# Patient Record
Sex: FEMALE | Race: BLACK OR AFRICAN AMERICAN | NOT HISPANIC OR LATINO | URBAN - METROPOLITAN AREA
[De-identification: names, ages, dates, MRNs, and addresses within clinical notes are randomized per-mention and may not be internally consistent; named-entity substitution may affect disease eponyms.]

---

## 2021-10-06 ENCOUNTER — INPATIENT (INPATIENT)
Facility: HOSPITAL | Age: 66
LOS: 5 days | Discharge: HOME | DRG: 287 | End: 2021-10-12
Attending: INTERNAL MEDICINE | Admitting: INTERNAL MEDICINE
Payer: MEDICARE

## 2021-10-06 VITALS
WEIGHT: 149.91 LBS | SYSTOLIC BLOOD PRESSURE: 127 MMHG | OXYGEN SATURATION: 97 % | HEIGHT: 64 IN | TEMPERATURE: 99 F | HEART RATE: 62 BPM | DIASTOLIC BLOOD PRESSURE: 68 MMHG | RESPIRATION RATE: 18 BRPM

## 2021-10-06 DIAGNOSIS — E11.9 TYPE 2 DIABETES MELLITUS WITHOUT COMPLICATIONS: ICD-10-CM

## 2021-10-06 DIAGNOSIS — I10 ESSENTIAL (PRIMARY) HYPERTENSION: ICD-10-CM

## 2021-10-06 DIAGNOSIS — I50.9 HEART FAILURE, UNSPECIFIED: ICD-10-CM

## 2021-10-06 DIAGNOSIS — E78.5 HYPERLIPIDEMIA, UNSPECIFIED: ICD-10-CM

## 2021-10-06 DIAGNOSIS — R07.9 CHEST PAIN, UNSPECIFIED: ICD-10-CM

## 2021-10-06 LAB
ALBUMIN SERPL ELPH-MCNC: 4.1 G/DL — SIGNIFICANT CHANGE UP (ref 3.3–5)
ALP SERPL-CCNC: 112 U/L — SIGNIFICANT CHANGE UP (ref 40–120)
ALT FLD-CCNC: 167 U/L — HIGH (ref 10–45)
ANION GAP SERPL CALC-SCNC: 10 MMOL/L — SIGNIFICANT CHANGE UP (ref 5–17)
APTT BLD: 31.2 SEC — SIGNIFICANT CHANGE UP (ref 27.5–35.5)
AST SERPL-CCNC: 141 U/L — HIGH (ref 10–40)
BASOPHILS # BLD AUTO: 0.03 K/UL — SIGNIFICANT CHANGE UP (ref 0–0.2)
BASOPHILS NFR BLD AUTO: 0.5 % — SIGNIFICANT CHANGE UP (ref 0–2)
BILIRUB SERPL-MCNC: 0.5 MG/DL — SIGNIFICANT CHANGE UP (ref 0.2–1.2)
BUN SERPL-MCNC: 19 MG/DL — SIGNIFICANT CHANGE UP (ref 7–23)
CALCIUM SERPL-MCNC: 10.8 MG/DL — HIGH (ref 8.4–10.5)
CHLORIDE SERPL-SCNC: 105 MMOL/L — SIGNIFICANT CHANGE UP (ref 96–108)
CK MB CFR SERPL CALC: 1.3 NG/ML — SIGNIFICANT CHANGE UP (ref 0–6.7)
CK MB CFR SERPL CALC: 1.5 NG/ML — SIGNIFICANT CHANGE UP (ref 0–6.7)
CK SERPL-CCNC: 106 U/L — SIGNIFICANT CHANGE UP (ref 25–170)
CK SERPL-CCNC: 99 U/L — SIGNIFICANT CHANGE UP (ref 25–170)
CO2 SERPL-SCNC: 26 MMOL/L — SIGNIFICANT CHANGE UP (ref 22–31)
CREAT SERPL-MCNC: 1.11 MG/DL — SIGNIFICANT CHANGE UP (ref 0.5–1.3)
EOSINOPHIL # BLD AUTO: 0.06 K/UL — SIGNIFICANT CHANGE UP (ref 0–0.5)
EOSINOPHIL NFR BLD AUTO: 1.1 % — SIGNIFICANT CHANGE UP (ref 0–6)
GLUCOSE SERPL-MCNC: 95 MG/DL — SIGNIFICANT CHANGE UP (ref 70–99)
HCT VFR BLD CALC: 36.6 % — SIGNIFICANT CHANGE UP (ref 34.5–45)
HGB BLD-MCNC: 11.2 G/DL — LOW (ref 11.5–15.5)
IMM GRANULOCYTES NFR BLD AUTO: 0.4 % — SIGNIFICANT CHANGE UP (ref 0–1.5)
INR BLD: 1.06 — SIGNIFICANT CHANGE UP (ref 0.88–1.16)
LYMPHOCYTES # BLD AUTO: 1.28 K/UL — SIGNIFICANT CHANGE UP (ref 1–3.3)
LYMPHOCYTES # BLD AUTO: 22.7 % — SIGNIFICANT CHANGE UP (ref 13–44)
MCHC RBC-ENTMCNC: 25.8 PG — LOW (ref 27–34)
MCHC RBC-ENTMCNC: 30.6 GM/DL — LOW (ref 32–36)
MCV RBC AUTO: 84.3 FL — SIGNIFICANT CHANGE UP (ref 80–100)
MONOCYTES # BLD AUTO: 0.28 K/UL — SIGNIFICANT CHANGE UP (ref 0–0.9)
MONOCYTES NFR BLD AUTO: 5 % — SIGNIFICANT CHANGE UP (ref 2–14)
NEUTROPHILS # BLD AUTO: 3.98 K/UL — SIGNIFICANT CHANGE UP (ref 1.8–7.4)
NEUTROPHILS NFR BLD AUTO: 70.3 % — SIGNIFICANT CHANGE UP (ref 43–77)
NRBC # BLD: 0 /100 WBCS — SIGNIFICANT CHANGE UP (ref 0–0)
NT-PROBNP SERPL-SCNC: 2156 PG/ML — HIGH (ref 0–300)
PLATELET # BLD AUTO: 255 K/UL — SIGNIFICANT CHANGE UP (ref 150–400)
POTASSIUM SERPL-MCNC: 4.5 MMOL/L — SIGNIFICANT CHANGE UP (ref 3.5–5.3)
POTASSIUM SERPL-SCNC: 4.5 MMOL/L — SIGNIFICANT CHANGE UP (ref 3.5–5.3)
PROT SERPL-MCNC: 6.8 G/DL — SIGNIFICANT CHANGE UP (ref 6–8.3)
PROTHROM AB SERPL-ACNC: 12.7 SEC — SIGNIFICANT CHANGE UP (ref 10.6–13.6)
RBC # BLD: 4.34 M/UL — SIGNIFICANT CHANGE UP (ref 3.8–5.2)
RBC # FLD: 18.1 % — HIGH (ref 10.3–14.5)
SARS-COV-2 RNA SPEC QL NAA+PROBE: NEGATIVE — SIGNIFICANT CHANGE UP
SODIUM SERPL-SCNC: 141 MMOL/L — SIGNIFICANT CHANGE UP (ref 135–145)
TROPONIN T SERPL-MCNC: <0.01 NG/ML — SIGNIFICANT CHANGE UP (ref 0–0.01)
TROPONIN T SERPL-MCNC: <0.01 NG/ML — SIGNIFICANT CHANGE UP (ref 0–0.01)
WBC # BLD: 5.65 K/UL — SIGNIFICANT CHANGE UP (ref 3.8–10.5)
WBC # FLD AUTO: 5.65 K/UL — SIGNIFICANT CHANGE UP (ref 3.8–10.5)

## 2021-10-06 PROCEDURE — 71045 X-RAY EXAM CHEST 1 VIEW: CPT | Mod: 26

## 2021-10-06 PROCEDURE — 99284 EMERGENCY DEPT VISIT MOD MDM: CPT | Mod: CS

## 2021-10-06 PROCEDURE — 93010 ELECTROCARDIOGRAM REPORT: CPT

## 2021-10-06 PROCEDURE — 93306 TTE W/DOPPLER COMPLETE: CPT | Mod: 26

## 2021-10-06 PROCEDURE — 99223 1ST HOSP IP/OBS HIGH 75: CPT

## 2021-10-06 RX ORDER — ESCITALOPRAM OXALATE 10 MG/1
1 TABLET, FILM COATED ORAL
Qty: 0 | Refills: 0 | DISCHARGE

## 2021-10-06 RX ORDER — FUROSEMIDE 40 MG
20 TABLET ORAL
Refills: 0 | Status: DISCONTINUED | OUTPATIENT
Start: 2021-10-06 | End: 2021-10-08

## 2021-10-06 RX ORDER — PANTOPRAZOLE SODIUM 20 MG/1
40 TABLET, DELAYED RELEASE ORAL
Refills: 0 | Status: DISCONTINUED | OUTPATIENT
Start: 2021-10-06 | End: 2021-10-12

## 2021-10-06 RX ORDER — FLUTICASONE PROPIONATE AND SALMETEROL 50; 250 UG/1; UG/1
2 POWDER ORAL; RESPIRATORY (INHALATION)
Qty: 0 | Refills: 0 | DISCHARGE

## 2021-10-06 RX ORDER — ESCITALOPRAM OXALATE 10 MG/1
10 TABLET, FILM COATED ORAL DAILY
Refills: 0 | Status: DISCONTINUED | OUTPATIENT
Start: 2021-10-06 | End: 2021-10-12

## 2021-10-06 RX ORDER — CLOPIDOGREL BISULFATE 75 MG/1
600 TABLET, FILM COATED ORAL ONCE
Refills: 0 | Status: COMPLETED | OUTPATIENT
Start: 2021-10-07 | End: 2021-10-07

## 2021-10-06 RX ORDER — ASPIRIN/CALCIUM CARB/MAGNESIUM 324 MG
81 TABLET ORAL DAILY
Refills: 0 | Status: DISCONTINUED | OUTPATIENT
Start: 2021-10-07 | End: 2021-10-12

## 2021-10-06 RX ORDER — MONTELUKAST 4 MG/1
1 TABLET, CHEWABLE ORAL
Qty: 0 | Refills: 0 | DISCHARGE

## 2021-10-06 RX ORDER — FLUTICASONE PROPIONATE 220 MCG
1 AEROSOL WITH ADAPTER (GRAM) INHALATION
Qty: 0 | Refills: 0 | DISCHARGE

## 2021-10-06 RX ORDER — NIFEDIPINE 30 MG
60 TABLET, EXTENDED RELEASE 24 HR ORAL DAILY
Refills: 0 | Status: DISCONTINUED | OUTPATIENT
Start: 2021-10-06 | End: 2021-10-12

## 2021-10-06 RX ORDER — ATENOLOL 25 MG/1
50 TABLET ORAL DAILY
Refills: 0 | Status: DISCONTINUED | OUTPATIENT
Start: 2021-10-06 | End: 2021-10-08

## 2021-10-06 RX ORDER — ASPIRIN/CALCIUM CARB/MAGNESIUM 324 MG
325 TABLET ORAL DAILY
Refills: 0 | Status: DISCONTINUED | OUTPATIENT
Start: 2021-10-06 | End: 2021-10-06

## 2021-10-06 RX ORDER — ASCORBIC ACID 60 MG
1 TABLET,CHEWABLE ORAL
Qty: 0 | Refills: 0 | DISCHARGE

## 2021-10-06 RX ORDER — MONTELUKAST 4 MG/1
10 TABLET, CHEWABLE ORAL DAILY
Refills: 0 | Status: DISCONTINUED | OUTPATIENT
Start: 2021-10-06 | End: 2021-10-12

## 2021-10-06 RX ORDER — ALBUTEROL 90 UG/1
0 AEROSOL, METERED ORAL
Qty: 0 | Refills: 0 | DISCHARGE

## 2021-10-06 RX ORDER — NITROGLYCERIN 6.5 MG
0.4 CAPSULE, EXTENDED RELEASE ORAL ONCE
Refills: 0 | Status: COMPLETED | OUTPATIENT
Start: 2021-10-06 | End: 2021-10-06

## 2021-10-06 RX ORDER — OXYBUTYNIN CHLORIDE 5 MG
1 TABLET ORAL
Qty: 0 | Refills: 0 | DISCHARGE

## 2021-10-06 RX ORDER — ACETAMINOPHEN 500 MG
650 TABLET ORAL ONCE
Refills: 0 | Status: COMPLETED | OUTPATIENT
Start: 2021-10-06 | End: 2021-10-06

## 2021-10-06 RX ORDER — OXYBUTYNIN CHLORIDE 5 MG
10 TABLET ORAL DAILY
Refills: 0 | Status: DISCONTINUED | OUTPATIENT
Start: 2021-10-06 | End: 2021-10-06

## 2021-10-06 RX ORDER — METFORMIN HYDROCHLORIDE 850 MG/1
1 TABLET ORAL
Qty: 0 | Refills: 0 | DISCHARGE

## 2021-10-06 RX ORDER — ALBUTEROL 90 UG/1
2 AEROSOL, METERED ORAL
Qty: 0 | Refills: 0 | DISCHARGE

## 2021-10-06 RX ORDER — ATENOLOL 25 MG/1
1 TABLET ORAL
Qty: 0 | Refills: 0 | DISCHARGE

## 2021-10-06 RX ORDER — OXYBUTYNIN CHLORIDE 5 MG
10 TABLET ORAL DAILY
Refills: 0 | Status: DISCONTINUED | OUTPATIENT
Start: 2021-10-06 | End: 2021-10-07

## 2021-10-06 RX ORDER — NIFEDIPINE 30 MG
1 TABLET, EXTENDED RELEASE 24 HR ORAL
Qty: 0 | Refills: 0 | DISCHARGE

## 2021-10-06 RX ORDER — CETIRIZINE HYDROCHLORIDE 10 MG/1
1 TABLET ORAL
Qty: 0 | Refills: 0 | DISCHARGE

## 2021-10-06 RX ORDER — PANTOPRAZOLE SODIUM 20 MG/1
1 TABLET, DELAYED RELEASE ORAL
Qty: 0 | Refills: 0 | DISCHARGE

## 2021-10-06 RX ADMIN — Medication 20 MILLIGRAM(S): at 17:27

## 2021-10-06 RX ADMIN — MONTELUKAST 10 MILLIGRAM(S): 4 TABLET, CHEWABLE ORAL at 17:27

## 2021-10-06 RX ADMIN — PANTOPRAZOLE SODIUM 40 MILLIGRAM(S): 20 TABLET, DELAYED RELEASE ORAL at 18:33

## 2021-10-06 RX ADMIN — Medication 650 MILLIGRAM(S): at 21:59

## 2021-10-06 RX ADMIN — Medication 650 MILLIGRAM(S): at 23:07

## 2021-10-06 RX ADMIN — Medication 0.1 MILLIGRAM(S): at 17:28

## 2021-10-06 RX ADMIN — Medication 325 MILLIGRAM(S): at 18:32

## 2021-10-06 RX ADMIN — Medication 0.4 MILLIGRAM(S): at 12:24

## 2021-10-06 NOTE — ED PROVIDER NOTE - CLINICAL SUMMARY MEDICAL DECISION MAKING FREE TEXT BOX
This is a 65 y/o female with HTN, HLD, diabetes, and asthma p/w with one week of worsening 9/10 chest pain radiating to both shoulders associated with worsening GOYAL. EKG upon ED arrival showed diffuse T-wave inversions. Nitroglycerin did not relieve the pain. Tylenol administered for pain relief. CBC wnl, CMP s/f elevated transaminases, possible congestive hepatopathy. Elevated pro-BNP with negative troponin and d-dimer. Repeat EKG was performed due to continued pain, demonstrating This is a 65 y/o female with HTN, HLD, diabetes, and asthma p/w with one week of worsening 9/10 chest pain radiating to both shoulders associated with worsening GOYAL. EKG upon ED arrival showed diffuse T-wave inversions. Nitroglycerin did not relieve the pain. Tylenol administered for pain relief. CBC wnl, CMP s/f elevated transaminases, possible congestive hepatopathy. Elevated pro-BNP with negative troponin and d-dimer. Repeat EKG was performed due to continued pain, unchanged from prior. Admit to cardiology for ACS workup and possible new CHF diagnosis.

## 2021-10-06 NOTE — CHART NOTE - NSCHARTNOTEFT_GEN_A_CORE
CARE PLAN UPDATE:    TTE (10/6/21): normal LV size/fxn, dilated RV, mildly reduced RV systolic fxn, dilated RA, mild TR, pulmHTN (PASP 101).     In light of patient's symptoms, mildly reduced RV systolic fxn on TTE, and TWI in inferior leads on EKG, patient recommended to Left Heart Catheterization with possible intervention. In light of pulmHTN w/ PASP 101 on TTE, patient recommended for a Right Heart Catheterization.     ****OF NOTE: patient has developmental disability and is a permanent resident at [facility], a residence for people with developmental delays located in [place]. Care plan was discussed with Kamille Marvin (), Jeny Nelson (Director of the Clinical Nursing Services), and Veena Mendez (patient's sister). Consent needs to be approved by "Representative Committee" per Ms. Nelson.

## 2021-10-06 NOTE — ED ADULT NURSE NOTE - OBJECTIVE STATEMENT
Presents for increasing SOB on exertion x 1 week with now some chest pain;  presents from facility with PCT.

## 2021-10-06 NOTE — H&P ADULT - NSHPLABSRESULTS_GEN_ALL_CORE
11.2   5.65  )-----------( 255      ( 06 Oct 2021 11:47 )             36.6     141  |  105  |  19  ----------------------------<  95  4.5   |  26  |  1.11    Ca    10.8<H>      06 Oct 2021 11:47    TPro  6.8  /  Alb  4.1  /  TBili  0.5  /  DBili  x   /  AST  141<H>  /  ALT  167<H>  /  AlkPhos  112  10-06      PT/INR - ( 06 Oct 2021 11:47 )   PT: 12.7 sec;   INR: 1.06          PTT - ( 06 Oct 2021 11:47 )  PTT:31.2 sec    CARDIAC MARKERS ( 06 Oct 2021 11:47 )  x     / <0.01 ng/mL / 106 U/L / x     / 1.5 ng/mL            EKG:

## 2021-10-06 NOTE — H&P ADULT - PROBLEM SELECTOR PLAN 4
--Pt on Pravastatin 10mg PO QD at home - not on formulary.   --F/U Lipid profile and consider initiating inpatient Simvastatin or Atorvastatin.

## 2021-10-06 NOTE — H&P ADULT - ATTENDING COMMENTS
Initial attending contact date 10/6/21 on admission. 66F with developmental delay (sister provides consent) with HTn, HLD, DM and asthma who p/w GOYAL and CP.  TTE revealed nml LV function but RV dysfunction and RV dilation with PAP 101mmHg  Plan for:  NPO for R/LHC today 10/7  DAPT load per IC team  BP control with Atenolol 50 daily, Clonindine 0.1 BID, Nifed 60 daily  Lasix 20IV BID to alleviate dyspnea, will redose per RHC results  Trend LFTs daily to assess for resolution of transaminitis  Nutrition consults for lifestyle modification counselling  pHTN service consulted, Dr Barnett will be co- for RHC  PT evaluation requested  Details discussed with the patient and cardiac care team  Sugar Mata M.D.  Cardiology Attending  75minutes spent on total encounter; more than 50% of the visit was spent counseling and/or coordinating care by the attending physician, with plan of care discussed with the patient and cardiac team. Initial attending contact date 10/6/21 on admission. 66F with developmental delay (sister provides consent) with HTn, HLD, DM and asthma who p/w GOYAL and CP.  TTE revealed nml LV function but RV dysfunction and RV dilation with PAP 101mmHg  Plan for:  NPOpMN for R/LHC 10/7  DAPT load per IC team  BP control with Atenolol 50 daily, Clonindine 0.1 BID, Nifed 60 daily  Lasix 20IV BID to alleviate dyspnea, will redose per RHC results  Trend LFTs daily to assess for resolution of transaminitis  Nutrition consults for lifestyle modification counselling  pHTN service consulted, Dr Barnett will be co- for RHC  PT evaluation requested  Details discussed with the patient and cardiac care team  Sugar Mata M.D.  Cardiology Attending  75minutes spent on total encounter; more than 50% of the visit was spent counseling and/or coordinating care by the attending physician, with plan of care discussed with the patient and cardiac team.

## 2021-10-06 NOTE — ED PROVIDER NOTE - OBJECTIVE STATEMENT
This is a 67 y/o female with a history of HLD, HTN, diabetes, and asthma p/w a week of SOB and chest pain. One week ago patient noticed that she has to catch her breath after walking 3-4 blocks or up/down the stairs. This is associated with 8/10 chest pain that radiates to both shoulders. Yesterday her SOB worsened to being unable to walk one block. Last night she had an episode of 9/10 chest pain and this morning she woke up and felt lightheaded. Endorses nausea and one episode of vomiting. Denies fever, chills.

## 2021-10-06 NOTE — ED PROVIDER NOTE - ATTENDING CONTRIBUTION TO CARE
Attending Statement: I have personally performed a face to face diagnostic evaluation on this patient. I have reviewed the MS4 note and agree with the history, exam and plan of care, except as noted.     Attending Contribution to Care:  66F with hx of developmental delay HTN, HLD, diabetes, and asthma p/w with one week of worsening 9/10 chest pain radiating to both shoulders associated with worsening GOYAL. EKG upon ED arrival showed diffuse T-wave inversions, no stemi. labs including CE, dd and cxr performed. Nitro given without releif, will try Tylenol. Labs remarkable for elevated pro-BNP with negative troponin and d-dimer. Repeat EKG was performed due to continued pain, unchanged from prior. Pt Admitted to cardiology for ACS workup and possible new onset CHF.

## 2021-10-06 NOTE — H&P ADULT - PROBLEM SELECTOR PLAN 2
--CCS Class IV anginal symptome w/ GOYAL and chest pain w/ occasional symptoms at rest.    --EKG w/ diffuse TWI in II/III/aVF/V2-V6 (baseline EKG unknown).  --Pending LVEF evaluation with TTE, will proceed with CCTA vs. Cardiac Cath for evaluation of coronary arteries.

## 2021-10-06 NOTE — H&P ADULT - HISTORY OF PRESENT ILLNESS
***INCOMPLETE / IN PROGRESS NOTE***       Pt is 67yo Female who has a developmental delay (lives at [facility] in [place]) and has a PMHx of HTN, HLD, DM T2, and Asthma who presented to [facility] on 10/6/21 endrosing one week of progressively worsening GOYAL w/ ambulation of 1 block. Pt also endorsing associated chest pain that radiates to both shoulders. She decided to present to the ED because overnight she started to have chest pain at rest that was 9/10. Pt also endorses LE edema over the past few months, and an episodes of nausea w/ 1 episode of vomiting. Pt denies abdominal pain, dizziness/syncope, palpitations, orthopnea, fever/chills.      VITALS: HR 58-70, -169/70-99, O2 94%, T 97.7F. Labs: WBC 5.65, Hgb/Hct 11.2/36.6, Plt Cnt 255; Na 141, K 4.5; BUN/Cr 19/1.11; D-Dimer < 150; Trop T < 0.01, CK/CKMB (-); BNP 2156.      Patient admitted to cardiology for further work up of suspected new onset CHF as well as r/o CAD.

## 2021-10-06 NOTE — H&P ADULT - PROBLEM SELECTOR PLAN 1
--Unspecified; pt w/ SOB and LE edema; BNP elevated; Pt denies Hx.    --TTE ordered – f/u results.    --CONT: Lasix 20 IV BID --- pt is Lasix naive; monitor response to low dose IV Lasix.   --Strict I/O’s and daily weights; Core measures.    --PLAN: admtie for diuresis and work up; TTE ordered – f/u; If LVEF reduced, tentative plan for Right and Left Heart Catheterization; If LVEF is not reduced, tentative plan for CCTA for r/o CAD.

## 2021-10-06 NOTE — H&P ADULT - PROBLEM SELECTOR PLAN 3
--CONT: Clonidine 0.1mg PO BID, Atenolol 50mg PO QD, and Nifedipine 60mg PO QD.   --HOLD: HCTZ 25mg PO QD (as pt is receiving Lasix).

## 2021-10-06 NOTE — H&P ADULT - RS GEN PE MLT RESP DETAILS PC
normal/airway patent/breath sounds equal/respirations non-labored/clear to auscultation bilaterally/no rales/no rhonchi/no wheezes

## 2021-10-06 NOTE — H&P ADULT - ASSESSMENT
65yo F w/ developmental delay and PMHx of HTN, HLD, DM T2, and Asthma. Presented w/ GOYAL, CP, and LE edema. BNP elevated. Cardiac enzymes not elevated. EKG w/ TWI II/III/aVF, V2-V6 (no prior EKG for comparison). Pt started on low dose IV Lasix. Pending TTE, will proceed with evaluation for CAD (R/LHC if LVEF reduced; CCTA if LVEF not reduced).

## 2021-10-07 LAB
A1C WITH ESTIMATED AVERAGE GLUCOSE RESULT: 6.3 % — HIGH (ref 4–5.6)
ALBUMIN SERPL ELPH-MCNC: 3.6 G/DL — SIGNIFICANT CHANGE UP (ref 3.3–5)
ALP SERPL-CCNC: 82 U/L — SIGNIFICANT CHANGE UP (ref 40–120)
ALT FLD-CCNC: 105 U/L — HIGH (ref 10–45)
ANION GAP SERPL CALC-SCNC: 11 MMOL/L — SIGNIFICANT CHANGE UP (ref 5–17)
AST SERPL-CCNC: 51 U/L — HIGH (ref 10–40)
BILIRUB DIRECT SERPL-MCNC: 0.2 MG/DL — SIGNIFICANT CHANGE UP (ref 0–0.2)
BILIRUB INDIRECT FLD-MCNC: 0.4 MG/DL — SIGNIFICANT CHANGE UP (ref 0.2–1)
BILIRUB SERPL-MCNC: 0.6 MG/DL — SIGNIFICANT CHANGE UP (ref 0.2–1.2)
BUN SERPL-MCNC: 19 MG/DL — SIGNIFICANT CHANGE UP (ref 7–23)
CALCIUM SERPL-MCNC: 10.4 MG/DL — SIGNIFICANT CHANGE UP (ref 8.4–10.5)
CHLORIDE SERPL-SCNC: 102 MMOL/L — SIGNIFICANT CHANGE UP (ref 96–108)
CHOLEST SERPL-MCNC: 144 MG/DL — SIGNIFICANT CHANGE UP
CO2 SERPL-SCNC: 26 MMOL/L — SIGNIFICANT CHANGE UP (ref 22–31)
COVID-19 SPIKE DOMAIN AB INTERP: POSITIVE
COVID-19 SPIKE DOMAIN ANTIBODY RESULT: >250 U/ML — HIGH
CREAT SERPL-MCNC: 1.07 MG/DL — SIGNIFICANT CHANGE UP (ref 0.5–1.3)
ESTIMATED AVERAGE GLUCOSE: 134 MG/DL — HIGH (ref 68–114)
GLUCOSE SERPL-MCNC: 122 MG/DL — HIGH (ref 70–99)
HCT VFR BLD CALC: 34.9 % — SIGNIFICANT CHANGE UP (ref 34.5–45)
HCV AB S/CO SERPL IA: 0.04 S/CO — SIGNIFICANT CHANGE UP
HCV AB SERPL-IMP: SIGNIFICANT CHANGE UP
HDLC SERPL-MCNC: 55 MG/DL — SIGNIFICANT CHANGE UP
HGB BLD-MCNC: 10.9 G/DL — LOW (ref 11.5–15.5)
LIPID PNL WITH DIRECT LDL SERPL: 61 MG/DL — SIGNIFICANT CHANGE UP
MAGNESIUM SERPL-MCNC: 1.4 MG/DL — LOW (ref 1.6–2.6)
MCHC RBC-ENTMCNC: 25.9 PG — LOW (ref 27–34)
MCHC RBC-ENTMCNC: 31.2 GM/DL — LOW (ref 32–36)
MCV RBC AUTO: 82.9 FL — SIGNIFICANT CHANGE UP (ref 80–100)
NON HDL CHOLESTEROL: 89 MG/DL — SIGNIFICANT CHANGE UP
NRBC # BLD: 0 /100 WBCS — SIGNIFICANT CHANGE UP (ref 0–0)
PLATELET # BLD AUTO: 239 K/UL — SIGNIFICANT CHANGE UP (ref 150–400)
POTASSIUM SERPL-MCNC: 3.8 MMOL/L — SIGNIFICANT CHANGE UP (ref 3.5–5.3)
POTASSIUM SERPL-SCNC: 3.8 MMOL/L — SIGNIFICANT CHANGE UP (ref 3.5–5.3)
PROT SERPL-MCNC: 6.2 G/DL — SIGNIFICANT CHANGE UP (ref 6–8.3)
RBC # BLD: 4.21 M/UL — SIGNIFICANT CHANGE UP (ref 3.8–5.2)
RBC # FLD: 17.5 % — HIGH (ref 10.3–14.5)
SARS-COV-2 IGG+IGM SERPL QL IA: >250 U/ML — HIGH
SARS-COV-2 IGG+IGM SERPL QL IA: POSITIVE
SODIUM SERPL-SCNC: 139 MMOL/L — SIGNIFICANT CHANGE UP (ref 135–145)
TRIGL SERPL-MCNC: 139 MG/DL — SIGNIFICANT CHANGE UP
WBC # BLD: 4.61 K/UL — SIGNIFICANT CHANGE UP (ref 3.8–10.5)
WBC # FLD AUTO: 4.61 K/UL — SIGNIFICANT CHANGE UP (ref 3.8–10.5)

## 2021-10-07 PROCEDURE — 99221 1ST HOSP IP/OBS SF/LOW 40: CPT | Mod: GC

## 2021-10-07 PROCEDURE — 99233 SBSQ HOSP IP/OBS HIGH 50: CPT

## 2021-10-07 PROCEDURE — 99152 MOD SED SAME PHYS/QHP 5/>YRS: CPT | Mod: GC

## 2021-10-07 PROCEDURE — 99152 MOD SED SAME PHYS/QHP 5/>YRS: CPT

## 2021-10-07 PROCEDURE — 93460 R&L HRT ART/VENTRICLE ANGIO: CPT | Mod: 26

## 2021-10-07 PROCEDURE — 93463 DRUG ADMIN & HEMODYNMIC MEAS: CPT

## 2021-10-07 PROCEDURE — 93451 RIGHT HEART CATH: CPT | Mod: 26,GC

## 2021-10-07 RX ORDER — POTASSIUM CHLORIDE 20 MEQ
20 PACKET (EA) ORAL ONCE
Refills: 0 | Status: COMPLETED | OUTPATIENT
Start: 2021-10-07 | End: 2021-10-07

## 2021-10-07 RX ORDER — MAGNESIUM SULFATE 500 MG/ML
2 VIAL (ML) INJECTION ONCE
Refills: 0 | Status: COMPLETED | OUTPATIENT
Start: 2021-10-07 | End: 2021-10-07

## 2021-10-07 RX ORDER — OXYBUTYNIN CHLORIDE 5 MG
5 TABLET ORAL
Refills: 0 | Status: DISCONTINUED | OUTPATIENT
Start: 2021-10-07 | End: 2021-10-12

## 2021-10-07 RX ORDER — ACETAMINOPHEN 500 MG
650 TABLET ORAL ONCE
Refills: 0 | Status: COMPLETED | OUTPATIENT
Start: 2021-10-07 | End: 2021-10-07

## 2021-10-07 RX ADMIN — Medication 81 MILLIGRAM(S): at 08:56

## 2021-10-07 RX ADMIN — Medication 20 MILLIGRAM(S): at 06:17

## 2021-10-07 RX ADMIN — Medication 650 MILLIGRAM(S): at 07:53

## 2021-10-07 RX ADMIN — ATENOLOL 50 MILLIGRAM(S): 25 TABLET ORAL at 06:17

## 2021-10-07 RX ADMIN — CLOPIDOGREL BISULFATE 600 MILLIGRAM(S): 75 TABLET, FILM COATED ORAL at 06:17

## 2021-10-07 RX ADMIN — Medication 50 GRAM(S): at 08:56

## 2021-10-07 RX ADMIN — MONTELUKAST 10 MILLIGRAM(S): 4 TABLET, CHEWABLE ORAL at 22:29

## 2021-10-07 RX ADMIN — Medication 20 MILLIEQUIVALENT(S): at 08:56

## 2021-10-07 RX ADMIN — Medication 60 MILLIGRAM(S): at 07:13

## 2021-10-07 RX ADMIN — ESCITALOPRAM OXALATE 10 MILLIGRAM(S): 10 TABLET, FILM COATED ORAL at 22:30

## 2021-10-07 RX ADMIN — Medication 0.1 MILLIGRAM(S): at 06:17

## 2021-10-07 RX ADMIN — Medication 0.1 MILLIGRAM(S): at 22:29

## 2021-10-07 RX ADMIN — Medication 5 MILLIGRAM(S): at 22:29

## 2021-10-07 RX ADMIN — Medication 20 MILLIGRAM(S): at 22:29

## 2021-10-07 RX ADMIN — Medication 650 MILLIGRAM(S): at 06:16

## 2021-10-07 RX ADMIN — PANTOPRAZOLE SODIUM 40 MILLIGRAM(S): 20 TABLET, DELAYED RELEASE ORAL at 06:17

## 2021-10-07 NOTE — PROGRESS NOTE ADULT - PROBLEM SELECTOR PLAN 5
--F/U A1c.   --Not on insulin at home; holding home Metformin 500mg PO BID while inpatient.       DVT ppx: Hep SQ  GI ppx: Pantoprazole 40mg PO QD.   Dispo: CAD work up - plan pending TTE results.  SW: consulted; pt lives at [facility] in [place]; will need discharge back to facility when ready Hgb a1c 6.3  - Not on insulin at home; holding home Metformin 500mg PO BID while inpatient.     DVT ppx: holding w/ plan for Cath  GI ppx: Pantoprazole 40mg PO QD.   Dispo: pending Cath results.   SW: consulted; pt lives at [facility] in [place]; will need discharge back to facility when ready

## 2021-10-07 NOTE — PROGRESS NOTE ADULT - PROBLEM SELECTOR PLAN 4
--Pt on Pravastatin 10mg PO QD at home - not on formulary.   --F/U Lipid profile and consider initiating inpatient Simvastatin or Atorvastatin. - Pt on Pravastatin 10mg PO QD at home - not on formulary.   - LDL 61  - given elevated LFTs holing Statin for now, f/u LFTs in AM and consider resuming statin

## 2021-10-07 NOTE — PROGRESS NOTE ADULT - PROBLEM SELECTOR PLAN 1
--Unspecified; pt w/ SOB and LE edema; BNP elevated; Pt denies Hx.    --TTE ordered – f/u results.    --CONT: Lasix 20 IV BID --- pt is Lasix naive; monitor response to low dose IV Lasix.   --Strict I/O’s and daily weights; Core measures.    --PLAN: admtie for diuresis and work up; TTE ordered – f/u; If LVEF reduced, tentative plan for Right and Left Heart Catheterization; If LVEF is not reduced, tentative plan for CCTA for r/o CAD. SOB improved, 1+ edema. BNP 2156  - Echo 10/5: normal LV size/fxn, dilated RV, mildly reduced RV systolic fxn, dilated RA, mild TR, pulmHTN (PASP 101). (of note echo still states performed and no official results in sunrise)  - NPO for R/LHC w/ thermodilution.  Consent obtained by sister and in chart, Loaded with Plavix 600mg PO x1 this AM, Continue ASA 81mg QD.  no fluids given diuresis.   - CONT: Lasix 20 IV BID --- pt is Lasix naive; monitor response to low dose IV Lasix.   - elevated LFTs likely 2/2 hepatic congestion, trending down, f/u repeat BMP tomorrow.   - Strict I/O’s and daily weights; Core measures.

## 2021-10-07 NOTE — PROGRESS NOTE ADULT - ASSESSMENT
66 F with developmental delay, HTN, HLD, DM2, asthma, and ?hx of MI who presents with progressive worsening GOYAL associated with chest discomfort x1-2 weeks, found to have elevated PASP on ECHO. Admitted to cardiac telemetry. Pulmonary consulted for pulmonary hypertension workup.    #Pulmonary hypertension  Review of ECHO (official read still pending) with evidence of pulmonary hypertension. Patient pending RHC today to     Suspected based on unofficial TTE with estimated PASP 101 torr (see chart note); formal TTE pending. No reported hx of PH or cardiomyopathies. No known rheumatologic, connective tissue, or structural lung/heart disease history that would clue into group I, III or V etiologies of PH though further workup will likely be needed and no prior CT chest imaging available. RG, if present, would be highly unlikely to cause such severe PH on its own. CTEPH may need to be ruled out eventually, pending initial work-up.     Her current presentation seems like new-onset heart failure with anterior B-line predominant pattern (though not confluent in upper fields) and trivial bilateral effusions contextualized to given hx of progressive GOYAL, orthopnea, LE edema and mildly elevated serum proBNP. Symptoms have improved after initial dose of lasix in the ED.    Recommendations:  - agree w/ trial of diuresis  - potential L+RHC tomorrow per cardiology, rule out ischemic heart dz  - f/u formal TTE  - if right heart cath is performed and patient is in heart failure, may obfuscate underlying source(s) of PH  - bronchodilators as needed; does not appear to be in acute exacerbation of asthma  - additional recommendations likely to follow after initial work-up     To be discussed with attending in AM 66 F with developmental delay, HTN, HLD, DM2, asthma, and ?hx of MI who presents with progressive worsening GOYAL associated with chest discomfort x1-2 weeks, found to have elevated PASP on ECHO. Admitted to cardiac telemetry. Pulmonary consulted for pulmonary hypertension workup.    #Pulmonary hypertension  Review of ECHO (official read still pending) with evidence of pulmonary hypertension. Patient pending RHC today to further evaluate right-sided pressures and confirm PH. Patient with likely multiple etiologies of PH as she has multiple risk factors.   -Pending formal read of ECHO to determine whether pt has heart failure, p/w progressive GOYAL and chest discomfort suggestive of angina. Family reports hx of MI ~30 years ago, pending LHC to evaluate for coronary artery disease.  -Pt likely with component of OHS/RG. Pt obese, BMI ~38 (standing weight 100kg). Pt denies hx of sleep apnea however sister reports that she snores. STOP-BANG >4, indicating high risk for RG.  -Pt obese and mostly sedentary, with risk factors for hypercoagulable state and CTEPH  -Pt without any personal or family history of rheumatologic or connective tissue disorders. Social history negative for alcohol use, tobacco use, cocaine or other illicit drugs, diet pills.   Recommendations:    -f/u official ECHO report    -f/u LHC to rule out coronary artery disease    -will perform RHC to evaluate for pulmonary hypertension. If present, pt will need further workup including 6-minute walk test, PFTs, CT chest without contrast, V/Q scan, sleep study, abdominal ultrasound.    -would recommend obtaining Utox and HIV to evaluate for other causes of PH.    Further recommendations pending results of RHC.  Will continue to follow.

## 2021-10-07 NOTE — PROGRESS NOTE ADULT - ATTENDING COMMENTS
66F with developmental delay (sister provides consent) with HTn, HLD, DM and asthma who p/w GOYAL and CP.  TTE revealed nml LV function but RV dysfunction and RV dilation with PAP 101mmHg.   Currently asx, HDS, exam benign, clear lungs no MARQUEZ  Plan for:  NPO for R/LHC today 10/7  DAPT load per IC team  BP control with Atenolol 50 daily, Clonidine 0.1 BID, Nifed 60 daily  Trend LFTs daily to assess for resolution of transaminitis  pHTN service consulted, Dr Barnett will be co- for RHC  PT evaluation remains pending  Details discussed with the patient, sister at bedside and cardiac care team  Sugar Mata M.D.  Cardiology Attending  35minutes spent on total encounter; more than 50% of the visit was spent counseling and/or coordinating care by the attending physician, with plan of care discussed with the patient and cardiac team.

## 2021-10-07 NOTE — PROGRESS NOTE ADULT - ASSESSMENT
65yo F w/ developmental delay and PMHx of HTN, HLD, DM T2, and Asthma. Presented w/ GOYAL, CP, and LE edema. BNP elevated. Cardiac enzymes not elevated. EKG w/ TWI II/III/aVF, V2-V6 (no prior EKG for comparison). Pt started on low dose IV Lasix. Pending TTE, will proceed with evaluation for CAD (R/LHC if LVEF reduced; CCTA if LVEF not reduced).  67yo F w/ developmental delay and PMHx of HTN, HLD, DM T2, and Asthma. Presented w/ GOYAL, CP, and LE edema. BNP elevated. Cardiac enzymes not elevated. EKG w/ TWI II/III/aVF, V2-V6 (no prior EKG for comparison). Pt started on low dose IV Lasix.  s/p Echo revealing normal EF, PASP 101.  Given abnormal EKG and pulmonary HTN on Echo patient is NPO for R/LHC.

## 2021-10-07 NOTE — PROGRESS NOTE ADULT - PROBLEM SELECTOR PLAN 3
--CONT: Clonidine 0.1mg PO BID, Atenolol 50mg PO QD, and Nifedipine 60mg PO QD.   --HOLD: HCTZ 25mg PO QD (as pt is receiving Lasix). - CONT: Clonidine 0.1mg PO BID, Atenolol 50mg PO QD, and Nifedipine 60mg PO QD.   - HOLD: HCTZ 25mg PO QD (as pt is receiving Lasix).

## 2021-10-07 NOTE — PROGRESS NOTE ADULT - SUBJECTIVE AND OBJECTIVE BOX
REASON FOR CONSULT: pulmonary hypertension    INTERVAL/OVERNIGHT EVENTS: As per overnight staff, there were no acute events overnight    SUBJECTIVE HPI: Patient seen and examined at bedside. Patient resting comfortably, denies any current shortness of breath, orthopnea somewhat improved. denies chest pain. ROS otherwise negative. Sister at the bedside. Regarding patient's medical history, sister states that patient had an MI in 1978 (no intervention performed). The patient (confirmed by sister) denies any personal history of cancer, autoimmune conditions, connective tissue disorders, congenital conditions. She does not know specifics regarding her developmental delay, and denies any family history of developmental delay. Denies any family history of autoimmune disorders, rheumatologic disorders, heart disease (HTN, HLD, CAD), DM. Denies any alcohol use, tobacco use, cocaine or other illicit drugs, diet pills.    MEDICATIONS  (STANDING):  aspirin enteric coated 81 milliGRAM(s) Oral daily  ATENolol  Tablet 50 milliGRAM(s) Oral daily  cloNIDine 0.1 milliGRAM(s) Oral two times a day  escitalopram 10 milliGRAM(s) Oral daily  furosemide   Injectable 20 milliGRAM(s) IV Push two times a day  montelukast 10 milliGRAM(s) Oral daily  NIFEdipine XL 60 milliGRAM(s) Oral daily  oxybutynin 5 milliGRAM(s) Oral two times a day  pantoprazole    Tablet 40 milliGRAM(s) Oral before breakfast      VITAL SIGNS:  Vital Signs Last 24 Hrs  T(C): 35.8 (07 Oct 2021 13:54), Max: 36.2 (06 Oct 2021 19:01)  T(F): 96.5 (07 Oct 2021 13:54), Max: 97.2 (07 Oct 2021 04:23)  HR: 55 (07 Oct 2021 13:06) (51 - 59)  BP: 149/71 (07 Oct 2021 13:06) (137/74 - 157/70)  BP(mean): --  RR: 18 (07 Oct 2021 13:06) (18 - 19)  SpO2: 92% (07 Oct 2021 13:06) (92% - 98%)      PHYSICAL EXAM:  General: pleasant, obese, lying comfortably in bed ~15 degree angle.   Neurological: AAOx3  HEENT: clear conjunctiva, no nasal or oropharyngeal discharge or exudates, MMM  Neck: no JVD  Cardiovascular: fixed split S2, no M/R/G, RRR  Respiratory: CTA B/L in all lung fields, no diminished breath sounds, no increased work of breathing or accessory muscle use, speaking in full sentences  Gastrointestinal: soft, obese abd, NT/ND; active BSx4 quadrants  Genitourinary: no suprapubic tenderness  Extremities: WWP; no edema  Vascular: 2+ radial, DP/PT pulses B/L      LABS:                        10.9   4.61  )-----------( 239      ( 07 Oct 2021 07:12 )             34.9     10-07    139  |  102  |  19  ----------------------------<  122<H>  3.8   |  26  |  1.07    Ca    10.4      07 Oct 2021 07:12  Mg     1.4     10-07    TPro  6.8  /  Alb  4.1  /  TBili  0.5  /  DBili  x   /  AST  141<H>  /  ALT  167<H>  /  AlkPhos  112  10-06    PT/INR - ( 06 Oct 2021 11:47 )   PT: 12.7 sec;   INR: 1.06     PTT - ( 06 Oct 2021 11:47 )  PTT:31.2 sec    CARDIAC MARKERS ( 06 Oct 2021 18:45 )  x     / <0.01 ng/mL / 99 U/L / x     / 1.3 ng/mL  CARDIAC MARKERS ( 06 Oct 2021 11:47 )  x     / <0.01 ng/mL / 106 U/L / x     / 1.5 ng/mL        RADIOLOGY & ADDITIONAL STUDIES: Reviewed.

## 2021-10-07 NOTE — PROGRESS NOTE ADULT - ATTENDING COMMENTS
Pt seen and examined in cath lab, agree with above. Relatively acute onset of symptoms, but per review with sister and patient (both poor historians), SOB may have been ongoing for "quite a while", difficult to ascertain exact timing of symptoms. However, presented with orthopnea and SOB with TWI concerning for ACS, but TTE with severely elevated PASP. No significant risk factors for WHO Group I PAH; she does have HTN and obesity w/ Mallampati IV which places her at risk for WHO Group II and III disease, no history of DVT/PE, no history of hematologic disorders, kidney disease, sarcoidosis, or malignancy.    Now s/p RHC which confirms severe precapillary PH (official report to follow tomorrow) with mPAP 51, PAWP 7, CO (by thermodilution) 3.3 --> PVR of 13.3WU. Of note, Chong CO/CI significantly different from thermodilution (CO by Chong >6L/min). In the absence of noncoaptation of TV and congenital disease, would favor thermodilution.    Suspect she has idiopathic PAH; however, requires full work up to r/o other causes of PAH including:    - Rheumatologic work up for CTD: ALEJANDRO, dsDNA, RF, antiCCP, complement, AntiRNP, scleroderma Abs  - US abdomen w/doppler given mildly elevated LFTs to assess for portal hypertension  - V/Q scan to assess for CTEPH  - Inspiratory and expiratory non con CT chest to assess lung parenchyma  - Full PFTs and 6MWT  - Utox, HIV  - Home sleep study to assess for RG    Plan  - Will likely start dual oral therapy, can start with Sildenafil 20mg TID tomorrow morning  - Trend LFTs, if stable and tolerating Sildenafil, can start Opsumit 10mg daily, will need to enroll patient in [facility] program prior to starting  - Will need close F/U with PAH clinic

## 2021-10-07 NOTE — PROGRESS NOTE ADULT - PROBLEM SELECTOR PLAN 2
--CCS Class IV anginal symptome w/ GOYAL and chest pain w/ occasional symptoms at rest.    --EKG w/ diffuse TWI in II/III/aVF/V2-V6 (baseline EKG unknown).  --Pending LVEF evaluation with TTE, will proceed with CCTA vs. Cardiac Cath for evaluation of coronary arteries. CCS Class IV anginal symptome w/ OGYAL and chest pain w/ occasional symptoms at rest.    - EKG w/ diffuse TWI in II/III/aVF/V2-V6 (baseline EKG unknown).  - CE negative x2.   - NPO for R/LHC today. Consent obtained by sister and in chart, Loaded with Plavix 600mg PO x1 this AM, Continue ASA 81mg QD.  no fluids given diuresis. rima

## 2021-10-07 NOTE — PROGRESS NOTE ADULT - NUTRITIONAL ASSESSMENT
SUBJECTIVE HPI: Patient seen and examined at bedside. Patient resting comfortably, denies any current shortness of breath, orthopnea somewhat improved. denies chest pain. ROS otherwise negative. Sister at the bedside. Regarding patient's medical history, sister states that patient had an MI in 1978 (no intervention performed). The patient (confirmed by sister) denies any personal history of cancer, autoimmune conditions, connective tissue disorders, congenital conditions. She does not know specifics regarding her developmental delay, and denies any family history of developmental delay. Denies any family history of autoimmune disorders, rheumatologic disorders, heart disease (HTN, HLD, CAD), DM. Denies any alcohol use, tobacco use, cocaine or other illicit drugs, diet pills.

## 2021-10-07 NOTE — PROGRESS NOTE ADULT - SUBJECTIVE AND OBJECTIVE BOX
Interventional Cardiology PA Adult Progress Note    Subjective Assessment:  	  MEDICATIONS:  ATENolol  Tablet 50 milliGRAM(s) Oral daily  cloNIDine 0.1 milliGRAM(s) Oral two times a day  furosemide   Injectable 20 milliGRAM(s) IV Push two times a day  NIFEdipine XL 60 milliGRAM(s) Oral daily      montelukast 10 milliGRAM(s) Oral daily    escitalopram 10 milliGRAM(s) Oral daily    pantoprazole    Tablet 40 milliGRAM(s) Oral before breakfast      aspirin enteric coated 81 milliGRAM(s) Oral daily  oxybutynin 5 milliGRAM(s) Oral two times a day      	    [PHYSICAL EXAM:  TELEMETRY:  T(C): 35.8 (10-07-21 @ 13:54), Max: 36.2 (10-06-21 @ 19:01)  HR: 55 (10-07-21 @ 13:06) (51 - 65)  BP: 149/71 (10-07-21 @ 13:06) (118/75 - 157/70)  RR: 18 (10-07-21 @ 13:06) (16 - 19)  SpO2: 92% (10-07-21 @ 13:06) (92% - 98%)  Wt(kg): --  I&O's Summary    06 Oct 2021 07:01  -  07 Oct 2021 07:00  --------------------------------------------------------  IN: 0 mL / OUT: 780 mL / NET: -780 mL    07 Oct 2021 07:01  -  07 Oct 2021 16:35  --------------------------------------------------------  IN: 50 mL / OUT: 0 mL / NET: 50 mL        [place]:  Central/PICC/Mid Line:                                         Appearance: Normal	  HEENT:   Normal oral mucosa, PERRL, EOMI	  Neck: Supple, + JVD/ - JVD; Carotid Bruit   Cardiovascular: Normal S1 S2, No JVD, No murmurs,   Respiratory: Lungs clear to auscultation/Decreased Breath Sounds/No Rales, Rhonchi, Wheezing	  Gastrointestinal:  Soft, Non-tender, + BS	  Skin: No rashes, No ecchymoses, No cyanosis  Extremities: Normal range of motion, No clubbing, cyanosis or edema  Vascular: Peripheral pulses palpable 2+ bilaterally  Neurologic: Non-focal  Psychiatry: A & O x 3, Mood & affect appropriate      	    ECG:  	  RADIOLOGY:   DIAGNOSTIC TESTING:  [ ] Echocardiogram:  [ ]  Catheterization:  [ ] Stress Test:    [ ] JOSEPH  OTHER: 	    LABS:	 	  CARDIAC MARKERS:                                  10.9   4.61  )-----------( 239      ( 07 Oct 2021 07:12 )             34.9     10-07    139  |  102  |  19  ----------------------------<  122<H>  3.8   |  26  |  1.07    Ca    10.4      07 Oct 2021 07:12  Mg     1.4     10-07    TPro  6.8  /  Alb  4.1  /  TBili  0.5  /  DBili  x   /  AST  141<H>  /  ALT  167<H>  /  AlkPhos  112  10-06    proBNP:   Lipid Profile:   HgA1c:   TSH:   PT/INR - ( 06 Oct 2021 11:47 )   PT: 12.7 sec;   INR: 1.06          PTT - ( 06 Oct 2021 11:47 )  PTT:31.2 sec    ASSESSMENT/PLAN: 	        DVT ppx:  Dispo:     Interventional Cardiology PA Adult Progress Note    Subjective Assessment: Patient seen and examined at bedside, feeling well.  continues to have some chest pain  ROS otherwise negative except per HPI and subjective  	  MEDICATIONS:  ATENolol  Tablet 50 milliGRAM(s) Oral daily  cloNIDine 0.1 milliGRAM(s) Oral two times a day  furosemide   Injectable 20 milliGRAM(s) IV Push two times a day  NIFEdipine XL 60 milliGRAM(s) Oral daily  montelukast 10 milliGRAM(s) Oral daily  escitalopram 10 milliGRAM(s) Oral daily    pantoprazole    Tablet 40 milliGRAM(s) Oral before breakfast      aspirin enteric coated 81 milliGRAM(s) Oral daily  oxybutynin 5 milliGRAM(s) Oral two times a day      	    [PHYSICAL EXAM:  TELEMETRY:  T(C): 35.8 (10-07-21 @ 13:54), Max: 36.2 (10-06-21 @ 19:01)  HR: 55 (10-07-21 @ 13:06) (51 - 65)  BP: 149/71 (10-07-21 @ 13:06) (118/75 - 157/70)  RR: 18 (10-07-21 @ 13:06) (16 - 19)  SpO2: 92% (10-07-21 @ 13:06) (92% - 98%)  Wt(kg): --  I&O's Summary    06 Oct 2021 07:01  -  07 Oct 2021 07:00  --------------------------------------------------------  IN: 0 mL / OUT: 780 mL / NET: -780 mL    07 Oct 2021 07:01  -  07 Oct 2021 16:35  --------------------------------------------------------  IN: 50 mL / OUT: 0 mL / NET: 50 mL                                   Appearance: Normal	  HEENT:   Normal oral mucosa, PERRL, EOMI	  Neck: Supple,  - JVD; Carotid Bruit   Cardiovascular: Normal S1 S2, No JVD, No murmurs,   Respiratory: decrease breath sounds  Gastrointestinal:  Soft, Non-tender, + BS	  Skin: No rashes, No ecchymoses, No cyanosis  Extremities: Normal range of motion, No clubbing, cyanosis or 1+ edema  Vascular: Peripheral pulses palpable 2+ bilaterally  Neurologic: Non-focal  Psychiatry: A & O x 3, Mood & affect appropriate      LABS:	 	  CARDIAC MARKERS:                        10.9   4.61  )-----------( 239      ( 07 Oct 2021 07:12 )             34.9     10-07    139  |  102  |  19  ----------------------------<  122<H>  3.8   |  26  |  1.07    Ca    10.4      07 Oct 2021 07:12  Mg     1.4     10-07    TPro  6.8  /  Alb  4.1  /  TBili  0.5  /  DBili  x   /  AST  141<H>  /  ALT  167<H>  /  AlkPhos  112  10-06       PT/INR - ( 06 Oct 2021 11:47 )   PT: 12.7 sec;   INR: 1.06          PTT - ( 06 Oct 2021 11:47 )  PTT:31.2 sec    ASSESSMENT/PLAN: 	        DVT ppx:  Dispo:

## 2021-10-08 ENCOUNTER — NON-APPOINTMENT (OUTPATIENT)
Age: 66
End: 2021-10-08

## 2021-10-08 DIAGNOSIS — I27.20 PULMONARY HYPERTENSION, UNSPECIFIED: ICD-10-CM

## 2021-10-08 PROBLEM — Z00.00 ENCOUNTER FOR PREVENTIVE HEALTH EXAMINATION: Status: ACTIVE | Noted: 2021-10-08

## 2021-10-08 LAB
ALBUMIN SERPL ELPH-MCNC: 3.9 G/DL — SIGNIFICANT CHANGE UP (ref 3.3–5)
ALP SERPL-CCNC: 85 U/L — SIGNIFICANT CHANGE UP (ref 40–120)
ALT FLD-CCNC: 80 U/L — HIGH (ref 10–45)
ANION GAP SERPL CALC-SCNC: 11 MMOL/L — SIGNIFICANT CHANGE UP (ref 5–17)
AST SERPL-CCNC: 27 U/L — SIGNIFICANT CHANGE UP (ref 10–40)
BILIRUB SERPL-MCNC: 0.6 MG/DL — SIGNIFICANT CHANGE UP (ref 0.2–1.2)
BUN SERPL-MCNC: 21 MG/DL — SIGNIFICANT CHANGE UP (ref 7–23)
CALCIUM SERPL-MCNC: 11.1 MG/DL — HIGH (ref 8.4–10.5)
CHLORIDE SERPL-SCNC: 102 MMOL/L — SIGNIFICANT CHANGE UP (ref 96–108)
CO2 SERPL-SCNC: 27 MMOL/L — SIGNIFICANT CHANGE UP (ref 22–31)
CREAT SERPL-MCNC: 1.33 MG/DL — HIGH (ref 0.5–1.3)
ENA SCL70 AB SER-ACNC: <0.2 AI — SIGNIFICANT CHANGE UP
GLUCOSE SERPL-MCNC: 126 MG/DL — HIGH (ref 70–99)
HCT VFR BLD CALC: 38.7 % — SIGNIFICANT CHANGE UP (ref 34.5–45)
HGB BLD-MCNC: 11.7 G/DL — SIGNIFICANT CHANGE UP (ref 11.5–15.5)
HIV 1+2 AB+HIV1 P24 AG SERPL QL IA: SIGNIFICANT CHANGE UP
MAGNESIUM SERPL-MCNC: 1.7 MG/DL — SIGNIFICANT CHANGE UP (ref 1.6–2.6)
MCHC RBC-ENTMCNC: 25.2 PG — LOW (ref 27–34)
MCHC RBC-ENTMCNC: 30.2 GM/DL — LOW (ref 32–36)
MCV RBC AUTO: 83.2 FL — SIGNIFICANT CHANGE UP (ref 80–100)
NRBC # BLD: 0 /100 WBCS — SIGNIFICANT CHANGE UP (ref 0–0)
PLATELET # BLD AUTO: 247 K/UL — SIGNIFICANT CHANGE UP (ref 150–400)
POTASSIUM SERPL-MCNC: 3.7 MMOL/L — SIGNIFICANT CHANGE UP (ref 3.5–5.3)
POTASSIUM SERPL-SCNC: 3.7 MMOL/L — SIGNIFICANT CHANGE UP (ref 3.5–5.3)
PROT SERPL-MCNC: 6.8 G/DL — SIGNIFICANT CHANGE UP (ref 6–8.3)
RBC # BLD: 4.65 M/UL — SIGNIFICANT CHANGE UP (ref 3.8–5.2)
RBC # FLD: 17.9 % — HIGH (ref 10.3–14.5)
RHEUMATOID FACT SERPL-ACNC: <10 IU/ML — SIGNIFICANT CHANGE UP (ref 0–13)
SODIUM SERPL-SCNC: 140 MMOL/L — SIGNIFICANT CHANGE UP (ref 135–145)
T3 SERPL-MCNC: 70 NG/DL — LOW (ref 80–200)
T3FREE SERPL-MCNC: 2 PG/ML — SIGNIFICANT CHANGE UP (ref 1.8–4.6)
T4 AB SER-ACNC: 6.23 UG/DL — SIGNIFICANT CHANGE UP (ref 4.5–11.7)
T4 FREE SERPL-MCNC: 1.18 NG/DL — SIGNIFICANT CHANGE UP (ref 0.93–1.7)
TSH SERPL-MCNC: 0.66 UIU/ML — SIGNIFICANT CHANGE UP (ref 0.27–4.2)
TSH SERPL-MCNC: 0.67 UIU/ML — SIGNIFICANT CHANGE UP (ref 0.27–4.2)
WBC # BLD: 4.73 K/UL — SIGNIFICANT CHANGE UP (ref 3.8–10.5)
WBC # FLD AUTO: 4.73 K/UL — SIGNIFICANT CHANGE UP (ref 3.8–10.5)

## 2021-10-08 PROCEDURE — 76700 US EXAM ABDOM COMPLETE: CPT | Mod: 26

## 2021-10-08 PROCEDURE — 71250 CT THORAX DX C-: CPT | Mod: 26

## 2021-10-08 PROCEDURE — 99233 SBSQ HOSP IP/OBS HIGH 50: CPT

## 2021-10-08 PROCEDURE — 99233 SBSQ HOSP IP/OBS HIGH 50: CPT | Mod: GC

## 2021-10-08 PROCEDURE — 78582 LUNG VENTILAT&PERFUS IMAGING: CPT | Mod: 26

## 2021-10-08 PROCEDURE — 99233 SBSQ HOSP IP/OBS HIGH 50: CPT | Mod: GC,25

## 2021-10-08 RX ORDER — MAGNESIUM SULFATE 500 MG/ML
1 VIAL (ML) INJECTION ONCE
Refills: 0 | Status: COMPLETED | OUTPATIENT
Start: 2021-10-08 | End: 2021-10-08

## 2021-10-08 RX ORDER — ACETAMINOPHEN 500 MG
650 TABLET ORAL ONCE
Refills: 0 | Status: COMPLETED | OUTPATIENT
Start: 2021-10-08 | End: 2021-10-08

## 2021-10-08 RX ORDER — POTASSIUM CHLORIDE 20 MEQ
20 PACKET (EA) ORAL ONCE
Refills: 0 | Status: COMPLETED | OUTPATIENT
Start: 2021-10-08 | End: 2021-10-08

## 2021-10-08 RX ORDER — HEPARIN SODIUM 5000 [USP'U]/ML
5000 INJECTION INTRAVENOUS; SUBCUTANEOUS EVERY 8 HOURS
Refills: 0 | Status: DISCONTINUED | OUTPATIENT
Start: 2021-10-08 | End: 2021-10-12

## 2021-10-08 RX ORDER — ATORVASTATIN CALCIUM 80 MG/1
20 TABLET, FILM COATED ORAL AT BEDTIME
Refills: 0 | Status: DISCONTINUED | OUTPATIENT
Start: 2021-10-08 | End: 2021-10-08

## 2021-10-08 RX ADMIN — Medication 100 GRAM(S): at 10:12

## 2021-10-08 RX ADMIN — Medication 20 MILLIGRAM(S): at 05:11

## 2021-10-08 RX ADMIN — Medication 20 MILLIGRAM(S): at 13:22

## 2021-10-08 RX ADMIN — Medication 0.1 MILLIGRAM(S): at 05:12

## 2021-10-08 RX ADMIN — Medication 20 MILLIGRAM(S): at 22:14

## 2021-10-08 RX ADMIN — Medication 0.1 MILLIGRAM(S): at 17:57

## 2021-10-08 RX ADMIN — PANTOPRAZOLE SODIUM 40 MILLIGRAM(S): 20 TABLET, DELAYED RELEASE ORAL at 05:13

## 2021-10-08 RX ADMIN — Medication 650 MILLIGRAM(S): at 10:43

## 2021-10-08 RX ADMIN — Medication 650 MILLIGRAM(S): at 22:14

## 2021-10-08 RX ADMIN — ESCITALOPRAM OXALATE 10 MILLIGRAM(S): 10 TABLET, FILM COATED ORAL at 10:13

## 2021-10-08 RX ADMIN — ATENOLOL 50 MILLIGRAM(S): 25 TABLET ORAL at 10:13

## 2021-10-08 RX ADMIN — Medication 20 MILLIEQUIVALENT(S): at 10:18

## 2021-10-08 RX ADMIN — Medication 5 MILLIGRAM(S): at 05:12

## 2021-10-08 RX ADMIN — MONTELUKAST 10 MILLIGRAM(S): 4 TABLET, CHEWABLE ORAL at 10:13

## 2021-10-08 RX ADMIN — Medication 650 MILLIGRAM(S): at 18:57

## 2021-10-08 RX ADMIN — Medication 5 MILLIGRAM(S): at 17:58

## 2021-10-08 RX ADMIN — Medication 60 MILLIGRAM(S): at 05:12

## 2021-10-08 RX ADMIN — Medication 81 MILLIGRAM(S): at 10:13

## 2021-10-08 RX ADMIN — HEPARIN SODIUM 5000 UNIT(S): 5000 INJECTION INTRAVENOUS; SUBCUTANEOUS at 22:14

## 2021-10-08 RX ADMIN — HEPARIN SODIUM 5000 UNIT(S): 5000 INJECTION INTRAVENOUS; SUBCUTANEOUS at 13:21

## 2021-10-08 RX ADMIN — Medication 650 MILLIGRAM(S): at 11:43

## 2021-10-08 RX ADMIN — Medication 650 MILLIGRAM(S): at 17:57

## 2021-10-08 NOTE — DIETITIAN INITIAL EVALUATION ADULT. - OTHER INFO
66 F with developmental delay, HTN, HLD, DM2, asthma, and ?hx of MI who presents with progressive worsening GOYAL associated with chest discomfort x1-2 weeks, admitted to cardiac telemetry for dCHF and severe pulm HTN. Found to have elevated PASP on ECHO, confirmed severe pre-capillary hypertension via right-heart catheterization. Pulmonary following for workup and management of pulmonary hypertension. Underwent L heart cath 10/7, did not reveal CAD, R heart cath also performed, showed severe precapillary HTN.    On assessment, pt seen resting in bed. Pt reports UBW to be consistent with current adm wt of 150 lbs. Pt currently on DASH/TLC diet, pt reports good PO intake, ate toast eggs, steel and raisins. She reports able to eat ~50% of meals. Discussed with pt about DASH/TLC diet, she reports that RN at group home helps manage her diet for her but states that she normally follows a low salt diet. Review diet edu with pt, pt reports good understanding. No reported pain at time of assessment, Denies n/v/d. Last BM 10/5. Skin: Ovi 16, surgical incision noted. Edema 2+ R and L leg. RD to follow per protocol. 66 F with developmental delay, HTN, HLD, DM2, asthma, and ?hx of MI who presents with progressive worsening GOYAL associated with chest discomfort x1-2 weeks, admitted to cardiac telemetry for dCHF and severe pulm HTN. Found to have elevated PASP on ECHO, confirmed severe pre-capillary hypertension via right-heart catheterization. Pulmonary following for workup and management of pulmonary hypertension. Underwent L heart cath 10/7, did not reveal CAD, R heart cath also performed, showed severe precapillary HTN.    On assessment, pt seen resting in bed. Pt reports UBW to be consistent with current adm wt of 150 lbs. Pt currently on DASH/TLC diet, pt reports good PO intake, ate toast eggs, steel and raisins. She reports able to eat ~50% of meals. No cultural, ethnic, Jehovah's witness food preferences noted. NKFA. Discussed with pt about DASH/TLC diet, she reports that RN at group home helps manage her diet for her but states that she normally follows a low salt diet. Review diet edu with pt, pt reports good understanding. No reported pain at time of assessment, Denies n/v/d. Last BM 10/5. Skin: Ovi 16, surgical incision noted. Edema 2+ R and L leg. RD to follow per protocol.

## 2021-10-08 NOTE — PROGRESS NOTE ADULT - PROBLEM SELECTOR PLAN 4
- Pt on Pravastatin 10mg PO QD at home - not on formulary.   - LDL 61  - given elevated LFTs holing Statin for now, f/u LFTs in AM and consider resuming statin - CONT: Clonidine 0.1mg PO BID, Atenolol 50mg PO QD, and Nifedipine 60mg PO QD.   - HOLD: HCTZ 25mg PO QD (as pt is receiving Lasix). - CONT: Clonidine 0.1mg PO BID,  and Nifedipine 60mg PO QD.   - home HCTZ and atenolol 50 mg daily d/c.

## 2021-10-08 NOTE — PROGRESS NOTE ADULT - PROBLEM SELECTOR PLAN 1
SOB improved, non-pitting LE edema BNP 2156  - Echo 10/5: normal LV size/fxn, dilated RV, mildly reduced RV systolic fxn, dilated RA, mild TR, pulmHTN (PASP 101). (of note echo still states performed and no official results in sunrise)  - s/p R/LHC 10/7/21: non-obstructive CAD and pre-capillary pulm HTN; Pulm Dr. Marx following; pt to get VQ scan, non contrast CT, US of abdomen, Labs; Rhemoatoid Factor, anti-neutrophil cytoplasmic antibody (ANCA) ALEJANDRO, and antitopoisomerase antibody (SCL70), TFTs, HIV test (all sent).   - Continue ASA 81mg QD.    - s/p Lasox 20 IV BID (last dose 10/8 am); no PO lasix ordered as Cr. elevated 1.1>1.3 today, f/u am Cr  - elevated LFTs likely 2/2 hepatic congestion, trending down, f/u repeat BMP tomorrow.   - Strict I/O’s and daily weights; Core measures. SOB improved, non-pitting LE edema BNP 2156  - Echo 10/5: normal LV size/fxn, dilated RV, mildly reduced RV systolic fxn, dilated RA, mild TR, pulmHTN (PASP 101). (of note echo still states performed and no official results in sunrise)  - s/p R/LHC 10/7/21: non-obstructive CAD and pre-capillary pulm HTN; mPAP 51, PAWP 7, CO (by thermodilution) 3.3 --> PVR of 13.3WU,   - Continue ASA 81mg QD.    - s/p Lasox 20 IV BID (last dose 10/8 am); no PO lasix ordered as Cr. elevated 1.1>1.3 today, f/u am Cr  - elevated LFTs likely 2/2 hepatic congestion, trending down, f/u repeat BMP tomorrow.   - Strict I/O’s and daily weights; Core measures. SOB improved, non-pitting LE edema BNP 2156  - Echo 10/5: normal LV size/fxn, dilated RV, mildly reduced RV systolic fxn, dilated RA, mild TR, pulmHTN (PASP 101). (of note echo still states performed and no official results in sunrise)  - s/p R/LHC 10/7/21: non-obstructive CAD and pre-capillary pulm HTN; mPAP 51, PAWP 7, CO (by thermodilution) 3.3 --> PVR of 13.3WU,   - Continue ASA 81mg QD.    - s/p Lasix 20 IV BID (last dose 10/8 am); no PO lasix ordered as Cr. elevated 1.1>1.3 today, f/u am Cr  - elevated LFTs likely 2/2 hepatic congestion, trending down, f/u repeat BMP tomorrow.   - Strict I/O’s and daily weights; Core measures.

## 2021-10-08 NOTE — PROGRESS NOTE ADULT - SUBJECTIVE AND OBJECTIVE BOX
Interventional Cardiology PA Adult Progress Note    CC: chest pain/SOB   Subjective Assessment: Pt. seen and examined at bedside today am. Pt. reports feeling very weak; denies any chest pain, dizziness, palpitations, N/V.    ROS neg except as per subjective HPI.   	  MEDICATIONS:  ATENolol  Tablet 50 milliGRAM(s) Oral daily  cloNIDine 0.1 milliGRAM(s) Oral two times a day  NIFEdipine XL 60 milliGRAM(s) Oral daily  montelukast 10 milliGRAM(s) Oral daily  escitalopram 10 milliGRAM(s) Oral daily  pantoprazole    Tablet 40 milliGRAM(s) Oral before breakfast  aspirin enteric coated 81 milliGRAM(s) Oral daily  magnesium sulfate  IVPB 1 Gram(s) IV Intermittent once  oxybutynin 5 milliGRAM(s) Oral two times a day  potassium chloride    Tablet ER 20 milliEquivalent(s) Oral once      	    [PHYSICAL EXAM:  TELEMETRY:  T(C): 36.1 (10-08-21 @ 09:55), Max: 36.9 (10-07-21 @ 21:56)  HR: 55 (10-08-21 @ 08:32) (54 - 69)  BP: 130/62 (10-08-21 @ 08:32) (123/71 - 167/82)  RR: 19 (10-08-21 @ 08:32) (18 - 19)  SpO2: 95% (10-08-21 @ 08:32) (92% - 96%)  Wt(kg): --  I&O's Summary    07 Oct 2021 07:01  -  08 Oct 2021 07:00  --------------------------------------------------------  IN: 50 mL / OUT: 650 mL / NET: -600 mL    08 Oct 2021 07:01  -  08 Oct 2021 09:59  --------------------------------------------------------  IN: 180 mL / OUT: 0 mL / NET: 180 mL        [place]:  Central/PICC/Mid Line:                                         Gen: NAD  Neck: no JVD  CV: RRR, no murmurs noted  Pulm: CTA b/l; no w/r/r  GI: soft, NT/ND, + BS X 4  extremities: no clubbing, cyanosis and edema noted b/l, r rad access site stable, RIJ access site stable.   Neuro: AO X 3       	    ECG:  	  RADIOLOGY:   DIAGNOSTIC TESTING:  [ ] Echocardiogram:  [ ]  Catheterization:  [ ] Stress Test:    [ ] JOSEPH  OTHER: 	    LABS:	 	  CARDIAC MARKERS:                                  11.7   4.73  )-----------( 247      ( 08 Oct 2021 07:38 )             38.7     10-08    140  |  102  |  21  ----------------------------<  126<H>  3.7   |  27  |  1.33<H>    Ca    11.1<H>      08 Oct 2021 07:38  Mg     1.7     10-08    TPro  6.8  /  Alb  3.9  /  TBili  0.6  /  DBili  x   /  AST  27  /  ALT  80<H>  /  AlkPhos  85  10-08    proBNP:   Lipid Profile:   HgA1c:   TSH: Thyroid Stimulating Hormone, Serum: 0.667 uIU/mL (10-08 @ 07:38)  Thyroid Stimulating Hormone, Serum: 0.660 uIU/mL (10-08 @ 07:38)    PT/INR - ( 06 Oct 2021 11:47 )   PT: 12.7 sec;   INR: 1.06          PTT - ( 06 Oct 2021 11:47 )  PTT:31.2 sec    ASSESSMENT/PLAN: 	        DVT ppx:  Dispo:     Transfer note to [facility] (accepting Dr. Irvin Valdes)      65yo F w/ developmental delay and PMHx of HTN, HLD, DM T2, and Asthma. Presented w/ GOYAL, CP, and LE edema. BNP elevated. Cardiac enzymes not elevated. EKG w/ TWI II/III/aVF, V2-V6 (no prior EKG for comparison). Pt. admitted to cardiology service for dCHF and severe pulm HTN.  Pt s/p IV Lasix (last dose 10/8 am). Pt. s/p R/LHC 10/7/21: non-obstructive CAD and pre-capillary pulm HTN; Pulm Dr. Marx following; pt to get VQ scan, non contrast CT, US of abdomen, Labs; Rhemoatoid Factor, anti-neutrophil cytoplasmic antibody (ANCA) ALEJANDRO, and antitopoisomerase antibody (SCL70), TFTs, HIV test (all sent). Pt. started on Sildenafil 20 mg TID 10/8/21 for pulm HTN with plan to titrate up as tolerated.       : Pt. seen and examined at bedside today am. Pt. reports feeling very weak; denies any chest pain, dizziness, palpitations, N/V.    ROS neg except as per subjective HPI.   	  MEDICATIONS:  ATENolol  Tablet 50 milliGRAM(s) Oral daily  cloNIDine 0.1 milliGRAM(s) Oral two times a day  NIFEdipine XL 60 milliGRAM(s) Oral daily  montelukast 10 milliGRAM(s) Oral daily  escitalopram 10 milliGRAM(s) Oral daily  pantoprazole    Tablet 40 milliGRAM(s) Oral before breakfast  aspirin enteric coated 81 milliGRAM(s) Oral daily  magnesium sulfate  IVPB 1 Gram(s) IV Intermittent once  oxybutynin 5 milliGRAM(s) Oral two times a day  potassium chloride    Tablet ER 20 milliEquivalent(s) Oral once      	    [PHYSICAL EXAM:  TELEMETRY:  T(C): 36.1 (10-08-21 @ 09:55), Max: 36.9 (10-07-21 @ 21:56)  HR: 55 (10-08-21 @ 08:32) (54 - 69)  BP: 130/62 (10-08-21 @ 08:32) (123/71 - 167/82)  RR: 19 (10-08-21 @ 08:32) (18 - 19)  SpO2: 95% (10-08-21 @ 08:32) (92% - 96%)  Wt(kg): --  I&O's Summary    07 Oct 2021 07:01  -  08 Oct 2021 07:00  --------------------------------------------------------  IN: 50 mL / OUT: 650 mL / NET: -600 mL    08 Oct 2021 07:01  -  08 Oct 2021 09:59  --------------------------------------------------------  IN: 180 mL / OUT: 0 mL / NET: 180 mL        [place]:  Central/PICC/Mid Line:                                         Gen: NAD  Neck: no JVD  CV: RRR, no murmurs noted  Pulm: CTA b/l; no w/r/r  GI: soft, NT/ND, + BS X 4  extremities: no clubbing, cyanosis and edema noted b/l, r rad access site stable, RIJ access site stable.   Neuro: AO X 3       	    ECG:  	  RADIOLOGY:   DIAGNOSTIC TESTING:  [ ] Echocardiogram:  [ ]  Catheterization:  [ ] Stress Test:    [ ] JOSEPH  OTHER: 	    LABS:	 	  CARDIAC MARKERS:                                  11.7   4.73  )-----------( 247      ( 08 Oct 2021 07:38 )             38.7     10-08    140  |  102  |  21  ----------------------------<  126<H>  3.7   |  27  |  1.33<H>    Ca    11.1<H>      08 Oct 2021 07:38  Mg     1.7     10-08    TPro  6.8  /  Alb  3.9  /  TBili  0.6  /  DBili  x   /  AST  27  /  ALT  80<H>  /  AlkPhos  85  10-08    proBNP:   Lipid Profile:   HgA1c:   TSH: Thyroid Stimulating Hormone, Serum: 0.667 uIU/mL (10-08 @ 07:38)  Thyroid Stimulating Hormone, Serum: 0.660 uIU/mL (10-08 @ 07:38)    PT/INR - ( 06 Oct 2021 11:47 )   PT: 12.7 sec;   INR: 1.06          PTT - ( 06 Oct 2021 11:47 )  PTT:31.2 sec    ASSESSMENT/PLAN: 	        DVT ppx:  Dispo:     Transfer note to [facility] (accepting Dr. Irvin Valdes)      67yo F w/ developmental delay and PMHx of HTN, HLD, DM T2, and Asthma. Presented w/ GOYAL, CP, and LE edema. BNP elevated. Cardiac enzymes not elevated. EKG w/ TWI II/III/aVF, V2-V6 (no prior EKG for comparison). Pt. admitted to cardiology service for dCHF and severe pulm HTN.  Pt s/p IV Lasix (last dose 10/8 am). Pt. s/p R/LHC 10/7/21: non-obstructive CAD and pre-capillary pulm HTN; Pulm Dr. Marx following; pt to get VQ scan, non contrast CT, US of abdomen, Labs; Rhemoatoid Factor, anti-neutrophil cytoplasmic antibody (ANCA) ALEJANDRO, and antitopoisomerase antibody (SCL70), TFTs, HIV test (all sent). Pt. started on Sildenafil 20 mg TID 10/8/21 for pulm HTN with plan to titrate up as tolerated.       Pt. seen and examined at bedside today am. Pt. reports feeling very weak; denies any chest pain, dizziness, palpitations, N/V.    ROS neg except as per subjective HPI.   	  MEDICATIONS:  ATENolol  Tablet 50 milliGRAM(s) Oral daily  cloNIDine 0.1 milliGRAM(s) Oral two times a day  NIFEdipine XL 60 milliGRAM(s) Oral daily  montelukast 10 milliGRAM(s) Oral daily  escitalopram 10 milliGRAM(s) Oral daily  pantoprazole    Tablet 40 milliGRAM(s) Oral before breakfast  aspirin enteric coated 81 milliGRAM(s) Oral daily  magnesium sulfate  IVPB 1 Gram(s) IV Intermittent once  oxybutynin 5 milliGRAM(s) Oral two times a day  potassium chloride    Tablet ER 20 milliEquivalent(s) Oral once      	    [PHYSICAL EXAM:  TELEMETRY:  T(C): 36.1 (10-08-21 @ 09:55), Max: 36.9 (10-07-21 @ 21:56)  HR: 55 (10-08-21 @ 08:32) (54 - 69)  BP: 130/62 (10-08-21 @ 08:32) (123/71 - 167/82)  RR: 19 (10-08-21 @ 08:32) (18 - 19)  SpO2: 95% (10-08-21 @ 08:32) (92% - 96%)  Wt(kg): --  I&O's Summary    07 Oct 2021 07:01  -  08 Oct 2021 07:00  --------------------------------------------------------  IN: 50 mL / OUT: 650 mL / NET: -600 mL    08 Oct 2021 07:01  -  08 Oct 2021 09:59  --------------------------------------------------------  IN: 180 mL / OUT: 0 mL / NET: 180 mL        [place]:  Central/PICC/Mid Line:                                         Gen: NAD  Neck: no JVD  CV: RRR, no murmurs noted  Pulm: CTA b/l; no w/r/r  GI: soft, NT/ND, + BS X 4  extremities: no clubbing, cyanosis and edema noted b/l, r rad access site stable, RIJ access site stable.   Neuro: AO X 3       	    ECG:  	  RADIOLOGY:   DIAGNOSTIC TESTING:  [ ] Echocardiogram:  [ ]  Catheterization:  [ ] Stress Test:    [ ] JOSEPH  OTHER: 	    LABS:	 	  CARDIAC MARKERS:                                  11.7   4.73  )-----------( 247      ( 08 Oct 2021 07:38 )             38.7     10-08    140  |  102  |  21  ----------------------------<  126<H>  3.7   |  27  |  1.33<H>    Ca    11.1<H>      08 Oct 2021 07:38  Mg     1.7     10-08    TPro  6.8  /  Alb  3.9  /  TBili  0.6  /  DBili  x   /  AST  27  /  ALT  80<H>  /  AlkPhos  85  10-08    proBNP:   Lipid Profile:   HgA1c:   TSH: Thyroid Stimulating Hormone, Serum: 0.667 uIU/mL (10-08 @ 07:38)  Thyroid Stimulating Hormone, Serum: 0.660 uIU/mL (10-08 @ 07:38)    PT/INR - ( 06 Oct 2021 11:47 )   PT: 12.7 sec;   INR: 1.06          PTT - ( 06 Oct 2021 11:47 )  PTT:31.2 sec    ASSESSMENT/PLAN: 	        DVT ppx:  Dispo:

## 2021-10-08 NOTE — DIETITIAN INITIAL EVALUATION ADULT. - ADD RECOMMEND
1. Continue with DASH/TLC diet 2. Monitor %PO intake and need for ONS 3. BM regimen per team, if pt unable to have BM in 5 days, consider starting BM regimen 4. Trend wts 5. Monitor BMP, BG q6hrs, skin, renal indices, lytes, replete prn

## 2021-10-08 NOTE — DIETITIAN INITIAL EVALUATION ADULT. - OTHER CALCULATIONS
IBW used to calculate energy needs due to pt's current body weight exceeding 120% of IBW (125%). Needs based on pts with CHF. Fluids per team

## 2021-10-08 NOTE — PHYSICAL THERAPY INITIAL EVALUATION ADULT - ADDITIONAL COMMENTS
Pt states she lives with roommate in elevator building. Pt uses SC for ambulation and was independent with ADL's PTA.

## 2021-10-08 NOTE — PROGRESS NOTE ADULT - ATTENDING COMMENTS
66F with developmental delay (sister provides consent) with HTn, HLD, DM and asthma who p/w GOYAL and CP.    TTE 10/6/21 revealed normal LV function but RV dysfunction and RV dilation with PAP 101mmHg.    On my personal review of 10/6 TTE appreciate the LV function to be preserved, RV function appears severely reduced in PLAX views and at least moderately reduced in A4C views, RV is severely dilated, severe pHTN.  LHC 10/7/21 with non obstructive CAD, RHC 10/7/21 with RAP 9  PAP 95/35/55  PAWP 10 Thermodilution CO/CI 3.3/1.66, Chong (assumed VO2 296.87) 6.44/3.23, PVR (Thermodilution) 14WU  Currently asx, HDS, exam benign, clear lungs no MARQUEZ  Plan for:  Initiate Sildenafil 20mg TID   Discontinue Lasix as patient pre-renal and euvolemic  DC Plavix, continue ASA81 for primary prevention  Home beta blocker dc'd given resting SB 40s and to allow for BP room for initiation of pulmonary vasodilators  Consider initiation of low-moderate intensity statin pending DAILY LFT trends (will need to closely monitor with initiation of Opsumit)  BP control with home dose Clonidine 0.1 BID, Nifedipine 60 daily  PT evaluation remains pending  Patient to be transferred to [facility] service under Dr Irvin Valdes for initiation of pulmonary vasodilatory therapy for severe precapillary pHTN  Sugar Mata M.D.  Cardiology Attending  45minutes spent on total encounter; more than 50% of the visit was spent counseling and/or coordinating care by the attending physician, with plan of care discussed with the patient, pHTN attending Dr Barnett and cardiac team. 66F with developmental delay (sister provides consent) with HTn, HLD, DM and asthma who p/w GOYAL and CP.    TTE 10/6/21 revealed normal LV function but RV dysfunction and RV dilation with PAP 101mmHg.    On my personal review of 10/6 TTE appreciate the LV function to be preserved, RV function appears severely reduced in PLAX views and at least moderately reduced in A4C views, RV is severely dilated, severe pHTN.  LHC 10/7/21 with non obstructive CAD, RHC 10/7/21 with RAP 9  PAP 95/35/55  PAWP 10 Thermodilution CO/CI 3.3/1.66, Chong (assumed VO2 296.87) 6.44/3.23, PVR (Thermodilution) 14WU  Currently asx, HDS, exam benign, clear lungs no MARQUEZ  Plan for:  PHTN work up ordered:   -->Imaging: VQ scan, non contrast CT, US of abdomen  -->Labs; Rheumatoid Factor, anti-neutrophil cytoplasmic antibody (ANCA), ALEJANDRO, and antitopoisomerase antibody (SCL70), TFTs, HIV test  Initiate Sildenafil 20mg TID   Discontinue Lasix as patient pre-renal and euvolemic  DC Plavix, continue ASA81 for primary prevention  Home beta blocker dc'd given resting SB 40s and to allow for BP room for initiation of pulmonary vasodilators  Consider initiation of low-moderate intensity statin pending DAILY LFT trends (will need to closely monitor with initiation of Opsumit)  BP control with home dose Clonidine 0.1 BID, Nifedipine 60 daily  PT evaluation remains pending  Patient to be transferred to [facility] service under Dr Irvin Valdes for initiation of pulmonary vasodilatory therapy for severe precapillary pHTN  NOEL.Mauricio Mata M.D.  Cardiology Attending  45minutes spent on total encounter; more than 50% of the visit was spent counseling and/or coordinating care by the attending physician, with plan of care discussed with the patient, pHTN attending Dr Barnett and cardiac team.

## 2021-10-08 NOTE — CHART NOTE - NSCHARTNOTEFT_GEN_A_CORE
RHC Procedure results:      Baseline  RAP 9  PAP 95/35/55  PAWP 10  Thermodilution CO/CI 3.3/1.66, Chong (assumed VO2 296.87) 6.44/3.23  PVR (Thermodilution) 14WU    [place]  PAP 82/35/47  PAWP 10  Thermodilution CO/CI 3.45/1.72, Chong 5.27/2.65  PVR (Thermodilution): 10.7WU    Consistent with severe precapillary pulmonary arterial hypertension and low CO/CI. Of note, significant discordance between Chong and thermodilution CO/CI. Assumed Chong VO2 is 296.87 which is likely overestimated and falsely increasing CO/CI. In absence of severe TR or L to R shunt, would favor thermodilution. no

## 2021-10-08 NOTE — PROGRESS NOTE ADULT - PROBLEM SELECTOR PLAN 6
Hgb a1c 6.3  - Not on insulin at home; holding home Metformin 500mg PO BID while inpatient.     DVT ppx: Hep subQ  GI ppx: Pantoprazole 40mg PO QD.   Dispo: pending w/u for severe pulm HTN.   SW: consulted; pt lives at [facility] in [place]; will need discharge back to facility when ready    CAse d/w Dr. Martínez Hgb a1c 6.3  - Not on insulin at home; holding home Metformin 500mg PO BID while inpatient.     DVT ppx: Hep subQ  GI ppx: Pantoprazole 40mg PO QD.   Dispo: pending w/u for severe pulm HTN.   SW: consulted; pt lives at [facility] in [place]; will need discharge back to facility when ready    CAse d/w Dr. Martínez. Transfer to [facility] for further management of pulm HTN.

## 2021-10-08 NOTE — PROGRESS NOTE ADULT - PROBLEM SELECTOR PLAN 5
Hgb a1c 6.3  - Not on insulin at home; holding home Metformin 500mg PO BID while inpatient.     DVT ppx: Hep subQ  GI ppx: Pantoprazole 40mg PO QD.   Dispo: pending w/u for severe pulm HTN.   SW: consulted; pt lives at [facility] in [place]; will need discharge back to facility when ready    CAse d/w Dr. Martínez - Pt on Pravastatin 10mg PO QD at home - not on formulary.   - LDL 61  - given elevated LFTs holing Statin for now, f/u LFTs in AM and consider resuming statin

## 2021-10-08 NOTE — PHYSICAL THERAPY INITIAL EVALUATION ADULT - GENERAL OBSERVATIONS, REHAB EVAL
Pt received semi supine in bed +hep lock +2L NC +tele +primafit. PT received consent to treat from JINA Ferrer. pt was left as received with call bell in reach, VSS, and in NAD.

## 2021-10-08 NOTE — DIETITIAN INITIAL EVALUATION ADULT. - PROBLEM SELECTOR PLAN 5
--F/U A1c.   --Not on insulin at home; holding home Metformin 500mg PO BID while inpatient.       DVT ppx: Hep SQ  GI ppx: Pantoprazole 40mg PO QD.   Dispo: CAD work up - plan pending TTE results.  SW: consulted; pt lives at [facility] in [place]; will need discharge back to facility when ready

## 2021-10-08 NOTE — PHYSICAL THERAPY INITIAL EVALUATION ADULT - PERTINENT HX OF CURRENT PROBLEM, REHAB EVAL
66 F with developmental disability, HTN, HLD, DM2, asthma, and ?hx of MI who presents with progressive worsening GOYAL associated with chest discomfort x1-2 weeks, admitted to cardiac telemetry. Found to have elevated PASP on ECHO, confirmed severe pre-capillary hypertension via right-heart catheterization. Pulmonary following for workup and management of pulmonary hypertension.

## 2021-10-08 NOTE — PROGRESS NOTE ADULT - ASSESSMENT
65yo F w/ developmental delay and PMHx of HTN, HLD, DM T2, and Asthma. Presented w/ GOYAL, CP, and LE edema. BNP elevated. Cardiac enzymes not elevated. EKG w/ TWI II/III/aVF, V2-V6 (no prior EKG for comparison). Pt. admitted to cardiology service for dCHF and severe pulm HTN.  Pt s/p IV Lasix (last dose 10/8 am). Pt. s/p R/LHC 10/7/21: non-obstructive CAD and pre-capillary pulm HTN; Pulm Dr. Marx following; pt to get VQ scan, non contrast CT, US of abdomen, Labs; Rhemoatoid Factor, anti-neutrophil cytoplasmic antibody (ANCA) ALEJANDRO, and antitopoisomerase antibody (SCL70), TFTs, HIV test (all sent).    67yo F w/ developmental delay and PMHx of HTN, HLD, DM T2, and Asthma. Presented w/ GOYAL, CP, and LE edema. BNP elevated. Cardiac enzymes not elevated. EKG w/ TWI II/III/aVF, V2-V6 (no prior EKG for comparison). Pt. admitted to cardiology service for dCHF and severe pulm HTN.  Pt s/p IV Lasix (last dose 10/8 am). Pt. s/p R/LHC 10/7/21: non-obstructive CAD and pre-capillary pulm HTN; Pulm Dr. Marx following; pt to get VQ scan, non contrast CT, US of abdomen, Labs; Rhemoatoid Factor, anti-neutrophil cytoplasmic antibody (ANCA) ALEJANDRO, and antitopoisomerase antibody (SCL70), TFTs, HIV test (all sent).  Pt. started on Sildenafil 20 mg TID 10/8/21 for pulm HTN.

## 2021-10-08 NOTE — PROGRESS NOTE ADULT - ASSESSMENT
66 F with developmental delay, HTN, HLD, DM2, asthma, and ?hx of MI who presents with progressive worsening GOYAL associated with chest discomfort x1-2 weeks, admitted to cardiac telemetry. Found to have elevated PASP on ECHO, confirmed severe pre-capillary hypertension via right-heart catheterization. Pulmonary following for workup and management of pulmonary hypertension.    #Pulmonary hypertension  Review of ECHO (official read still pending) with evidence of pulmonary hypertension. Patient pending RHC today to further evaluate right-sided pressures and confirm PH. Patient with likely multiple etiologies of PH as she has multiple risk factors.   -Left heart catheterization 10/7 without evidence of obstructive coronary artery disease  -Pending formal read of ECHO to determine whether pt has heart failure, p/w progressive GOYAL and chest discomfort suggestive of angina. Family reports hx of MI ~30 years ago, pending [facility] to evaluate for coronary artery disease.  -Pt likely with component of OHS/RG. Pt obese, BMI ~38 (standing weight 100kg). Pt denies hx of sleep apnea however sister reports that she snores. STOP-BANG >4, indicating high risk for RG.  -Pt obese and mostly sedentary, with risk factors for hypercoagulable state and CTEPH  -Pt without any personal or family history of rheumatologic or connective tissue disorders. Social history negative for alcohol use, tobacco use, cocaine or other illicit drugs, diet pills.   Recommendations:    -f/u official ECHO report    -please start sildenafil 20mg tid    -please trend LFTs    -while inpatient, please obtain V/Q scan (to assess for group IV), CT chest without contrast (inspiratory and expiratory), abdominal ultrasound with doppler to assess for portal hypertension, full PFTs, and 6 minute-walk test    -pt will need home sleep study to assess for RG    -based on LFTs and how well pt tolerates sildenafil, will plan to add Opsumit 10mg qd.     Plan discussed with attending Dr. Shaw and primary team.  Will continue to follow.

## 2021-10-08 NOTE — PROGRESS NOTE ADULT - PROBLEM SELECTOR PLAN 2
CCS Class IV anginal symptome w/ GOYAL and chest pain w/ occasional symptoms at rest.    - EKG w/ diffuse TWI in II/III/aVF/V2-V6 (baseline EKG unknown).  - CE negative x2.   - s/p R/LHC 10/7/21: non-obstructive CAD and pre-capillary pulm HTN  - Continue ASA 81mg QD.  . - s/p R/LHC 10/7/21: non-obstructive CAD and pre-capillary pulm HTN; mPAP 51, PAWP 7, CO (by thermodilution) 3.3 --> PVR of 13.3WU,  - Pulm Dr. Marx following; pt to get VQ scan, non contrast CT, US of abdomen, Labs; Rhemoatoid Factor, anti-neutrophil cytoplasmic antibody (ANCA) ALEJANDRO, and antitopoisomerase antibody (SCL70), TFTs, HIV test (all sent).   - started on sildenafil 20 mg TID 10/8/21

## 2021-10-08 NOTE — PROGRESS NOTE ADULT - SUBJECTIVE AND OBJECTIVE BOX
REASON FOR CONSULT: pulmonary hypertension    INTERVAL/OVERNIGHT EVENTS: pt underwent left heart cath yesterday which did not reveal any coronary artery disease. right heart cath performed which showed mPAP 51, PAWP 7, CO (by thermodilution) 3.3 --> PVR of 13.3WU, indicating severe precapillary hypertension.    SUBJECTIVE HPI: Patient seen and examined at bedside. Patient resting comfortably, no complaints at this time. Denies shortness of breath or chest discomfort.       MEDICATIONS  (STANDING):  aspirin enteric coated 81 milliGRAM(s) Oral daily  ATENolol  Tablet 50 milliGRAM(s) Oral daily  cloNIDine 0.1 milliGRAM(s) Oral two times a day  escitalopram 10 milliGRAM(s) Oral daily  heparin   Injectable 5000 Unit(s) SubCutaneous every 8 hours  montelukast 10 milliGRAM(s) Oral daily  NIFEdipine XL 60 milliGRAM(s) Oral daily  oxybutynin 5 milliGRAM(s) Oral two times a day  pantoprazole    Tablet 40 milliGRAM(s) Oral before breakfast  sildenafil (REVATIO) 20 milliGRAM(s) Oral three times a day      VITAL SIGNS:  Vital Signs Last 24 Hrs  T(C): 36.1 (08 Oct 2021 09:55), Max: 36.9 (07 Oct 2021 21:56)  T(F): 96.9 (08 Oct 2021 09:55), Max: 98.5 (07 Oct 2021 21:56)  HR: 55 (08 Oct 2021 08:32) (54 - 69)  BP: 130/62 (08 Oct 2021 08:32) (123/71 - 167/82)  BP(mean): --  RR: 19 (08 Oct 2021 08:32) (18 - 19)  SpO2: 95% (08 Oct 2021 08:32) (92% - 96%)      PHYSICAL EXAM:  General: pleasant, obese, lying comfortably in bed ~15 degree angle.   Neurological: AAOx3  HEENT: clear conjunctiva, MMM  Neck: no JVD  Cardiovascular: +S1/S2, no M/R/G, RRR  Respiratory: CTA B/L in all lung fields, no diminished breath sounds, no increased work of breathing or accessory muscle use, speaking in full sentences  Gastrointestinal: soft, obese abd, NT/ND; active BSx4 quadrants  Genitourinary: no suprapubic tenderness  Extremities: WWP; no edema      LABS:                        11.7   4.73  )-----------( 247      ( 08 Oct 2021 07:38 )             38.7     10-08    140  |  102  |  21  ----------------------------<  126<H>  3.7   |  27  |  1.33<H>    Ca    11.1<H>      08 Oct 2021 07:38  Mg     1.7     10-08    TPro  6.8  /  Alb  3.9  /  TBili  0.6  /  DBili  x   /  AST  27  /  ALT  80<H>  /  AlkPhos  85  10-08    PT/INR - ( 06 Oct 2021 11:47 )   PT: 12.7 sec;   INR: 1.06   PTT - ( 06 Oct 2021 11:47 )  PTT:31.2 sec      CARDIAC MARKERS ( 06 Oct 2021 18:45 )  x     / <0.01 ng/mL / 99 U/L / x     / 1.3 ng/mL  CARDIAC MARKERS ( 06 Oct 2021 11:47 )  x     / <0.01 ng/mL / 106 U/L / x     / 1.5 ng/mL        RADIOLOGY & ADDITIONAL STUDIES: Reviewed. REASON FOR CONSULT: pulmonary hypertension    INTERVAL/OVERNIGHT EVENTS: pt underwent left heart cath yesterday which did not reveal any coronary artery disease. right heart cath performed which showed mPAP 51, PAWP 7, CO (by thermodilution) 3.3 --> PVR of 13.3WU, indicating severe precapillary hypertension.    SUBJECTIVE HPI: Patient seen and examined at bedside. Patient resting comfortably, no complaints at this time. Denies shortness of breath or chest discomfort.       MEDICATIONS  (STANDING):  aspirin enteric coated 81 milliGRAM(s) Oral daily  ATENolol  Tablet 50 milliGRAM(s) Oral daily  cloNIDine 0.1 milliGRAM(s) Oral two times a day  escitalopram 10 milliGRAM(s) Oral daily  heparin   Injectable 5000 Unit(s) SubCutaneous every 8 hours  montelukast 10 milliGRAM(s) Oral daily  NIFEdipine XL 60 milliGRAM(s) Oral daily  oxybutynin 5 milliGRAM(s) Oral two times a day  pantoprazole    Tablet 40 milliGRAM(s) Oral before breakfast  sildenafil (REVATIO) 20 milliGRAM(s) Oral three times a day      VITAL SIGNS:  Vital Signs Last 24 Hrs  T(C): 36.1 (08 Oct 2021 09:55), Max: 36.9 (07 Oct 2021 21:56)  T(F): 96.9 (08 Oct 2021 09:55), Max: 98.5 (07 Oct 2021 21:56)  HR: 55 (08 Oct 2021 08:32) (54 - 69)  BP: 130/62 (08 Oct 2021 08:32) (123/71 - 167/82)  BP(mean): --  RR: 19 (08 Oct 2021 08:32) (18 - 19)  SpO2: 95% (08 Oct 2021 08:32) (92% - 96%)      PHYSICAL EXAM:  General: pleasant, obese, lying comfortably in bed ~15 degree angle.   Neurological: AAOx3  HEENT: clear conjunctiva, MMM  Neck: no JVD  Cardiovascular: +S1/S2, no M/R/G, RRR  Respiratory: CTA B/L in all lung fields, no diminished breath sounds, no increased work of breathing or accessory muscle use, speaking in full sentences  Gastrointestinal: soft, obese abd, NT/ND; active BSx4 quadrants  Genitourinary: no suprapubic tenderness  Extremities: WWP; no edema      LABS:                        11.7   4.73  )-----------( 247      ( 08 Oct 2021 07:38 )             38.7     10-08    140  |  102  |  21  ----------------------------<  126<H>  3.7   |  27  |  1.33<H>    Ca    11.1<H>      08 Oct 2021 07:38  Mg     1.7     10-08    TPro  6.8  /  Alb  3.9  /  TBili  0.6  /  DBili  x   /  AST  27  /  ALT  80<H>  /  AlkPhos  85  10-08    PT/INR - ( 06 Oct 2021 11:47 )   PT: 12.7 sec;   INR: 1.06   PTT - ( 06 Oct 2021 11:47 )  PTT:31.2 sec      CARDIAC MARKERS ( 06 Oct 2021 18:45 )  x     / <0.01 ng/mL / 99 U/L / x     / 1.3 ng/mL  CARDIAC MARKERS ( 06 Oct 2021 11:47 )  x     / <0.01 ng/mL / 106 U/L / x     / 1.5 ng/mL          RHC Procedure results:      Baseline  RAP 9  PAP 95/35/55  PAWP 10  Thermodilution CO/CI 3.3/1.66, Chong (assumed VO2 296.87) 6.44/3.23  PVR (Thermodilution) 14WU    Wilver  PAP 82/35/47  PAWP 10  Thermodilution CO/CI 3.45/1.72, Chong 5.27/2.65  PVR (Thermodilution): 10.7WU    Consistent with severe precapillary pulmonary arterial hypertension and low CO/CI. Of note, significant discordance between Chong and thermodilution CO/CI. Assumed Chong VO2 is 296.87 which is likely overestimated and falsely increasing CO/CI. In absence of severe TR or L to R shunt, would favor thermodilution.    RADIOLOGY & ADDITIONAL STUDIES: Reviewed.

## 2021-10-08 NOTE — PROGRESS NOTE ADULT - ATTENDING COMMENTS
Pt seen and examined in cath lab, agree with above. Relatively acute onset of symptoms, but per review with sister and patient (both poor historians), SOB may have been ongoing for "quite a while", difficult to ascertain exact timing of symptoms. However, presented with orthopnea and SOB with TWI concerning for ACS, but TTE with severely elevated PASP. No significant risk factors for WHO Group I PAH; she does have HTN and obesity w/ Mallampati IV which places her at risk for WHO Group II and III disease, no history of DVT/PE, no history of hematologic disorders, kidney disease, sarcoidosis, or malignancy.    Now s/p RHC which confirms severe precapillary PH (see chart note) - mean PAP 55mmHg, PAWP 10mmHg, CO/CI 3.3/1.66 by thermodilution, PVR 14 FREDERICK. No evidence of vasoreactivity - does not meet criteria, although patient is already on CCB which can effect results.       Suspect she has idiopathic PAH; however, requires full work up as above to r/o other causes      Plan  - follow up laboratory and imaging studies. Will need outpatient PFTs, 6MWT, and HST  - Start sildenafil 20mg TID  - Trend LFTs, if stable and tolerating Sildenafil, can start Opsumit 10mg daily this weekend, will need to enroll patient in REMS program prior to starting. Paperwork submitted; however, needs to have sign consent. Discussed with patient's representative committee who will review REMS form and fax back when completed. Will need to ensure patient has coverage for medication prior to discharge.  - Have initiated prior authorization for Opsumit as well  - Will need close F/U with PAH clinic Pt seen and examined in cath lab, agree with above. Relatively acute onset of symptoms, but per review with sister and patient (both poor historians), SOB may have been ongoing for "quite a while", difficult to ascertain exact timing of symptoms. However, presented with orthopnea and SOB with TWI concerning for ACS, but TTE with severely elevated PASP. No significant risk factors for WHO Group I PAH; she does have HTN and obesity w/ Mallampati IV which places her at risk for WHO Group II and III disease, no history of DVT/PE, no history of hematologic disorders, kidney disease, sarcoidosis, or malignancy.    Now s/p RHC which confirms severe precapillary PH (see chart note) - mean PAP 55mmHg, PAWP 10mmHg, CO/CI 3.3/1.66 by thermodilution, PVR 14 FREDERICK. No evidence of vasoreactivity - does not meet criteria, although patient is already on CCB which can effect results.       Suspect she has idiopathic PAH; however, requires full work up as above to r/o other causes      Plan  - follow up laboratory and imaging studies. Will need outpatient PFTs, 6MWT, and HST  - Start sildenafil 20mg TID, consider d/c ing atenolol given sildenafil will likely decrease BP and patient on multiple antihypertensives  - Trend LFTs, if stable and tolerating Sildenafil, can start Opsumit 10mg daily this weekend, will need to enroll patient in REMS program prior to starting. Paperwork submitted; however, needs to have sign consent. Discussed with patient's representative committee who will review REMS form and fax back when completed. Will need to ensure patient has coverage for medication prior to discharge.  - Have initiated prior authorization for Opsumit as well  - Will need close F/U with PAH clinic    ADDENDUM  - Given new start to oral pulmonary vasodilators, will transfer to [facility]. Discussed with Dr. Martínez (cardiology) and Dr. Valdes (accepting ICU physician)

## 2021-10-08 NOTE — PROGRESS NOTE ADULT - ASSESSMENT
66 F with developmental disability, HTN, HLD, DM2, asthma, and ?hx of MI who presents with progressive worsening GOYAL associated with chest discomfort x1-2 weeks, admitted to cardiac telemetry. Found to have elevated PASP on ECHO, confirmed severe pre-capillary hypertension via right-heart catheterization. Pulmonary following for workup and management of pulmonary hypertension.    NEUROLOGY  # No active issues     CARDIOVASCULAR  #R/o RHF   - Presents w/ progressive dyspnea and chest pain   - Left heart cath on 10/7 negative for evidence of obstructive coronary artery disease   - EKG w/ diffuse TWI in II/III/aVF/V2-V6  - S/p Lasix 20mg IV BID   - Cardiology following     Plan:   - F/u TTE report   - Hold Lasix given impaired renal function  - c/w aspirin 81mg    #Hypertension   - Home med atenolol 50mg qd, clonidine 0.1 BID, hydrochlorothiazide 25mg qd      Plan:   -c/w Clonidine 0.1 BID and Nifedipine 60mg qd  - Hold home atenolol due to bradycardia and to avoid hypotension starting Sildenafil   - Hold  hydrochlorothiazide 25mg due to impaired renal function     #Hyperlipidemia   - Home med pravastatin 10mg     Plan:   - Hold statin in the setting of transaminitis     PULMONARY  # Pulmonary Hypertension   - component of underlying RG/OHS given elevated BMI      Plan:   - f/u V/Q scan   - f/u CT chest   - f/u PFTs   - f/u rheum w/u for underlying causes of type 1 pHTN   - c/w Sildenafil 20mg TID   - Plan to add Opsumit 10mg qd    - Plan for     GASTROINTESTINAL  #Transaminitis   - AST: 141, ALT: 167 on presentation    Plan:   - No abnormalities on abdominal ultrasound   - continue to hold statin   - LFTs downtrending     RENAL  #  INFECTIOUS DISEASE  #  ENDOCRINE  #  HEME  #  FLUIDS/ELECTROLYTES/NUTRITION  -IVF  -Monitor, Replete to K>4 and Mg>2  -Diet  PROPHYLAXIS  -DVT  -GI    DISPO  CODE STATUS 66 F with developmental disability, HTN, HLD, DM2, asthma, and ?hx of MI who presents with progressive worsening GOYAL associated with chest discomfort x1-2 weeks, admitted to cardiac telemetry. Found to have elevated PASP on ECHO, confirmed severe pre-capillary hypertension via right-heart catheterization. Pulmonary following for workup and management of pulmonary hypertension.    NEUROLOGY  # Anxiety/Depression  - c/w home Lexapro 10mg qd     CARDIOVASCULAR  #R/o RHF   - Presents w/ progressive dyspnea and chest pain   - Left heart cath on 10/7 negative for evidence of obstructive coronary artery disease   - EKG w/ diffuse TWI in II/III/aVF/V2-V6  - S/p Lasix 20mg IV BID   - Cardiology following     Plan:   - F/u TTE report   - Hold Lasix given impaired renal function  - c/w aspirin 81mg    #Hypertension   - Home med atenolol 50mg qd, clonidine 0.1 BID, hydrochlorothiazide 25mg qd      Plan:   -c/w Clonidine 0.1 BID and Nifedipine 60mg qd  - Hold home atenolol due to bradycardia and to avoid hypotension starting Sildenafil   - Hold  hydrochlorothiazide 25mg due to impaired renal function     #Hyperlipidemia   - Home med pravastatin 10mg     Plan:   - Hold statin in the setting of transaminitis     PULMONARY  # Pulmonary Hypertension   - s/p RHC which confirms severe precapillary PH:  mean PAP 55mmHg, PAWP 10mmHg, CO/CI 3.3/1.66 by thermodilution, PVR 14 FREDERICK  - No evidence of vasoreactivity on RHC - does not meet criteria  - potential component of underlying RG/OHS given elevated BMI  - work up for etiology of pHTN ongoing, pulm suspects idiopathic PAH    Plan:   - f/u V/Q scan to r/o CTEPH  - f/u CT chest   - f/u PFTs   - f/u 6 min walk test   - f/u rheum w/u for underlying causes of type 1 pHTN: ALEJANDRO, anca, scleroderma antibodies   - c/w Sildenafil 20mg TID   - Plan to add Opsumit 10mg qd    - Plan for outpatient sleep study to assess RG     #Asthma   - Home meds: Advair 2 puffs BID, fluticasone 50mcg qd, Montelukast 10mg qd, Ventolin q6    Plan:  -c/w Montelukast 10mg     GASTROINTESTINAL    #Transaminitis   - AST: 141, ALT: 167 on presentation    Plan:   - No abnormalities on abdominal ultrasound   - continue to hold statin   - LFTs downtrending     RENAL  #Impaired Renal Function   - Cr trending upward in the setting of diuresis: 1.01 > 1.33    Plan:   - Lasix 20mg BID held     #Overeactive Bladder   - c/w home oxybutynin 10mg qd       INFECTIOUS DISEASE  #No active issues     ENDOCRINE  #DM  - On Metformin 500mg BID at home   - A1C: 6.3    Plan:   -ISS         HEME  #No active issues       FLUIDS/ELECTROLYTES/NUTRITION    -IVF: None   -Monitor, Replete to K>4 and Mg>2  -Diet: Dash/TLC    PROPHYLAXIS  -DVT: Heparin subQ  -GI: Protonix 40mg qd     DISPO: [facility] > [facility]  CODE STATUS: Full Code

## 2021-10-08 NOTE — PROGRESS NOTE ADULT - SUBJECTIVE AND OBJECTIVE BOX
Transfer 5U to 7L     67yo F w/ developmental delay and PMHx of HTN, HLD, DM T2, and Asthma. Presented w/ GOYAL, CP, and LE edema. BNP elevated. Cardiac enzymes not elevated. EKG w/ TWI II/III/aVF, V2-V6 (no prior EKG for comparison). Pt. admitted to cardiology service for dCHF and severe pulm HTN.  Pt s/p IV Lasix (last dose 10/8 am). Pt. s/p R/LHC 10/7/21: non-obstructive CAD and pre-capillary pulm HTN; Pulm Dr. Marx following; pt to get VQ scan, non contrast CT, US of abdomen, Labs; Rhemoatoid Factor, anti-neutrophil cytoplasmic antibody (ANCA) ALEJANDRO, and antitopoisomerase antibody (SCL70), TFTs, HIV test (all sent). Pt. started on Sildenafil 20 mg TID 10/8/21 for pulm HTN with plan to titrate up as tolerated.     **INCOMPLETE NOTE    INTERVAL HPI/OVERNIGHT EVENTS:    OVERNIGHT: No overnight events.  SUBJECTIVE: Patient seen and examined at bedside.     ROS:  CV: Denies chest pain  Resp: Denies SOB  GI: Denies abdominal pain, constipation, diarrhea, nausea, vomiting  : Denies dysuria  ID: Denies fevers, chills  MSK: Denies joint pain     OBJECTIVE:    VITAL SIGNS:  ICU Vital Signs Last 24 Hrs  T(C): 36.4 (08 Oct 2021 13:25), Max: 36.9 (07 Oct 2021 21:56)  T(F): 97.6 (08 Oct 2021 13:25), Max: 98.5 (07 Oct 2021 21:56)  HR: 49 (08 Oct 2021 17:55) (46 - 69)  BP: 128/63 (08 Oct 2021 17:55) (123/71 - 167/82)  BP(mean): --  ABP: --  ABP(mean): --  RR: 19 (08 Oct 2021 17:55) (18 - 19)  SpO2: 93% (08 Oct 2021 17:55) (92% - 96%)        10-07 @ 07:01  -  10-08 @ 07:00  --------------------------------------------------------  IN: 50 mL / OUT: 650 mL / NET: -600 mL    10-08 @ 07:01  -  10-08 @ 18:30  --------------------------------------------------------  IN: 280 mL / OUT: 300 mL / NET: -20 mL      CAPILLARY BLOOD GLUCOSE          PHYSICAL EXAM:  General: NAD, comfortable  HEENT: NCAT, PERRL, clear conjunctiva, no scleral icterus  Neck: supple, no JVD  Respiratory: CTA b/l, no wheezing, rhonchi, rales  Cardiovascular: RRR, normal S1S2, no M/R/G  Vascular: 2+ radial and DP pulses  Abdomen: soft, NT/ND, bowel sounds in all four quadrants, no palpable masses  Extremities: WWP, no clubbing, cyanosis, or edema  Skin: No rashes present  Neuro:     MEDICATIONS:  MEDICATIONS  (STANDING):  aspirin enteric coated 81 milliGRAM(s) Oral daily  cloNIDine 0.1 milliGRAM(s) Oral two times a day  escitalopram 10 milliGRAM(s) Oral daily  heparin   Injectable 5000 Unit(s) SubCutaneous every 8 hours  montelukast 10 milliGRAM(s) Oral daily  NIFEdipine XL 60 milliGRAM(s) Oral daily  oxybutynin 5 milliGRAM(s) Oral two times a day  pantoprazole    Tablet 40 milliGRAM(s) Oral before breakfast  sildenafil (REVATIO) 20 milliGRAM(s) Oral three times a day    MEDICATIONS  (PRN):      ALLERGIES:  Allergies    ACE inhibitors (Unknown)  Motrin (Other)    Intolerances        LABS:                        11.7   4.73  )-----------( 247      ( 08 Oct 2021 07:38 )             38.7     10-08    140  |  102  |  21  ----------------------------<  126<H>  3.7   |  27  |  1.33<H>    Ca    11.1<H>      08 Oct 2021 07:38  Mg     1.7     10-08    TPro  6.8  /  Alb  3.9  /  TBili  0.6  /  DBili  x   /  AST  27  /  ALT  80<H>  /  AlkPhos  85  10-08          RADIOLOGY & ADDITIONAL TESTS: Reviewed. Transfer 5U to 7L     67yo F w/ developmental disability and PMHx of HTN, HLD, DM T2, and Asthma. Presented w/ GOYAL, CP, and LE edema. BNP elevated. Cardiac enzymes not elevated. EKG w/ TWI II/III/aVF, V2-V6 (no prior EKG for comparison). Pt. admitted to cardiology service for dCHF and severe pulm HTN.  Pt s/p IV Lasix (last dose 10/8 am). Pt. s/p R/LHC 10/7/21: non-obstructive CAD and pre-capillary pulm HTN; Pulm Dr. Marx following; pt to get VQ scan, non contrast CT, US of abdomen, Labs; Rhemoatoid Factor, anti-neutrophil cytoplasmic antibody (ANCA) ALEJANDRO, and antitopoisomerase antibody (SCL70), TFTs, HIV test (all sent). Pt. started on Sildenafil 20 mg TID 10/8/21 for pulm HTN with plan to titrate up as tolerated.       SUBJECTIVE: Patient seen and examined at bedside. Endorses mild chest pain. No additional complaints.       ROS: 12 point ROS otherwise negative unless stated above    OBJECTIVE:    VITAL SIGNS:  ICU Vital Signs Last 24 Hrs  T(C): 36.4 (08 Oct 2021 13:25), Max: 36.9 (07 Oct 2021 21:56)  T(F): 97.6 (08 Oct 2021 13:25), Max: 98.5 (07 Oct 2021 21:56)  HR: 49 (08 Oct 2021 17:55) (46 - 69)  BP: 128/63 (08 Oct 2021 17:55) (123/71 - 167/82)  BP(mean): --  ABP: --  ABP(mean): --  RR: 19 (08 Oct 2021 17:55) (18 - 19)  SpO2: 93% (08 Oct 2021 17:55) (92% - 96%)        10-07 @ 07:01  -  10-08 @ 07:00  --------------------------------------------------------  IN: 50 mL / OUT: 650 mL / NET: -600 mL    10-08 @ 07:01  -  10-08 @ 18:30  --------------------------------------------------------  IN: 280 mL / OUT: 300 mL / NET: -20 mL      CAPILLARY BLOOD GLUCOSE      PHYSICAL EXAM:  General: NAD, comfortable  HEENT: PERRL, clear conjunctiva, no scleral icterus, MMM  Neck: supple, no JVD  Respiratory: CTA b/l, no wheezing, rhonchi, rales  Cardiovascular: RRR, normal S1S2, no M/R/G  Vascular: 2+ radial and DP pulses  Abdomen: soft, NT/ND, bowel sounds in all four quadrants, no palpable masses  Extremities: WWP, no clubbing, cyanosis, or edema  Skin: Dry skin along anterior chest wall    MEDICATIONS:  MEDICATIONS  (STANDING):  aspirin enteric coated 81 milliGRAM(s) Oral daily  cloNIDine 0.1 milliGRAM(s) Oral two times a day  escitalopram 10 milliGRAM(s) Oral daily  heparin   Injectable 5000 Unit(s) SubCutaneous every 8 hours  montelukast 10 milliGRAM(s) Oral daily  NIFEdipine XL 60 milliGRAM(s) Oral daily  oxybutynin 5 milliGRAM(s) Oral two times a day  pantoprazole    Tablet 40 milliGRAM(s) Oral before breakfast  sildenafil (REVATIO) 20 milliGRAM(s) Oral three times a day    MEDICATIONS  (PRN):      ALLERGIES:  Allergies    ACE inhibitors (Unknown)  Motrin (Other)    Intolerances        LABS:                        11.7   4.73  )-----------( 247      ( 08 Oct 2021 07:38 )             38.7     10-08    140  |  102  |  21  ----------------------------<  126<H>  3.7   |  27  |  1.33<H>    Ca    11.1<H>      08 Oct 2021 07:38  Mg     1.7     10-08    TPro  6.8  /  Alb  3.9  /  TBili  0.6  /  DBili  x   /  AST  27  /  ALT  80<H>  /  AlkPhos  85  10-08          RADIOLOGY & ADDITIONAL TESTS: Reviewed.

## 2021-10-08 NOTE — PROGRESS NOTE ADULT - PROBLEM SELECTOR PLAN 3
- CONT: Clonidine 0.1mg PO BID, Atenolol 50mg PO QD, and Nifedipine 60mg PO QD.   - HOLD: HCTZ 25mg PO QD (as pt is receiving Lasix). CCS Class IV anginal symptome w/ GOYAL and chest pain w/ occasional symptoms at rest.    - EKG w/ diffuse TWI in II/III/aVF/V2-V6 (baseline EKG unknown).  - CE negative x2.   - s/p R/LHC 10/7/21: non-obstructive CAD and pre-capillary pulm HTN  - Continue ASA 81mg QD.  .

## 2021-10-09 LAB
ALBUMIN SERPL ELPH-MCNC: 3.5 G/DL — SIGNIFICANT CHANGE UP (ref 3.3–5)
ALP SERPL-CCNC: 82 U/L — SIGNIFICANT CHANGE UP (ref 40–120)
ALT FLD-CCNC: 59 U/L — HIGH (ref 10–45)
ANION GAP SERPL CALC-SCNC: 14 MMOL/L — SIGNIFICANT CHANGE UP (ref 5–17)
AST SERPL-CCNC: 28 U/L — SIGNIFICANT CHANGE UP (ref 10–40)
BILIRUB SERPL-MCNC: 0.5 MG/DL — SIGNIFICANT CHANGE UP (ref 0.2–1.2)
BUN SERPL-MCNC: 21 MG/DL — SIGNIFICANT CHANGE UP (ref 7–23)
CALCIUM SERPL-MCNC: 10.5 MG/DL — SIGNIFICANT CHANGE UP (ref 8.4–10.5)
CHLORIDE SERPL-SCNC: 105 MMOL/L — SIGNIFICANT CHANGE UP (ref 96–108)
CO2 SERPL-SCNC: 19 MMOL/L — LOW (ref 22–31)
CREAT SERPL-MCNC: 0.96 MG/DL — SIGNIFICANT CHANGE UP (ref 0.5–1.3)
GLUCOSE BLDC GLUCOMTR-MCNC: 129 MG/DL — HIGH (ref 70–99)
GLUCOSE BLDC GLUCOMTR-MCNC: 200 MG/DL — HIGH (ref 70–99)
GLUCOSE SERPL-MCNC: 119 MG/DL — HIGH (ref 70–99)
MAGNESIUM SERPL-MCNC: 1.9 MG/DL — SIGNIFICANT CHANGE UP (ref 1.6–2.6)
PHOSPHATE SERPL-MCNC: 3.3 MG/DL — SIGNIFICANT CHANGE UP (ref 2.5–4.5)
POTASSIUM SERPL-MCNC: 4.1 MMOL/L — SIGNIFICANT CHANGE UP (ref 3.5–5.3)
POTASSIUM SERPL-SCNC: 4.1 MMOL/L — SIGNIFICANT CHANGE UP (ref 3.5–5.3)
PROT SERPL-MCNC: 6.7 G/DL — SIGNIFICANT CHANGE UP (ref 6–8.3)
SODIUM SERPL-SCNC: 138 MMOL/L — SIGNIFICANT CHANGE UP (ref 135–145)

## 2021-10-09 PROCEDURE — 99233 SBSQ HOSP IP/OBS HIGH 50: CPT | Mod: GC

## 2021-10-09 RX ORDER — SODIUM CHLORIDE 0.65 %
1 AEROSOL, SPRAY (ML) NASAL
Refills: 0 | Status: DISCONTINUED | OUTPATIENT
Start: 2021-10-09 | End: 2021-10-12

## 2021-10-09 RX ORDER — MACITENTAN 10 MG/1
10 TABLET, FILM COATED ORAL DAILY
Refills: 0 | Status: DISCONTINUED | OUTPATIENT
Start: 2021-10-09 | End: 2021-10-12

## 2021-10-09 RX ORDER — SODIUM CHLORIDE 9 MG/ML
1000 INJECTION, SOLUTION INTRAVENOUS
Refills: 0 | Status: DISCONTINUED | OUTPATIENT
Start: 2021-10-09 | End: 2021-10-12

## 2021-10-09 RX ORDER — DEXTROSE 50 % IN WATER 50 %
12.5 SYRINGE (ML) INTRAVENOUS ONCE
Refills: 0 | Status: DISCONTINUED | OUTPATIENT
Start: 2021-10-09 | End: 2021-10-12

## 2021-10-09 RX ORDER — INSULIN LISPRO 100/ML
VIAL (ML) SUBCUTANEOUS AT BEDTIME
Refills: 0 | Status: DISCONTINUED | OUTPATIENT
Start: 2021-10-09 | End: 2021-10-12

## 2021-10-09 RX ORDER — DEXTROSE 50 % IN WATER 50 %
15 SYRINGE (ML) INTRAVENOUS ONCE
Refills: 0 | Status: DISCONTINUED | OUTPATIENT
Start: 2021-10-09 | End: 2021-10-12

## 2021-10-09 RX ORDER — GLUCAGON INJECTION, SOLUTION 0.5 MG/.1ML
1 INJECTION, SOLUTION SUBCUTANEOUS ONCE
Refills: 0 | Status: DISCONTINUED | OUTPATIENT
Start: 2021-10-09 | End: 2021-10-12

## 2021-10-09 RX ORDER — DEXTROSE 50 % IN WATER 50 %
25 SYRINGE (ML) INTRAVENOUS ONCE
Refills: 0 | Status: DISCONTINUED | OUTPATIENT
Start: 2021-10-09 | End: 2021-10-12

## 2021-10-09 RX ORDER — ACETAMINOPHEN 500 MG
650 TABLET ORAL EVERY 6 HOURS
Refills: 0 | Status: DISCONTINUED | OUTPATIENT
Start: 2021-10-09 | End: 2021-10-12

## 2021-10-09 RX ADMIN — Medication 650 MILLIGRAM(S): at 21:19

## 2021-10-09 RX ADMIN — HEPARIN SODIUM 5000 UNIT(S): 5000 INJECTION INTRAVENOUS; SUBCUTANEOUS at 13:28

## 2021-10-09 RX ADMIN — Medication 650 MILLIGRAM(S): at 13:29

## 2021-10-09 RX ADMIN — Medication 1 SPRAY(S): at 18:11

## 2021-10-09 RX ADMIN — Medication 20 MILLIGRAM(S): at 13:29

## 2021-10-09 RX ADMIN — PANTOPRAZOLE SODIUM 40 MILLIGRAM(S): 20 TABLET, DELAYED RELEASE ORAL at 06:27

## 2021-10-09 RX ADMIN — Medication 0.1 MILLIGRAM(S): at 06:29

## 2021-10-09 RX ADMIN — Medication 5 MILLIGRAM(S): at 10:48

## 2021-10-09 RX ADMIN — Medication 100 MILLIGRAM(S): at 01:12

## 2021-10-09 RX ADMIN — Medication 20 MILLIGRAM(S): at 06:27

## 2021-10-09 RX ADMIN — Medication 1 SPRAY(S): at 22:41

## 2021-10-09 RX ADMIN — Medication 650 MILLIGRAM(S): at 15:57

## 2021-10-09 RX ADMIN — Medication 5 MILLIGRAM(S): at 18:10

## 2021-10-09 RX ADMIN — MACITENTAN 10 MILLIGRAM(S): 10 TABLET, FILM COATED ORAL at 18:09

## 2021-10-09 RX ADMIN — Medication 20 MILLIGRAM(S): at 21:14

## 2021-10-09 RX ADMIN — HEPARIN SODIUM 5000 UNIT(S): 5000 INJECTION INTRAVENOUS; SUBCUTANEOUS at 21:14

## 2021-10-09 RX ADMIN — Medication 650 MILLIGRAM(S): at 21:50

## 2021-10-09 RX ADMIN — Medication 60 MILLIGRAM(S): at 10:48

## 2021-10-09 RX ADMIN — HEPARIN SODIUM 5000 UNIT(S): 5000 INJECTION INTRAVENOUS; SUBCUTANEOUS at 06:28

## 2021-10-09 RX ADMIN — Medication 81 MILLIGRAM(S): at 11:22

## 2021-10-09 RX ADMIN — Medication 0.1 MILLIGRAM(S): at 18:09

## 2021-10-09 RX ADMIN — Medication 1 SPRAY(S): at 18:15

## 2021-10-09 RX ADMIN — MONTELUKAST 10 MILLIGRAM(S): 4 TABLET, CHEWABLE ORAL at 11:22

## 2021-10-09 RX ADMIN — ESCITALOPRAM OXALATE 10 MILLIGRAM(S): 10 TABLET, FILM COATED ORAL at 11:22

## 2021-10-09 NOTE — PROGRESS NOTE ADULT - ASSESSMENT
66 F with developmental disability, HTN, HLD, DM2, asthma, and ?hx of MI who presents with progressive worsening GOYAL associated with chest discomfort x1-2 weeks, admitted to cardiac telemetry. Found to have elevated PASP on ECHO, confirmed severe pre-capillary hypertension via right-heart catheterization. Pulmonary following for workup and management of pulmonary hypertension.    NEUROLOGY  # Anxiety/Depression  - c/w home Lexapro 10mg qd     CARDIOVASCULAR  #R/o RHF   - Presents w/ progressive dyspnea and chest pain   - Left heart cath on 10/7 negative for evidence of obstructive coronary artery disease   - EKG w/ diffuse TWI in II/III/aVF/V2-V6  - S/p Lasix 20mg IV BID   - Cardiology following     Plan:   - F/u TTE report   - Hold Lasix given impaired renal function  - c/w aspirin 81mg    #Hypertension   - Home med atenolol 50mg qd, clonidine 0.1 BID, hydrochlorothiazide 25mg qd      Plan:   -c/w Clonidine 0.1 BID and Nifedipine 60mg qd  - Hold home atenolol due to bradycardia and to avoid hypotension starting Sildenafil   - Hold  hydrochlorothiazide 25mg due to impaired renal function     #Hyperlipidemia   - Home med pravastatin 10mg     Plan:   - Hold statin in the setting of transaminitis     PULMONARY  # Pulmonary Hypertension   - s/p RHC which confirms severe precapillary PH:  mean PAP 55mmHg, PAWP 10mmHg, CO/CI 3.3/1.66 by thermodilution, PVR 14 FREDERICK  - No evidence of vasoreactivity on RHC - does not meet criteria  - potential component of underlying RG/OHS given elevated BMI  - work up for etiology of pHTN ongoing, pulm suspects idiopathic PAH    Plan:   - f/u V/Q scan to r/o CTEPH  - f/u CT chest   - f/u PFTs   - f/u 6 min walk test   - f/u rheum w/u for underlying causes of type 1 pHTN: ALEJANDRO, anca, scleroderma antibodies   - c/w Sildenafil 20mg TID   - Plan to add Opsumit 10mg qd    - Plan for outpatient sleep study to assess RG     #Asthma   - Home meds: Advair 2 puffs BID, fluticasone 50mcg qd, Montelukast 10mg qd, Ventolin q6    Plan:  -c/w Montelukast 10mg     GASTROINTESTINAL    #Transaminitis   - AST: 141, ALT: 167 on presentation    Plan:   - No abnormalities on abdominal ultrasound   - continue to hold statin   - LFTs downtrending     RENAL  #Impaired Renal Function   - Cr trending upward in the setting of diuresis: 1.01 > 1.33    Plan:   - Lasix 20mg BID held     #Overeactive Bladder   - c/w home oxybutynin 10mg qd       INFECTIOUS DISEASE  #No active issues     ENDOCRINE  #DM  - On Metformin 500mg BID at home   - A1C: 6.3    Plan:   -ISS         HEME  #No active issues       FLUIDS/ELECTROLYTES/NUTRITION    -IVF: None   -Monitor, Replete to K>4 and Mg>2  -Diet: Dash/TLC    PROPHYLAXIS  -DVT: Heparin subQ  -GI: Protonix 40mg qd     DISPO: 7L > [facility]  CODE STATUS: Full Code  66 F with developmental disability, HTN, HLD, DM2, asthma, and ?hx of MI who presents with progressive worsening GOYAL associated with chest discomfort x1-2 weeks, admitted to cardiac telemetry. Found to have elevated PASP on ECHO, confirmed severe pre-capillary hypertension via right-heart catheterization. Pulmonary following for workup and management of pulmonary hypertension.    NEUROLOGY  # Anxiety/Depression  - c/w home Lexapro 10mg qd     CARDIOVASCULAR  #R Heart Failure  - Presents w/ progressive dyspnea and chest pain   - Left heart cath on 10/7 negative for evidence of obstructive coronary artery disease   - EKG w/ diffuse TWI in II/III/aVF/V2-V6  - Echo 10/5: normal LV size/fxn, dilated RV, mildly reduced RV systolic fxn, dilated RA, mild TR, pulmHTN (PASP 101).  - S/p Lasix 20mg IV BID   - Cardiology following     Plan:   - Hold Lasix given impaired renal function  - c/w aspirin 81mg    #Hypertension   - Home med atenolol 50mg qd, clonidine 0.1 BID, hydrochlorothiazide 25mg qd      Plan:   -c/w Clonidine 0.1 BID and Nifedipine 60mg qd  - Hold home atenolol due to bradycardia and to avoid hypotension starting Sildenafil   - Hold  hydrochlorothiazide 25mg due to impaired renal function     #Hyperlipidemia   - Home med pravastatin 10mg     Plan:   - Hold statin in the setting of transaminitis     PULMONARY  # Pulmonary Hypertension   - s/p RHC which confirms severe precapillary PH:  mean PAP 55mmHg, PAWP 10mmHg, CO/CI 3.3/1.66 by thermodilution, PVR 14 FREDERICK  - No evidence of vasoreactivity on RHC - does not meet criteria  - potential component of underlying RG/OHS given elevated BMI  - work up for etiology of pHTN ongoing, pulm suspects idiopathic PAH  - V/Q scan shows no CTEPH. CT scan shows b/l air trapping.    Plan:   - f/u PFTs   - f/u 6 min walk test   - f/u rheum w/u for underlying causes of type 1 pHTN: ALEJANDRO, anca, scleroderma antibodies, Rheumatoid Factor, antitopoisomerase antibody (SCL70), TFTs, HIV test.    - c/w Sildenafil 20mg TID   - c/w Opsumit 10mg qd    - Plan for outpatient sleep study to assess RG     #Asthma   - Home meds: Advair 2 puffs BID, fluticasone 50mcg qd, Montelukast 10mg qd, Ventolin q6    Plan:  -c/w Montelukast 10mg     GASTROINTESTINAL    #Transaminitis   - AST: 141, ALT: 167 on presentation    Plan:   - No abnormalities on abdominal ultrasound   - continue to hold statin   - LFTs downtrending     RENAL  #Impaired Renal Function   - Cr now trending down    Plan:   - Lasix 20mg BID held     #Overeactive Bladder   - c/w home oxybutynin 10mg qd       INFECTIOUS DISEASE  #No active issues     ENDOCRINE  #DM  - On Metformin 500mg BID at home   - A1C: 6.3    Plan:   -ISS     HEME  #No active issues     FLUIDS/ELECTROLYTES/NUTRITION  -IVF: None   -Monitor, Replete to K>4 and Mg>2  -Diet: Dash/TLC    PROPHYLAXIS  -DVT: Heparin subQ  -GI: Protonix 40mg qd     DISPO: 7L > [facility]  CODE STATUS: Full Code  66 F with developmental disability, HTN, HLD, DM2, asthma, and ?hx of MI who presents with progressive worsening GOYAL associated with chest discomfort x1-2 weeks, admitted to cardiac telemetry. Found to have elevated PASP on ECHO, confirmed severe pre-capillary hypertension via right-heart catheterization. Pulmonary following for workup and management of pulmonary hypertension.    NEUROLOGY  # Anxiety/Depression  - c/w home Lexapro 10mg qd     CARDIOVASCULAR  #R Heart Failure  - Presents w/ progressive dyspnea and chest pain   - Left heart cath on 10/7 negative for evidence of obstructive coronary artery disease   - EKG w/ diffuse TWI in II/III/aVF/V2-V6  - Echo 10/5: normal LV size/fxn, dilated RV, mildly reduced RV systolic fxn, dilated RA, mild TR, pulmHTN (PASP 101).  - S/p Lasix 20mg IV BID   - Cardiology following     Plan:   - Hold Lasix given impaired renal function  - c/w aspirin 81mg    #Hypertension   - Home med atenolol 50mg qd, clonidine 0.1 BID, hydrochlorothiazide 25mg qd      Plan:   -c/w Clonidine 0.1 BID and Nifedipine 60mg qd  - Hold home atenolol due to bradycardia and to avoid hypotension starting Sildenafil   - Hold hydrochlorothiazide 25mg due to impaired renal function     #Hyperlipidemia   - Home med pravastatin 10mg     Plan:   - Hold statin in the setting of transaminitis     PULMONARY  # Pulmonary Hypertension   - s/p RHC which confirms severe precapillary PH:  mean PAP 55mmHg, PAWP 10mmHg, CO/CI 3.3/1.66 by thermodilution, PVR 14 FREDERICK  - No evidence of vasoreactivity on RHC - does not meet criteria  - potential component of underlying RG/OHS given elevated BMI  - work up for etiology of pHTN ongoing, pulm suspects idiopathic PAH  - V/Q scan shows no CTEPH. CT scan shows b/l air trapping.    Plan:   - f/u PFTs   - f/u 6 min walk test   - f/u rheum w/u for underlying causes of type 1 pHTN: ALEJANDRO, anca, scleroderma antibodies, Rheumatoid Factor, antitopoisomerase antibody (SCL70), TFTs, HIV test.    - c/w Sildenafil 20mg TID   - c/w Opsumit 10mg qd    - Plan for outpatient sleep study to assess RG     #Asthma   - Home meds: Advair 2 puffs BID, fluticasone 50mcg qd, Montelukast 10mg qd, Ventolin q6    Plan:  -c/w Montelukast 10mg     GASTROINTESTINAL    #Transaminitis   - AST: 141, ALT: 167 on presentation    Plan:   - No abnormalities on abdominal ultrasound   - continue to hold statin   - LFTs downtrending     RENAL  #Impaired Renal Function   - Cr now trending down    Plan:   - Lasix 20mg BID held     #Overeactive Bladder   - c/w home oxybutynin 10mg qd       INFECTIOUS DISEASE  #No active issues     ENDOCRINE  #DM  - On Metformin 500mg BID at home   - A1C: 6.3    Plan:   -ISS     HEME  #No active issues     FLUIDS/ELECTROLYTES/NUTRITION  -IVF: None   -Monitor, Replete to K>4 and Mg>2  -Diet: Dash/TLC    PROPHYLAXIS  -DVT: Heparin subQ  -GI: Protonix 40mg qd     DISPO: 7L > [facility]  CODE STATUS: Full Code

## 2021-10-09 NOTE — PROGRESS NOTE ADULT - SUBJECTIVE AND OBJECTIVE BOX
***INCOMPLETE****  OVERNIGHT EVENTS:    SUBJECTIVE / INTERVAL HPI: Patient seen and examined at bedside.     ROS: negative unless otherwise stated above.    VITAL SIGNS:  Vital Signs Last 24 Hrs  T(C): 36.5 (09 Oct 2021 05:36), Max: 36.8 (08 Oct 2021 21:49)  T(F): 97.7 (09 Oct 2021 05:36), Max: 98.3 (08 Oct 2021 21:49)  HR: 50 (09 Oct 2021 01:14) (46 - 56)  BP: 134/61 (09 Oct 2021 01:14) (100/54 - 134/61)  BP(mean): 88 (09 Oct 2021 01:14) (71 - 88)  RR: 16 (09 Oct 2021 01:14) (16 - 19)  SpO2: 96% (09 Oct 2021 01:14) (93% - 98%)    PHYSICAL EXAM:    General: In no apparent distress  HEENT: NC/AT; PERRL, anicteric sclera; MMM  Neck: supple  Cardiovascular: +S1/S2; RRR  Respiratory: CTA B/L; no W/R/R  Gastrointestinal: soft, NT/ND; +BSx4  Extremities: WWP; no edema, clubbing or cyanosis  Vascular: 2+ radial, DP/PT pulses B/L  Neurological: AAOx3; no focal deficits    MEDICATIONS:  MEDICATIONS  (STANDING):  aspirin enteric coated 81 milliGRAM(s) Oral daily  cloNIDine 0.1 milliGRAM(s) Oral two times a day  escitalopram 10 milliGRAM(s) Oral daily  heparin   Injectable 5000 Unit(s) SubCutaneous every 8 hours  montelukast 10 milliGRAM(s) Oral daily  NIFEdipine XL 60 milliGRAM(s) Oral daily  oxybutynin 5 milliGRAM(s) Oral two times a day  pantoprazole    Tablet 40 milliGRAM(s) Oral before breakfast  sildenafil (REVATIO) 20 milliGRAM(s) Oral three times a day    MEDICATIONS  (PRN):      ALLERGIES:  Allergies    ACE inhibitors (Unknown)  Motrin (Other)    Intolerances        LABS:                        11.7   4.73  )-----------( 247      ( 08 Oct 2021 07:38 )             38.7     10-08    140  |  102  |  21  ----------------------------<  126<H>  3.7   |  27  |  1.33<H>    Ca    11.1<H>      08 Oct 2021 07:38  Mg     1.7     10-08    TPro  6.8  /  Alb  3.9  /  TBili  0.6  /  DBili  x   /  AST  27  /  ALT  80<H>  /  AlkPhos  85  10-08        CAPILLARY BLOOD GLUCOSE          RADIOLOGY & ADDITIONAL TESTS: Reviewed. HOSPITAL COURSE: 65yo F w/ developmental disability and PMHx of HTN, HLD, DM T2, and Asthma. Presented w/ GOYAL, CP, and LE edema. BNP elevated. Cardiac enzymes not elevated. EKG w/ TWI II/III/aVF, V2-V6 (no prior EKG for comparison). Pt. admitted to cardiology service for dCHF and severe pulm HTN.  Pt s/p IV Lasix (last dose 10/8 am). Pt. s/p R/LHC 10/7/21: non-obstructive CAD and pre-capillary pulm HTN; Pulm Dr. Marx following; pt started on Sildenafil 20 mg TID 10/8/21 for pulm HTN. VQ scan showed no PE. US of abd normal. non-con chest CT shows b/l air-trapping. Rheum workup underway.    OVERNIGHT EVENTS:    SUBJECTIVE / INTERVAL HPI: Patient seen and examined at bedside.     ROS: negative unless otherwise stated above.    VITAL SIGNS:  Vital Signs Last 24 Hrs  T(C): 36.5 (09 Oct 2021 05:36), Max: 36.8 (08 Oct 2021 21:49)  T(F): 97.7 (09 Oct 2021 05:36), Max: 98.3 (08 Oct 2021 21:49)  HR: 50 (09 Oct 2021 01:14) (46 - 56)  BP: 134/61 (09 Oct 2021 01:14) (100/54 - 134/61)  BP(mean): 88 (09 Oct 2021 01:14) (71 - 88)  RR: 16 (09 Oct 2021 01:14) (16 - 19)  SpO2: 96% (09 Oct 2021 01:14) (93% - 98%)    PHYSICAL EXAM:    General: In no apparent distress  HEENT: NC/AT; PERRL, anicteric sclera; MMM  Neck: supple  Cardiovascular: +S1/S2; RRR  Respiratory: CTA B/L; no W/R/R  Gastrointestinal: soft, NT/ND; +BSx4  Extremities: WWP; no edema, clubbing or cyanosis  Vascular: 2+ radial, DP/PT pulses B/L  Neurological: AAOx3; no focal deficits    MEDICATIONS:  MEDICATIONS  (STANDING):  aspirin enteric coated 81 milliGRAM(s) Oral daily  cloNIDine 0.1 milliGRAM(s) Oral two times a day  escitalopram 10 milliGRAM(s) Oral daily  heparin   Injectable 5000 Unit(s) SubCutaneous every 8 hours  montelukast 10 milliGRAM(s) Oral daily  NIFEdipine XL 60 milliGRAM(s) Oral daily  oxybutynin 5 milliGRAM(s) Oral two times a day  pantoprazole    Tablet 40 milliGRAM(s) Oral before breakfast  sildenafil (REVATIO) 20 milliGRAM(s) Oral three times a day    MEDICATIONS  (PRN):      ALLERGIES:  Allergies    ACE inhibitors (Unknown)  Motrin (Other)    Intolerances        LABS:                        11.7   4.73  )-----------( 247      ( 08 Oct 2021 07:38 )             38.7     10-08    140  |  102  |  21  ----------------------------<  126<H>  3.7   |  27  |  1.33<H>    Ca    11.1<H>      08 Oct 2021 07:38  Mg     1.7     10-08    TPro  6.8  /  Alb  3.9  /  TBili  0.6  /  DBili  x   /  AST  27  /  ALT  80<H>  /  AlkPhos  85  10-08        CAPILLARY BLOOD GLUCOSE          RADIOLOGY & ADDITIONAL TESTS: Reviewed. HOSPITAL COURSE: 65yo F w/ developmental disability and PMHx of HTN, HLD, DM T2, and Asthma. Presented w/ GOYAL, CP, and LE edema. BNP elevated. Cardiac enzymes not elevated. EKG w/ TWI II/III/aVF, V2-V6 (no prior EKG for comparison). Pt. admitted to cardiology service for dCHF and severe pulm HTN.  Pt s/p IV Lasix (last dose 10/8 am). Pt. s/p R/LHC 10/7/21: non-obstructive CAD and pre-capillary pulm HTN; Pulm Dr. Marx following; pt started on Sildenafil 20 mg TID 10/8/21 for pulm HTN. VQ scan showed no PE. US of abd normal. non-con chest CT shows b/l air-trapping. Rheum workup underway.    OVERNIGHT EVENTS:    SUBJECTIVE / INTERVAL HPI: Patient seen and examined at bedside.     ROS: negative unless otherwise stated above.    VITAL SIGNS:  Vital Signs Last 24 Hrs  T(C): 36.5 (09 Oct 2021 05:36), Max: 36.8 (08 Oct 2021 21:49)  T(F): 97.7 (09 Oct 2021 05:36), Max: 98.3 (08 Oct 2021 21:49)  HR: 50 (09 Oct 2021 01:14) (46 - 56)  BP: 134/61 (09 Oct 2021 01:14) (100/54 - 134/61)  BP(mean): 88 (09 Oct 2021 01:14) (71 - 88)  RR: 16 (09 Oct 2021 01:14) (16 - 19)  SpO2: 96% (09 Oct 2021 01:14) (93% - 98%), on 2 L NC    PHYSICAL EXAM:  General: In no apparent distress  HEENT: NC/AT; PERRL, anicteric sclera; MMM  Neck: supple  Cardiovascular: +S1/S2; RRR  Respiratory: CTA B/L; no W/R/R  Gastrointestinal: soft, NT/ND; +BSx4  Extremities: WWP; no edema, clubbing or cyanosis  Vascular: 2+ radial, DP/PT pulses B/L  Neurological: AAOx1; no focal deficits    MEDICATIONS:  MEDICATIONS  (STANDING):  aspirin enteric coated 81 milliGRAM(s) Oral daily  cloNIDine 0.1 milliGRAM(s) Oral two times a day  escitalopram 10 milliGRAM(s) Oral daily  heparin   Injectable 5000 Unit(s) SubCutaneous every 8 hours  montelukast 10 milliGRAM(s) Oral daily  NIFEdipine XL 60 milliGRAM(s) Oral daily  oxybutynin 5 milliGRAM(s) Oral two times a day  pantoprazole    Tablet 40 milliGRAM(s) Oral before breakfast  sildenafil (REVATIO) 20 milliGRAM(s) Oral three times a day    MEDICATIONS  (PRN):      ALLERGIES:  Allergies    ACE inhibitors (Unknown)  Motrin (Other)    Intolerances        LABS:                        11.7   4.73  )-----------( 247      ( 08 Oct 2021 07:38 )             38.7     10-08    140  |  102  |  21  ----------------------------<  126<H>  3.7   |  27  |  1.33<H>    Ca    11.1<H>      08 Oct 2021 07:38  Mg     1.7     10-08    TPro  6.8  /  Alb  3.9  /  TBili  0.6  /  DBili  x   /  AST  27  /  ALT  80<H>  /  AlkPhos  85  10-08        CAPILLARY BLOOD GLUCOSE          RADIOLOGY & ADDITIONAL TESTS: Reviewed. HOSPITAL COURSE: 67yo F w/ developmental disability and PMHx of HTN, HLD, DM T2, and Asthma. Presented w/ GOYAL, CP, and LE edema. BNP elevated. Cardiac enzymes not elevated. EKG w/ TWI II/III/aVF, V2-V6 (no prior EKG for comparison). Pt. admitted to cardiology service for dCHF and severe pulm HTN.  Pt s/p IV Lasix (last dose 10/8 am). Pt. s/p R/LHC 10/7/21: non-obstructive CAD and pre-capillary pulm HTN; Pulm Dr. Marx following; pt started on Sildenafil 20 mg TID 10/8/21 for pulm HTN. VQ scan showed no PE. US of abd normal. non-con chest CT shows b/l air-trapping. Rheum workup underway.    OVERNIGHT EVENTS: No events overnight.    SUBJECTIVE / INTERVAL HPI: Patient seen and examined at bedside. No complaints this morning.    ROS: negative unless otherwise stated above.    VITAL SIGNS:  Vital Signs Last 24 Hrs  T(C): 36.5 (09 Oct 2021 05:36), Max: 36.8 (08 Oct 2021 21:49)  T(F): 97.7 (09 Oct 2021 05:36), Max: 98.3 (08 Oct 2021 21:49)  HR: 50 (09 Oct 2021 01:14) (46 - 56)  BP: 134/61 (09 Oct 2021 01:14) (100/54 - 134/61)  BP(mean): 88 (09 Oct 2021 01:14) (71 - 88)  RR: 16 (09 Oct 2021 01:14) (16 - 19)  SpO2: 96% (09 Oct 2021 01:14) (93% - 98%), on 2 L NC    PHYSICAL EXAM:  General: In no apparent distress  HEENT: NC/AT; PERRL, anicteric sclera; MMM  Neck: supple  Cardiovascular: +S1/S2; RRR  Respiratory: CTA B/L; no W/R/R  Gastrointestinal: soft, NT/ND; +BSx4  Extremities: WWP; no edema, clubbing or cyanosis  Vascular: 2+ radial, DP/PT pulses B/L  Neurological: AAOx1; no focal deficits    MEDICATIONS:  MEDICATIONS  (STANDING):  aspirin enteric coated 81 milliGRAM(s) Oral daily  cloNIDine 0.1 milliGRAM(s) Oral two times a day  escitalopram 10 milliGRAM(s) Oral daily  heparin   Injectable 5000 Unit(s) SubCutaneous every 8 hours  montelukast 10 milliGRAM(s) Oral daily  NIFEdipine XL 60 milliGRAM(s) Oral daily  oxybutynin 5 milliGRAM(s) Oral two times a day  pantoprazole    Tablet 40 milliGRAM(s) Oral before breakfast  sildenafil (REVATIO) 20 milliGRAM(s) Oral three times a day    MEDICATIONS  (PRN):      ALLERGIES:  Allergies    ACE inhibitors (Unknown)  Motrin (Other)    Intolerances        LABS:                        11.7   4.73  )-----------( 247      ( 08 Oct 2021 07:38 )             38.7     10-08    140  |  102  |  21  ----------------------------<  126<H>  3.7   |  27  |  1.33<H>    Ca    11.1<H>      08 Oct 2021 07:38  Mg     1.7     10-08    TPro  6.8  /  Alb  3.9  /  TBili  0.6  /  DBili  x   /  AST  27  /  ALT  80<H>  /  AlkPhos  85  10-08        CAPILLARY BLOOD GLUCOSE          RADIOLOGY & ADDITIONAL TESTS: Reviewed.

## 2021-10-09 NOTE — PROGRESS NOTE ADULT - ATTENDING COMMENTS
Patient seen and examined with house-staff during bedside rounds.  Resident note read, including vitals, physical findings, laboratory data, and radiological reports.   Revisions included below.  Direct personal management at bed side and extensive interpretation of the data.  Plan was outlined and discussed in details with the housestaff.  Decision making of high complexity  Action taken for acute disease activity to reflect the level of care provided:  - medication reconciliation  - review laboratory data  The patient is clinically stable.  The patient is on room air not in any distress.  The patient was started on sildenafil 20 mg 3 times daily and tolerated it well.  We will start Mrs. CT scan 10 mg daily.  Patient require sleep study as an outpatient.  Increase activity.  Physical therapy evaluation.  The VQ scan was normal.

## 2021-10-10 LAB
ALBUMIN SERPL ELPH-MCNC: 3.5 G/DL — SIGNIFICANT CHANGE UP (ref 3.3–5)
ALP SERPL-CCNC: 70 U/L — SIGNIFICANT CHANGE UP (ref 40–120)
ALT FLD-CCNC: 42 U/L — SIGNIFICANT CHANGE UP (ref 10–45)
ANION GAP SERPL CALC-SCNC: 12 MMOL/L — SIGNIFICANT CHANGE UP (ref 5–17)
AST SERPL-CCNC: 17 U/L — SIGNIFICANT CHANGE UP (ref 10–40)
BASOPHILS # BLD AUTO: 0.04 K/UL — SIGNIFICANT CHANGE UP (ref 0–0.2)
BASOPHILS NFR BLD AUTO: 0.8 % — SIGNIFICANT CHANGE UP (ref 0–2)
BILIRUB SERPL-MCNC: 0.3 MG/DL — SIGNIFICANT CHANGE UP (ref 0.2–1.2)
BUN SERPL-MCNC: 19 MG/DL — SIGNIFICANT CHANGE UP (ref 7–23)
CALCIUM SERPL-MCNC: 10 MG/DL — SIGNIFICANT CHANGE UP (ref 8.4–10.5)
CHLORIDE SERPL-SCNC: 104 MMOL/L — SIGNIFICANT CHANGE UP (ref 96–108)
CO2 SERPL-SCNC: 24 MMOL/L — SIGNIFICANT CHANGE UP (ref 22–31)
CREAT SERPL-MCNC: 1.12 MG/DL — SIGNIFICANT CHANGE UP (ref 0.5–1.3)
EOSINOPHIL # BLD AUTO: 0.09 K/UL — SIGNIFICANT CHANGE UP (ref 0–0.5)
EOSINOPHIL NFR BLD AUTO: 1.9 % — SIGNIFICANT CHANGE UP (ref 0–6)
GLUCOSE BLDC GLUCOMTR-MCNC: 123 MG/DL — HIGH (ref 70–99)
GLUCOSE BLDC GLUCOMTR-MCNC: 124 MG/DL — HIGH (ref 70–99)
GLUCOSE BLDC GLUCOMTR-MCNC: 130 MG/DL — HIGH (ref 70–99)
GLUCOSE SERPL-MCNC: 126 MG/DL — HIGH (ref 70–99)
HCT VFR BLD CALC: 36.3 % — SIGNIFICANT CHANGE UP (ref 34.5–45)
HGB BLD-MCNC: 11.2 G/DL — LOW (ref 11.5–15.5)
IMM GRANULOCYTES NFR BLD AUTO: 0.4 % — SIGNIFICANT CHANGE UP (ref 0–1.5)
LYMPHOCYTES # BLD AUTO: 1.53 K/UL — SIGNIFICANT CHANGE UP (ref 1–3.3)
LYMPHOCYTES # BLD AUTO: 32.1 % — SIGNIFICANT CHANGE UP (ref 13–44)
MAGNESIUM SERPL-MCNC: 1.8 MG/DL — SIGNIFICANT CHANGE UP (ref 1.6–2.6)
MCHC RBC-ENTMCNC: 26 PG — LOW (ref 27–34)
MCHC RBC-ENTMCNC: 30.9 GM/DL — LOW (ref 32–36)
MCV RBC AUTO: 84.4 FL — SIGNIFICANT CHANGE UP (ref 80–100)
MONOCYTES # BLD AUTO: 0.48 K/UL — SIGNIFICANT CHANGE UP (ref 0–0.9)
MONOCYTES NFR BLD AUTO: 10.1 % — SIGNIFICANT CHANGE UP (ref 2–14)
NEUTROPHILS # BLD AUTO: 2.6 K/UL — SIGNIFICANT CHANGE UP (ref 1.8–7.4)
NEUTROPHILS NFR BLD AUTO: 54.7 % — SIGNIFICANT CHANGE UP (ref 43–77)
NRBC # BLD: 0 /100 WBCS — SIGNIFICANT CHANGE UP (ref 0–0)
PHOSPHATE SERPL-MCNC: 2.9 MG/DL — SIGNIFICANT CHANGE UP (ref 2.5–4.5)
PLATELET # BLD AUTO: 248 K/UL — SIGNIFICANT CHANGE UP (ref 150–400)
POTASSIUM SERPL-MCNC: 3.9 MMOL/L — SIGNIFICANT CHANGE UP (ref 3.5–5.3)
POTASSIUM SERPL-SCNC: 3.9 MMOL/L — SIGNIFICANT CHANGE UP (ref 3.5–5.3)
PROT SERPL-MCNC: 6.4 G/DL — SIGNIFICANT CHANGE UP (ref 6–8.3)
RBC # BLD: 4.3 M/UL — SIGNIFICANT CHANGE UP (ref 3.8–5.2)
RBC # FLD: 17.4 % — HIGH (ref 10.3–14.5)
SODIUM SERPL-SCNC: 140 MMOL/L — SIGNIFICANT CHANGE UP (ref 135–145)
TROPONIN T SERPL-MCNC: 0.01 NG/ML — SIGNIFICANT CHANGE UP (ref 0–0.01)
WBC # BLD: 4.76 K/UL — SIGNIFICANT CHANGE UP (ref 3.8–10.5)
WBC # FLD AUTO: 4.76 K/UL — SIGNIFICANT CHANGE UP (ref 3.8–10.5)

## 2021-10-10 PROCEDURE — 99233 SBSQ HOSP IP/OBS HIGH 50: CPT | Mod: GC

## 2021-10-10 RX ADMIN — Medication 20 MILLIGRAM(S): at 06:36

## 2021-10-10 RX ADMIN — Medication 650 MILLIGRAM(S): at 13:32

## 2021-10-10 RX ADMIN — HEPARIN SODIUM 5000 UNIT(S): 5000 INJECTION INTRAVENOUS; SUBCUTANEOUS at 12:44

## 2021-10-10 RX ADMIN — MACITENTAN 10 MILLIGRAM(S): 10 TABLET, FILM COATED ORAL at 17:15

## 2021-10-10 RX ADMIN — Medication 81 MILLIGRAM(S): at 12:43

## 2021-10-10 RX ADMIN — MONTELUKAST 10 MILLIGRAM(S): 4 TABLET, CHEWABLE ORAL at 12:43

## 2021-10-10 RX ADMIN — PANTOPRAZOLE SODIUM 40 MILLIGRAM(S): 20 TABLET, DELAYED RELEASE ORAL at 06:36

## 2021-10-10 RX ADMIN — ESCITALOPRAM OXALATE 10 MILLIGRAM(S): 10 TABLET, FILM COATED ORAL at 12:43

## 2021-10-10 RX ADMIN — Medication 0.1 MILLIGRAM(S): at 17:15

## 2021-10-10 RX ADMIN — Medication 650 MILLIGRAM(S): at 22:10

## 2021-10-10 RX ADMIN — Medication 60 MILLIGRAM(S): at 06:38

## 2021-10-10 RX ADMIN — HEPARIN SODIUM 5000 UNIT(S): 5000 INJECTION INTRAVENOUS; SUBCUTANEOUS at 06:38

## 2021-10-10 RX ADMIN — Medication 20 MILLIGRAM(S): at 12:43

## 2021-10-10 RX ADMIN — Medication 1 SPRAY(S): at 12:42

## 2021-10-10 RX ADMIN — HEPARIN SODIUM 5000 UNIT(S): 5000 INJECTION INTRAVENOUS; SUBCUTANEOUS at 22:09

## 2021-10-10 RX ADMIN — Medication 20 MILLIGRAM(S): at 22:10

## 2021-10-10 RX ADMIN — Medication 5 MILLIGRAM(S): at 06:36

## 2021-10-10 RX ADMIN — Medication 5 MILLIGRAM(S): at 17:15

## 2021-10-10 RX ADMIN — Medication 650 MILLIGRAM(S): at 12:44

## 2021-10-10 RX ADMIN — Medication 650 MILLIGRAM(S): at 23:10

## 2021-10-10 RX ADMIN — Medication 0.1 MILLIGRAM(S): at 06:35

## 2021-10-10 RX ADMIN — Medication 650 MILLIGRAM(S): at 06:34

## 2021-10-10 NOTE — PROGRESS NOTE ADULT - SUBJECTIVE AND OBJECTIVE BOX
*INCOMPLETE*   INTERVAL HPI/OVERNIGHT EVENTS:    OVERNIGHT: No overnight events. Patient remained afebrile and hemodynamically stable.     SUBJECTIVE: Patient seen and examined at bedside. Endorses back pain. States that chest pain has improved.     ROS: 12 pt ROS otherwise negative unless stated above.    OBJECTIVE:    VITAL SIGNS:  ICU Vital Signs Last 24 Hrs  T(C): 36.8 (10 Oct 2021 17:02), Max: 37.4 (09 Oct 2021 22:35)  T(F): 98.2 (10 Oct 2021 17:02), Max: 99.3 (09 Oct 2021 22:35)  HR: 72 (10 Oct 2021 17:13) (52 - 72)  BP: 126/66 (10 Oct 2021 17:13) (108/56 - 128/64)  BP(mean): 89 (10 Oct 2021 17:13) (74 - 89)  ABP: --  ABP(mean): --  RR: 18 (10 Oct 2021 17:13) (18 - 18)  SpO2: 92% (10 Oct 2021 17:13) (90% - 97%)        CAPILLARY BLOOD GLUCOSE      POCT Blood Glucose.: 123 mg/dL (10 Oct 2021 12:21)      PHYSICAL EXAM:  General: NAD, comfortable  HEENT: PERRL, clear conjunctiva, no scleral icterus  Neck: supple, no JVD  Respiratory: CTA b/l, no wheezing, rhonchi, rales  Cardiovascular: RRR, normal S1S2, no M/R/G  Vascular: 2+ radial and DP pulses  Abdomen: soft, NT/ND, bowel sounds in all four quadrants, no palpable masses  Extremities: WWP, no clubbing, cyanosis, bilateral LE edema unchanged   Skin: Eczematous rash on anterior chest wall   Neuro: A&O x3, no focal deficits     MEDICATIONS:  MEDICATIONS  (STANDING):  aspirin enteric coated 81 milliGRAM(s) Oral daily  cloNIDine 0.1 milliGRAM(s) Oral two times a day  dextrose 40% Gel 15 Gram(s) Oral once  dextrose 5%. 1000 milliLiter(s) (50 mL/Hr) IV Continuous <Continuous>  dextrose 5%. 1000 milliLiter(s) (100 mL/Hr) IV Continuous <Continuous>  dextrose 50% Injectable 25 Gram(s) IV Push once  dextrose 50% Injectable 12.5 Gram(s) IV Push once  dextrose 50% Injectable 25 Gram(s) IV Push once  escitalopram 10 milliGRAM(s) Oral daily  glucagon  Injectable 1 milliGRAM(s) IntraMuscular once  heparin   Injectable 5000 Unit(s) SubCutaneous every 8 hours  insulin lispro (ADMELOG) corrective regimen sliding scale   SubCutaneous at bedtime  macitentan 10 milliGRAM(s) Oral daily  montelukast 10 milliGRAM(s) Oral daily  NIFEdipine XL 60 milliGRAM(s) Oral daily  oxybutynin 5 milliGRAM(s) Oral two times a day  pantoprazole    Tablet 40 milliGRAM(s) Oral before breakfast  sildenafil (REVATIO) 20 milliGRAM(s) Oral three times a day    MEDICATIONS  (PRN):  acetaminophen   Tablet .. 650 milliGRAM(s) Oral every 6 hours PRN Mild Pain (1 - 3)  sodium chloride 0.65% Nasal 1 Spray(s) Both Nostrils four times a day PRN dry nose      ALLERGIES:  Allergies    ACE inhibitors (Unknown)  Motrin (Other)    Intolerances        LABS:                        11.2   4.76  )-----------( 248      ( 10 Oct 2021 06:38 )             36.3     10-10    140  |  104  |  19  ----------------------------<  126<H>  3.9   |  24  |  1.12    Ca    10.0      10 Oct 2021 06:38  Phos  2.9     10-10  Mg     1.8     10-10    TPro  6.4  /  Alb  3.5  /  TBili  0.3  /  DBili  x   /  AST  17  /  ALT  42  /  AlkPhos  70  10-10          RADIOLOGY & ADDITIONAL TESTS: Reviewed.

## 2021-10-10 NOTE — PROGRESS NOTE ADULT - ASSESSMENT
66 F with developmental disability, HTN, HLD, DM2, asthma, and ?hx of MI who presents with progressive worsening GOYAL associated with chest discomfort x1-2 weeks, admitted to cardiac telemetry. Found to have elevated PASP on ECHO, confirmed severe pre-capillary hypertension via right-heart catheterization. Pulmonary following for workup and management of pulmonary hypertension.    NEUROLOGY  # Anxiety/Depression  - c/w home Lexapro 10mg qd     CARDIOVASCULAR  #R Heart Failure  - Presents w/ progressive dyspnea and chest pain   - Left heart cath on 10/7 negative for evidence of obstructive coronary artery disease   - EKG w/ diffuse TWI in II/III/aVF/V2-V6  - Echo 10/5: normal LV size/fxn, dilated RV, mildly reduced RV systolic fxn, dilated RA, mild TR, pulmHTN (PASP 101).  - S/p Lasix 20mg IV BID   - Cardiology following     Plan:   - Hold Lasix given impaired renal function  - c/w aspirin 81mg    #Hypertension   - Home med atenolol 50mg qd, clonidine 0.1 BID, hydrochlorothiazide 25mg qd      Plan:   -c/w Clonidine 0.1 BID and Nifedipine 60mg qd  - Hold home atenolol due to bradycardia and to avoid hypotension starting Sildenafil   - Hold hydrochlorothiazide 25mg due to impaired renal function     #Hyperlipidemia   - Home med pravastatin 10mg     Plan:   - Hold statin in the setting of transaminitis     PULMONARY  # Pulmonary Hypertension   - s/p RHC which confirms severe precapillary PH:  mean PAP 55mmHg, PAWP 10mmHg, CO/CI 3.3/1.66 by thermodilution, PVR 14 FREDERICK  - No evidence of vasoreactivity on RHC - does not meet criteria  - potential component of underlying RG/OHS given elevated BMI  - work up for etiology of pHTN ongoing, pulm suspects idiopathic PAH  - V/Q scan shows no CTEPH. CT scan shows b/l air trapping.    Plan:   - f/u PFTs   - f/u 6 min walk test   - f/u rheum w/u for underlying causes of type 1 pHTN: ALEJANDRO, anca, scleroderma antibodies, Rheumatoid Factor, antitopoisomerase antibody (SCL70), TFTs, HIV test.    - c/w Sildenafil 20mg TID   - c/w Opsumit 10mg qd    - Plan for outpatient sleep study to assess RG     #Asthma   - Home meds: Advair 2 puffs BID, fluticasone 50mcg qd, Montelukast 10mg qd, Ventolin q6    Plan:  -c/w Montelukast 10mg     GASTROINTESTINAL    #Transaminitis   - AST: 141, ALT: 167 on presentation    Plan:   - No abnormalities on abdominal ultrasound   - continue to hold statin   - LFTs downtrending     RENAL  #Impaired Renal Function   - Cr now trending down    Plan:   - Lasix 20mg BID held     #Overeactive Bladder   - c/w home oxybutynin 10mg qd       INFECTIOUS DISEASE  #No active issues     ENDOCRINE  #DM  - On Metformin 500mg BID at home   - A1C: 6.3    Plan:   -ISS     HEME  #No active issues     FLUIDS/ELECTROLYTES/NUTRITION  -IVF: None   -Monitor, Replete to K>4 and Mg>2  -Diet: Dash/TLC    PROPHYLAXIS  -DVT: Heparin subQ  -GI: Protonix 40mg qd     DISPO: 7L > [facility]  CODE STATUS: Full Code

## 2021-10-10 NOTE — PROGRESS NOTE ADULT - ATTENDING COMMENTS
Patient seen and examined with house-staff during bedside rounds.  Resident note read, including vitals, physical findings, laboratory data, and radiological reports.   Revisions included below.  Direct personal management at bed side and extensive interpretation of the data.  Plan was outlined and discussed in details with the housestaff.  Decision making of high complexity  Action taken for acute disease activity to reflect the level of care provided:  - medication reconciliation  - review laboratory data  Clinically stable.  She is tolerating the sildenafil and mecitettan..  Follow-up on the liver function test.  The patient hemodynamically stable.  No swelling of the lower extremities.  Hemoglobin is stable.  Follow-up on LFTs.  Out of bed in chair.

## 2021-10-11 ENCOUNTER — TRANSCRIPTION ENCOUNTER (OUTPATIENT)
Age: 66
End: 2021-10-11

## 2021-10-11 LAB
ANION GAP SERPL CALC-SCNC: 10 MMOL/L — SIGNIFICANT CHANGE UP (ref 5–17)
BASOPHILS # BLD AUTO: 0.03 K/UL — SIGNIFICANT CHANGE UP (ref 0–0.2)
BASOPHILS NFR BLD AUTO: 0.8 % — SIGNIFICANT CHANGE UP (ref 0–2)
BUN SERPL-MCNC: 14 MG/DL — SIGNIFICANT CHANGE UP (ref 7–23)
CALCIUM SERPL-MCNC: 10.3 MG/DL — SIGNIFICANT CHANGE UP (ref 8.4–10.5)
CHLORIDE SERPL-SCNC: 105 MMOL/L — SIGNIFICANT CHANGE UP (ref 96–108)
CO2 SERPL-SCNC: 25 MMOL/L — SIGNIFICANT CHANGE UP (ref 22–31)
CREAT SERPL-MCNC: 0.86 MG/DL — SIGNIFICANT CHANGE UP (ref 0.5–1.3)
EOSINOPHIL # BLD AUTO: 0.07 K/UL — SIGNIFICANT CHANGE UP (ref 0–0.5)
EOSINOPHIL NFR BLD AUTO: 1.8 % — SIGNIFICANT CHANGE UP (ref 0–6)
GLUCOSE BLDC GLUCOMTR-MCNC: 146 MG/DL — HIGH (ref 70–99)
GLUCOSE BLDC GLUCOMTR-MCNC: 150 MG/DL — HIGH (ref 70–99)
GLUCOSE BLDC GLUCOMTR-MCNC: 177 MG/DL — HIGH (ref 70–99)
GLUCOSE SERPL-MCNC: 136 MG/DL — HIGH (ref 70–99)
HCT VFR BLD CALC: 38.1 % — SIGNIFICANT CHANGE UP (ref 34.5–45)
HGB BLD-MCNC: 11.7 G/DL — SIGNIFICANT CHANGE UP (ref 11.5–15.5)
IMM GRANULOCYTES NFR BLD AUTO: 0.3 % — SIGNIFICANT CHANGE UP (ref 0–1.5)
LYMPHOCYTES # BLD AUTO: 1.21 K/UL — SIGNIFICANT CHANGE UP (ref 1–3.3)
LYMPHOCYTES # BLD AUTO: 30.9 % — SIGNIFICANT CHANGE UP (ref 13–44)
MAGNESIUM SERPL-MCNC: 1.8 MG/DL — SIGNIFICANT CHANGE UP (ref 1.6–2.6)
MCHC RBC-ENTMCNC: 25.8 PG — LOW (ref 27–34)
MCHC RBC-ENTMCNC: 30.7 GM/DL — LOW (ref 32–36)
MCV RBC AUTO: 84.1 FL — SIGNIFICANT CHANGE UP (ref 80–100)
MONOCYTES # BLD AUTO: 0.34 K/UL — SIGNIFICANT CHANGE UP (ref 0–0.9)
MONOCYTES NFR BLD AUTO: 8.7 % — SIGNIFICANT CHANGE UP (ref 2–14)
NEUTROPHILS # BLD AUTO: 2.25 K/UL — SIGNIFICANT CHANGE UP (ref 1.8–7.4)
NEUTROPHILS NFR BLD AUTO: 57.5 % — SIGNIFICANT CHANGE UP (ref 43–77)
NRBC # BLD: 0 /100 WBCS — SIGNIFICANT CHANGE UP (ref 0–0)
PHOSPHATE SERPL-MCNC: 2.5 MG/DL — SIGNIFICANT CHANGE UP (ref 2.5–4.5)
PLATELET # BLD AUTO: 258 K/UL — SIGNIFICANT CHANGE UP (ref 150–400)
POTASSIUM SERPL-MCNC: 4 MMOL/L — SIGNIFICANT CHANGE UP (ref 3.5–5.3)
POTASSIUM SERPL-SCNC: 4 MMOL/L — SIGNIFICANT CHANGE UP (ref 3.5–5.3)
RBC # BLD: 4.53 M/UL — SIGNIFICANT CHANGE UP (ref 3.8–5.2)
RBC # FLD: 17.5 % — HIGH (ref 10.3–14.5)
SODIUM SERPL-SCNC: 140 MMOL/L — SIGNIFICANT CHANGE UP (ref 135–145)
WBC # BLD: 3.91 K/UL — SIGNIFICANT CHANGE UP (ref 3.8–10.5)
WBC # FLD AUTO: 3.91 K/UL — SIGNIFICANT CHANGE UP (ref 3.8–10.5)

## 2021-10-11 PROCEDURE — 99233 SBSQ HOSP IP/OBS HIGH 50: CPT | Mod: GC

## 2021-10-11 PROCEDURE — 99233 SBSQ HOSP IP/OBS HIGH 50: CPT | Mod: GC,25

## 2021-10-11 RX ADMIN — Medication 20 MILLIGRAM(S): at 05:55

## 2021-10-11 RX ADMIN — Medication 650 MILLIGRAM(S): at 08:57

## 2021-10-11 RX ADMIN — Medication 5 MILLIGRAM(S): at 17:55

## 2021-10-11 RX ADMIN — Medication 81 MILLIGRAM(S): at 13:05

## 2021-10-11 RX ADMIN — ESCITALOPRAM OXALATE 10 MILLIGRAM(S): 10 TABLET, FILM COATED ORAL at 13:05

## 2021-10-11 RX ADMIN — Medication 20 MILLIGRAM(S): at 21:16

## 2021-10-11 RX ADMIN — Medication 20 MILLIGRAM(S): at 13:05

## 2021-10-11 RX ADMIN — Medication 650 MILLIGRAM(S): at 16:01

## 2021-10-11 RX ADMIN — HEPARIN SODIUM 5000 UNIT(S): 5000 INJECTION INTRAVENOUS; SUBCUTANEOUS at 21:16

## 2021-10-11 RX ADMIN — Medication 0.1 MILLIGRAM(S): at 17:55

## 2021-10-11 RX ADMIN — Medication 0.1 MILLIGRAM(S): at 05:55

## 2021-10-11 RX ADMIN — HEPARIN SODIUM 5000 UNIT(S): 5000 INJECTION INTRAVENOUS; SUBCUTANEOUS at 05:53

## 2021-10-11 RX ADMIN — PANTOPRAZOLE SODIUM 40 MILLIGRAM(S): 20 TABLET, DELAYED RELEASE ORAL at 05:56

## 2021-10-11 RX ADMIN — Medication 1 SPRAY(S): at 05:55

## 2021-10-11 RX ADMIN — Medication 60 MILLIGRAM(S): at 05:55

## 2021-10-11 RX ADMIN — MONTELUKAST 10 MILLIGRAM(S): 4 TABLET, CHEWABLE ORAL at 13:05

## 2021-10-11 RX ADMIN — Medication 650 MILLIGRAM(S): at 16:56

## 2021-10-11 RX ADMIN — Medication 5 MILLIGRAM(S): at 05:56

## 2021-10-11 RX ADMIN — HEPARIN SODIUM 5000 UNIT(S): 5000 INJECTION INTRAVENOUS; SUBCUTANEOUS at 13:05

## 2021-10-11 RX ADMIN — MACITENTAN 10 MILLIGRAM(S): 10 TABLET, FILM COATED ORAL at 18:37

## 2021-10-11 NOTE — PROGRESS NOTE ADULT - ATTENDING COMMENTS
Group 1 Pulmonary HTN (RHC confirms precapillary etiology with normal PCWP). Tolerated macitentan and sildenafil well so far. Patient may be able to go back home once Pul HTN medications are approved by insurance and delivered to home. Rest as above.

## 2021-10-11 NOTE — CONSULT NOTE ADULT - ASSESSMENT
67yo woman, never smoker w/ developmental delay and PMH HTN, HLD, DM2, and asthma since childhood who presents with about 1-2 weeks of progressively worsening GOYAL a/w chest tightness, dry cough and progressively increased LE x months. Found on unofficial echo with elevated PASP for which pulmonary consulted for PH workup.     #Pulmonary hypertension  Suspected based on unofficial TTE with estimated PASP 101 torr (see chart note); formal TTE pending. No reported hx of PH or cardiomyopathies. No known rheumatologic, connective tissue, or structural lung/heart disease history that would clue into group I, III or V etiologies of PH though further workup will likely be needed and no prior CT chest imaging available. RG, if present, would be highly unlikely to cause such severe PH on its own. CTEPH may need to be ruled out eventually, pending initial work-up.     Her current presentation seems like new-onset heart failure with anterior B-line predominant pattern (though not confluent in upper fields) and trivial bilateral effusions contextualized to given hx of progressive GOYAL, orthopnea, LE edema and mildly elevated serum proBNP. Symptoms have improved after initial dose of lasix in the ED.    Recommendations:  - agree w/ trial of diuresis  - potential L+RHC tomorrow per cardiology, rule out ischemic heart dz  - f/u formal TTE  - if right heart cath is performed and patient is in heart failure, may obfuscate underlying source(s) of PH  - bronchodilators as needed; does not appear to be in acute exacerbation of asthma  - additional recommendations likely to follow after initial work-up     To be discussed with attending in AM
per Internal Medicine    66 y o F with developmental disability, HTN, HLD, DM2, asthma, and ?hx of MI who presents with progressive worsening GOYAL associated with chest discomfort x1-2 weeks, admitted to cardiac telemetry. Found to have elevated PASP on ECHO, confirmed severe pre-capillary hypertension via right-heart catheterization. Pulmonary following for workup and management of pulmonary hypertension.    NEUROLOGY  # Anxiety/Depression  - c/w home Lexapro 10mg qd     CARDIOVASCULAR  #R Heart Failure  - Presents w/ progressive dyspnea and chest pain   - Left heart cath on 10/7 negative for evidence of obstructive coronary artery disease   - EKG w/ diffuse TWI in II/III/aVF/V2-V6  - Echo 10/5: normal LV size/fxn, dilated RV, mildly reduced RV systolic fxn, dilated RA, mild TR, pulmHTN (PASP 101).  - S/p Lasix 20mg IV BID   - Cardiology following     Plan:   - Hold Lasix given impaired renal function  - c/w aspirin 81mg    #Hypertension   - Home med atenolol 50mg qd, clonidine 0.1 BID, hydrochlorothiazide 25mg qd      Plan:   -c/w Clonidine 0.1 BID and Nifedipine 60mg qd  - Hold home atenolol due to bradycardia and to avoid hypotension starting Sildenafil   - Hold hydrochlorothiazide 25mg due to impaired renal function     #Hyperlipidemia   - Home med pravastatin 10mg     Plan:   - Hold statin in the setting of transaminitis     PULMONARY  # Pulmonary Hypertension   - s/p RHC which confirms severe precapillary PH:  mean PAP 55mmHg, PAWP 10mmHg, CO/CI 3.3/1.66 by thermodilution, PVR 14 FREDERICK  - No evidence of vasoreactivity on RHC - does not meet criteria  - potential component of underlying RG/OHS given elevated BMI  - work up for etiology of pHTN ongoing, pulm suspects idiopathic PAH  - V/Q scan shows no CTEPH. CT scan shows b/l air trapping.    Plan:   - f/u PFTs   - f/u 6 min walk test   - f/u rheum w/u for underlying causes of type 1 pHTN: ALEJANDRO, anca, scleroderma antibodies, Rheumatoid Factor, antitopoisomerase antibody (SCL70), TFTs, HIV test.    - c/w Sildenafil 20mg TID   - c/w Opsumit 10mg qd    - Plan for outpatient sleep study to assess RG     #Asthma   - Home meds: Advair 2 puffs BID, fluticasone 50mcg qd, Montelukast 10mg qd, Ventolin q6    Plan:  -c/w Montelukast 10mg     GASTROINTESTINAL    #Transaminitis   - AST: 141, ALT: 167 on presentation    Plan:   - No abnormalities on abdominal ultrasound   - continue to hold statin   - LFTs downtrending     RENAL  #Impaired Renal Function   - Cr now trending down    Plan:   - Lasix 20mg BID held     #Overeactive Bladder   - c/w home oxybutynin 10mg qd       INFECTIOUS DISEASE  #No active issues     ENDOCRINE  #DM  - On Metformin 500mg BID at home   - A1C: 6.3    Plan:   -ISS     HEME  #No active issues     FLUIDS/ELECTROLYTES/NUTRITION  -IVF: None   -Monitor, Replete to K>4 and Mg>2  -Diet: Dash/TLC    PROPHYLAXIS  -DVT: Heparin subQ  -GI: Protonix 40mg qd

## 2021-10-11 NOTE — DISCHARGE NOTE PROVIDER - NSDCCPCAREPLAN_GEN_ALL_CORE_FT
PRINCIPAL DISCHARGE DIAGNOSIS  Diagnosis: Pulmonary hypertension  Assessment and Plan of Treatment: Patient may resume activity in day treatment program.       PRINCIPAL DISCHARGE DIAGNOSIS  Diagnosis: Pulmonary hypertension  Assessment and Plan of Treatment: Pulmonary hypertension is occurs when the pressure in the pulmonary arteries is elevated.   - Patients with PAH can develop worsening shortness of breath with exertion. Monitor how much physical activity you are able to do on a daily basis  - Let your doctor know if you develop increased difficulty breathing, swelling in her abdomen or legs/feet, nausea, fast heart rate, or dizziness  - Daily exercise is recommended, but if you develop severe shortness of breath, dizziness, or feeling like you are going to pass out, sit immediately and seek medical advice. Cardiovascular exercises such as walking, biking, swimming, aerobic activity is helpful. Avoid any heavy lifting (>10 lbs), squatting or bending during exercise.   - Check daily weights. Weight can fluctuate up and down normally 1-3 lbs on a day to day basis. If you have an increase of weight by > 3lbs in one day or you notice an increase in weight multiple days in a row, please call the office as you may be developing worsening edema.   - Low salt diet is required as increased salt can lead to increased water retention.   You were evaluated for the cause of your pHTN and started on Sildenafil and Opsumit for treatment. Take 20mg Sildenafil three times a day. Take Opsumit 10mg daily.   Medication side effects  - Headaches, flushing, nausea, leg pain, leg swelling, and nasal congestion can occur  - Tylenol is helpful for aches/pains and is safe to use in PAH. Nasal sprays can help with congestion. Patients may require diuretics (water pill) to help with any increasing leg swelling while on the medication  - Opsumit CANNOT be crushed. Must be taken whole. Teratogenic effects noted also with crushing of medication by medication handlers.    Patient may resume activity in day treatment program.        SECONDARY DISCHARGE DIAGNOSES  Diagnosis: Hypertension  Assessment and Plan of Treatment: You were previously diagnosed with high blood pressure. We discontinued your Atenolol because your heart rate was low, and this medication could potentially slow it down further.   Continue  Clonidine 0.1mg twice daily, Nifedipine 60mg daily, hydrochlorothiazide 25mg daily.   Follow up with your primary care physician for further surveillance and management of your high blood pressure.      Diagnosis: HLD (hyperlipidemia)  Assessment and Plan of Treatment: You were previously diagnosed with high cholesterol. Your home Pravastatin was discontinued becasue it causing irritation to your liver.    Please follow up with your primary care physician for further surveillance and management of your high cholesterol and to monitor your liver function.        PRINCIPAL DISCHARGE DIAGNOSIS  Diagnosis: Pulmonary hypertension  Assessment and Plan of Treatment: Pulmonary hypertension is occurs when the pressure in the pulmonary arteries is elevated.   - Patients with PAH can develop worsening shortness of breath with exertion. Monitor how much physical activity you are able to do on a daily basis  - Let your doctor know if you develop increased difficulty breathing, swelling in her abdomen or legs/feet, nausea, fast heart rate, or dizziness  - Daily exercise is recommended, but if you develop severe shortness of breath, dizziness, or feeling like you are going to pass out, sit immediately and seek medical advice. Cardiovascular exercises such as walking, biking, swimming, aerobic activity is helpful. Avoid any heavy lifting (>10 lbs), squatting or bending during exercise.   - Check daily weights. Weight can fluctuate up and down normally 1-3 lbs on a day to day basis. If you have an increase of weight by > 3lbs in one day or you notice an increase in weight multiple days in a row, please call the office as you may be developing worsening edema.   - Low salt diet is required as increased salt can lead to increased water retention.   You were evaluated for the cause of your pHTN and started on Sildenafil and Opsumit for treatment. Take 20mg Sildenafil three times a day. Take Opsumit 10mg daily.   Medication side effects  - Headaches, flushing, nausea, leg pain, leg swelling, and nasal congestion can occur  - Tylenol is helpful for aches/pains and is safe to use in PAH. Nasal sprays can help with congestion. Patients may require diuretics (water pill) to help with any increasing leg swelling while on the medication  - Opsumit CANNOT be crushed. Must be taken whole. Teratogenic effects noted also with crushing of medication by medication handlers.    Patient may resume activity in day treatment program.  Please call to schedule an appointment with Dr. Shaw at the pulmonary health clinic.         SECONDARY DISCHARGE DIAGNOSES  Diagnosis: Hypertension  Assessment and Plan of Treatment: You were previously diagnosed with high blood pressure. We discontinued your Atenolol because your heart rate was low, and this medication could potentially slow it down further.   Continue  Clonidine 0.1mg twice daily, Nifedipine 60mg daily, hydrochlorothiazide 25mg daily.   Schedule an appointment with your primary care physician in 2 weeks for further surveillance and management of your high blood pressure.      Diagnosis: HLD (hyperlipidemia)  Assessment and Plan of Treatment: You were previously diagnosed with high cholesterol. Your home Pravastatin was discontinued becasue it causing irritation to your liver.   Schedule an appointment with your primary care physician for further surveillance and management of your high cholesterol and to monitor your liver function.

## 2021-10-11 NOTE — DISCHARGE NOTE PROVIDER - HOSPITAL COURSE
Patient may resume activity in day treatment program. Patient is 67 y/o     Hospital course (by problem):     Patient was discharged to: (home/[facility]/acute rehab/hospice, etc, and with what services – home health PT/RN? Home O2?)    New medications:   Changes to old medications:  Medications that were stopped:    Items to follow up as outpatient:    Physical exam at the time of discharge:          Patient may resume activity in day treatment program. 66 F with developmental disability, HTN, HLD, DM2, asthma, and CAD who presents with progressive worsening GOYAL associated with chest discomfort x1-2 weeks, found to have elevated PASP on ECHO, confirmed severe pre-capillary hypertension via right-heart catheterization. Pt admitted for workup and management of pulmonary hypertension.    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   - Patient may resume activity in day treatment program.     Plan: Patient will follow up w/  in pulmonary health clinic in 2-3 weeks. Continue w/ Sildenafil 20mg TID and Opsumit 10mg PO.    Problem 2: Hypertension   - Home med atenolol 50mg qd, clonidine 0.1 BID, hydrochlorothiazide 25mg qd    - C/w Clonidine 0.1 BID and Nifedipine 60mg qd  - Atenolol 50mg qd held due to bradycardia  - Hydrochlorothiazide 25mg qd held due to impaired renal function in the setting diuresis    Plan: Discontinue atenolol 50mg qd in the setting of bradycardia. Continue Clonidine 0.1 BID, Nifedipine 60mg qd, hydrochlorothiazide 25mg daily. Follow up w/ PCP regarding management of HTN.     Problem 3: Hyperlipidemia   - On pravastatin 10mg for hyperlipidemia   - Pravastatin discontinued in the setting of transaminitis, LFTs since improved.     Plan: Continue to hold pravastatin 10mg due to transaminitis. Follow up w/ PCP regarding management of HLD.     Problem 4: Asthma   -  Home meds: Advair 2 puffs BID, fluticasone 50mcg qd, Montelukast 10mg qd, Ventolin q6  - Treated w/ Montelukast 10mg qd inpatient     Plan: Resume home asthma medications       Patient was discharged to: Nursing Home     New medications: Sildenafil 20mg TID, Opsumit 10mg qd   Changes to old medications: N/A   Medications that were stopped: Atenolol, Pravastatin     Items to follow up as outpatient: Follow- up w/ pulmonology for management of pulmonary hypertension. Follow up w/ primary care physician regarding management of HTN and HLD.       PHYSICAL EXAM:    Constitutional: NAD, awake and alert, well-developed  HEENT: PERR, EOMI, Normal Hearing, MMM  Neck: Soft and supple, No LAD, No JVD  Respiratory: Breath sounds are clear bilaterally, No wheezing, rales or rhonchi  Cardiovascular: S1 and S2, regular rate and rhythm, no Murmurs, gallops or rubs  Gastrointestinal: Bowel Sounds present, soft, nontender, nondistended, no guarding, no rebound  Extremities: Bilateral non-pitting LE edema   Vascular: 2+ peripheral pulses  Neurological: A/O x 3, no focal deficits  Musculoskeletal: 5/5 strength b/l upper and lower extremities  Skin: Eczematous rash on anterior chest wall

## 2021-10-11 NOTE — PROGRESS NOTE ADULT - ATTENDING COMMENTS
Pt seen and examined at bedside, agree with above. 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 Complete rheumatological studies pending, but otherwise no manifestations of rheumatologic condition, US abdomen without evidence of cirrhosis or portal hypertension, V/Q negative for CTEPH, HIV negative, LHC without CAD and TTE with normal LV size/function, no valvular disease. CT chest with mosaicism, otherwise no fibrosis or significant parenchymal disease to explain such severe PH. Will need to obtain out patient PFTs and six min walk test. Started on dual oral therapy, sildenafil and macitentan, awaiting confirmation from specialty pharmacy that patient has been approved with plan to d/c home after approval. Please have patient follow up in the PH clinic with myself in 2-3 weeks from discharge, office # [phone].    Discharge instructions for group home:    Symptom tracker  - Patients with PAH can develop worsening shortness of breath with exertion. Monitor how much physical activity you are able to do on a daily basis  - Let your doctor know if you develop increased difficulty breathing, swelling in her abdomen or legs/feet, nausea, fast heart rate, or dizziness  - Daily exercise is recommended, but if you develop severe shortness of breath, dizziness, or feeling like you are going to pass out, sit immediately and seek medical advice. Cardiovascular exercises such as walking, biking, swimming, aerobic activity is helpful. Avoid any heavy lifting (>10 lbs), squatting or bending during exercise.   - Check daily weights. Weight can fluctuate up and down normally 1-3 lbs on a day to day basis. If you have an increase of weight by > 3lbs in one day or you notice an increase in weight multiple days in a row, please call the office as you may be developing worsening edema.   - Low salt diet is required as increased salt can lead to increased water retention.   - PAH education documents faxed to patient's home.     Medication side effects  - Headaches, flushing, nausea, leg pain, leg swelling, and nasal congestion can occur  - Tylenol is helpful for aches/pains and is safe to use in PAH. Nasal sprays can help with congestion. Patients may require diuretics (water pill) to help with any increasing leg swelling while on the medication  - Opsumit CANNOT be crushed. Must be taken whole. Teratogenic effects noted also with crushing of medication by medication handlers.

## 2021-10-11 NOTE — DISCHARGE NOTE PROVIDER - PROVIDER TOKENS
PROVIDER:[TOKEN:[[id number]],FOLLOWUP:[2 weeks],ESTABLISHEDPATIENT:[T]],PROVIDER:[TOKEN:[[id number]],FOLLOWUP:[2 weeks]]

## 2021-10-11 NOTE — PROGRESS NOTE ADULT - SUBJECTIVE AND OBJECTIVE BOX
PULMONARY CONSULT SERVICE FOLLOW-UP NOTE    INTERVAL HPI:  Reviewed chart and overnight events; patient seen and examined at bedside. Patient feeling better this morning resting comfortably on room air at time of examination. States that she was able to get up with PT and walk up and down the ly with improvement in her breathing from prior. Otherwise no further events overnight    MEDICATIONS:  Pulmonary:  montelukast 10 milliGRAM(s) Oral daily    Antimicrobials:    Anticoagulants:  aspirin enteric coated 81 milliGRAM(s) Oral daily  heparin   Injectable 5000 Unit(s) SubCutaneous every 8 hours    Cardiac:  cloNIDine 0.1 milliGRAM(s) Oral two times a day  macitentan 10 milliGRAM(s) Oral daily  NIFEdipine XL 60 milliGRAM(s) Oral daily  sildenafil (REVATIO) 20 milliGRAM(s) Oral three times a day      Allergies    ACE inhibitors (Unknown)  Motrin (Other)    Intolerances        Vital Signs Last 24 Hrs  T(C): 36.7 (11 Oct 2021 05:04), Max: 36.9 (10 Oct 2021 21:37)  T(F): 98.1 (11 Oct 2021 05:04), Max: 98.4 (10 Oct 2021 21:37)  HR: 58 (11 Oct 2021 08:29) (58 - 72)  BP: 120/65 (11 Oct 2021 08:29) (118/62 - 134/60)  BP(mean): 87 (11 Oct 2021 08:29) (81 - 89)  RR: 18 (11 Oct 2021 08:29) (18 - 18)  SpO2: 95% (11 Oct 2021 08:29) (89% - 96%)    10-10 @ 07:01  -  10-11 @ 07:00  --------------------------------------------------------  IN: 0 mL / OUT: 200 mL / NET: -200 mL          PHYSICAL EXAM:  Constitutional: WDWN  HEENT: NC/AT; PERRL, anicteric sclera; MMM  Neck: supple  Cardiovascular: +S1/S2, RRR  Respiratory: CTA B/L; no W/R/R  Gastrointestinal: soft, NT/ND  Extremities: WWP; no edema, clubbing or cyanosis  Vascular: 2+ radial pulses B/L  Neurological: awake and alert; ENRIQUEZ    LABS:      CBC Full  -  ( 11 Oct 2021 06:16 )  WBC Count : 3.91 K/uL  RBC Count : 4.53 M/uL  Hemoglobin : 11.7 g/dL  Hematocrit : 38.1 %  Platelet Count - Automated : 258 K/uL  Mean Cell Volume : 84.1 fl  Mean Cell Hemoglobin : 25.8 pg  Mean Cell Hemoglobin Concentration : 30.7 gm/dL  Auto Neutrophil # : 2.25 K/uL  Auto Lymphocyte # : 1.21 K/uL  Auto Monocyte # : 0.34 K/uL  Auto Eosinophil # : 0.07 K/uL  Auto Basophil # : 0.03 K/uL  Auto Neutrophil % : 57.5 %  Auto Lymphocyte % : 30.9 %  Auto Monocyte % : 8.7 %  Auto Eosinophil % : 1.8 %  Auto Basophil % : 0.8 %    10-11    140  |  105  |  14  ----------------------------<  136<H>  4.0   |  25  |  0.86    Ca    10.3      11 Oct 2021 06:15  Phos  2.5     10-11  Mg     1.8     10-11    TPro  6.4  /  Alb  3.5  /  TBili  0.3  /  DBili  x   /  AST  17  /  ALT  42  /  AlkPhos  70  10-10                      RADIOLOGY & ADDITIONAL STUDIES:

## 2021-10-11 NOTE — PROGRESS NOTE ADULT - ASSESSMENT
66 F with developmental delay, HTN, HLD, DM2, asthma, and ?hx of MI who presents with progressive worsening GOYAL associated with chest discomfort x1-2 weeks. Found to have elevated PASP on ECHO, confirmed severe pre-capillary hypertension via right-heart catheterization. Pulmonary following for workup and management of pulmonary hypertension.    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 Left heart cath was done at same time that showed no signs of obstructive disease and wedge was 7, ruling out WHO group 2. Patient is still at risk given obesity for group 3 caused by OHS/RG, however bicarb levels are within normal limits making OHS less likely. Patient will need home sleep study to evaluate RG contributing. CT of the chest shows diffuse mosaic attenuation in expiration, indicating air trapping likely present but no inspiratory imaging to compare to. Does have dilated airways bilaterally as well making airtrapping more likely in the setting of bronchiectasis. HP is a consideration but there are no signs of fibrosis that could explain patients pulmonary HTN. V/Q scan was negative ruling our group 4 CTEPH. Patient has no family hx of rheumatologic disease and RF negative as well as scleroderma. Will need ALEJANDRO, dsDNA, anti CCP, and anti-RNP to complete rheumatology eval for group 1 work up. HIV negative as well as UDS. US of abdomen shows no signs of portal htn as well. Because of likely primary group 1 disease patient was started on sildenafil 20mg TID and macitentan 10mg daily. This seems to have improved patient functional ability as she is off oxygen and able to walk with less if any SOB.       Recommendations:  -Continue sildenafil 20mg TID with LFT trending to monitor liver function  -Continue macitentan 10mg PO daily   -PFTs, and 6 minute-walk test  -Pt will need home sleep study to assess for RG       Plan discussed with attending Dr. Shaw and primary team. 66 F with developmental delay, HTN, HLD, DM2, asthma, and ?hx of MI who presents with progressive worsening GOYAL associated with chest discomfort x1-2 weeks. Found to have elevated PASP on ECHO, confirmed severe pre-capillary hypertension via right-heart catheterization. Pulmonary following for workup and management of pulmonary hypertension.    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 Left heart cath was done at same time that showed no signs of obstructive disease and wedge was 7, ruling out WHO group 2. Patient is still at risk given obesity for group 3 caused by OHS/RG, however bicarb levels are within normal limits making OHS less likely. Patient will need home sleep study to evaluate RG contributing. CT of the chest shows diffuse mosaic attenuation in expiration, indicating air trapping likely present but no inspiratory imaging to compare to. Does have dilated airways bilaterally as well making airtrapping more likely in the setting of bronchiectasis. HP is a consideration but there are no signs of fibrosis that could explain patients pulmonary HTN. V/Q scan was negative ruling our group 4 CTEPH. Patient has no family hx of rheumatologic disease and RF negative as well as scleroderma. Will need ALEJANDRO, dsDNA, anti CCP, and anti-RNP to complete rheumatology eval for group 1 work up. HIV negative as well as UDS. US of abdomen shows no signs of portal htn as well. Because of likely primary group 1 disease patient was started on sildenafil 20mg TID and macitentan 10mg daily. This seems to have improved patient functional ability as she is off oxygen and able to walk with less if any SOB.       Recommendations:  -Continue sildenafil 20mg TID with LFT trending to monitor liver function  -Continue macitentan 10mg PO daily   -Will plan to DC once patient is able to get sildenafil and macitentan at home facility  -PFTs, and 6 minute-walk test  -Pt will need home sleep study to assess for RG       Plan discussed with attending Dr. Shaw and primary team. 66 F with developmental delay, HTN, HLD, DM2, asthma, and ?hx of MI who presents with progressive worsening GOYAL associated with chest discomfort x1-2 weeks. Found to have elevated PASP on ECHO, confirmed severe pre-capillary hypertension via right-heart catheterization. Pulmonary following for workup and management of pulmonary hypertension.    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 Left heart cath was done at same time that showed no signs of obstructive disease and wedge was 7, ruling out WHO group 2. Patient is still at risk given obesity for group 3 caused by OHS/RG, however bicarb levels are within normal limits making OHS less likely. Patient will need home sleep study to evaluate RG contributing. CT of the chest shows diffuse mosaic attenuation in expiration, indicating air trapping likely present but no inspiratory imaging to compare to. Does have dilated airways bilaterally as well making airtrapping more likely in the setting of bronchiectasis. HP is a consideration but there are no signs of fibrosis that could explain patients pulmonary HTN. V/Q scan was negative ruling our group 4 CTEPH. Patient has no family hx of rheumatologic disease and RF negative as well as scleroderma. Will need ALEJANDRO, dsDNA, anti CCP, and anti-RNP to complete rheumatology eval for group 1 work up. HIV negative as well as UDS. US of abdomen shows no signs of portal htn as well. Because of likely primary group 1 disease patient was started on sildenafil 20mg TID and macitentan 10mg daily. This seems to have improved patient functional ability as she is off oxygen and able to walk with less if any SOB.       Recommendations:  -Continue sildenafil 20mg TID   -Continue macitentan 10mg PO daily with LFT trending to monitor liver function and CBC to monitor hgb  -Will plan to DC once patient is able to get sildenafil and macitentan at home facility  -PFTs, and 6 minute-walk test  -Pt will need home sleep study to assess for RG       Plan discussed with attending Dr. Shaw and primary team.

## 2021-10-11 NOTE — PROGRESS NOTE ADULT - ASSESSMENT
66 F with developmental disability, HTN, HLD, DM2, asthma, and ?hx of MI who presents with progressive worsening GOYAL associated with chest discomfort x1-2 weeks, admitted to cardiac telemetry. Found to have elevated PASP on ECHO, confirmed severe pre-capillary hypertension via right-heart catheterization. Pulmonary following for workup and management of pulmonary hypertension.    NEUROLOGY  # Anxiety/Depression  - c/w home Lexapro 10mg qd     CARDIOVASCULAR  #R Heart Failure  - Presents w/ progressive dyspnea and chest pain   - Left heart cath on 10/7 negative for evidence of obstructive coronary artery disease   - EKG w/ diffuse TWI in II/III/aVF/V2-V6  - Echo 10/5: normal LV size/fxn, dilated RV, mildly reduced RV systolic fxn, dilated RA, mild TR, pulmHTN (PASP 101).  - S/p Lasix 20mg IV BID   - Cardiology following     Plan:   - Hold Lasix given impaired renal function  - c/w aspirin 81mg    #Hypertension   - Home med atenolol 50mg qd, clonidine 0.1 BID, hydrochlorothiazide 25mg qd      Plan:   -c/w Clonidine 0.1 BID and Nifedipine 60mg qd  - Hold home atenolol due to bradycardia and to avoid hypotension starting Sildenafil   - Hold hydrochlorothiazide 25mg due to impaired renal function     #Hyperlipidemia   - Home med pravastatin 10mg     Plan:   - Hold statin in the setting of transaminitis     PULMONARY  # Pulmonary Hypertension   - s/p RHC which confirms severe precapillary PH:  mean PAP 55mmHg, PAWP 10mmHg, CO/CI 3.3/1.66 by thermodilution, PVR 14 FREDERICK  - No evidence of vasoreactivity on RHC - does not meet criteria  - potential component of underlying RG/OHS given elevated BMI  - work up for etiology of pHTN ongoing, pulm suspects idiopathic PAH  - V/Q scan shows no CTEPH. CT scan shows b/l air trapping.    Plan:   - f/u PFTs   - f/u 6 min walk test   - f/u rheum w/u for underlying causes of type 1 pHTN: ALEJANDRO, anca, scleroderma antibodies, Rheumatoid Factor, antitopoisomerase antibody (SCL70), TFTs, HIV test.    - c/w Sildenafil 20mg TID   - c/w Opsumit 10mg qd    - Plan for outpatient sleep study to assess RG     #Asthma   - Home meds: Advair 2 puffs BID, fluticasone 50mcg qd, Montelukast 10mg qd, Ventolin q6    Plan:  -c/w Montelukast 10mg     GASTROINTESTINAL    #Transaminitis   - AST: 141, ALT: 167 on presentation    Plan:   - No abnormalities on abdominal ultrasound   - continue to hold statin   - LFTs downtrending     RENAL  #Impaired Renal Function   - Cr now trending down    Plan:   - Lasix 20mg BID held     #Overeactive Bladder   - c/w home oxybutynin 10mg qd       INFECTIOUS DISEASE  #No active issues     ENDOCRINE  #DM  - On Metformin 500mg BID at home   - A1C: 6.3    Plan:   -ISS     HEME  #No active issues     FLUIDS/ELECTROLYTES/NUTRITION  -IVF: None   -Monitor, Replete to K>4 and Mg>2  -Diet: Dash/TLC    PROPHYLAXIS  -DVT: Heparin subQ  -GI: Protonix 40mg qd     DISPO: 7L > [facility]  CODE STATUS: Full Code  66 F with developmental disability, HTN, HLD, DM2, asthma, and ?hx of MI who presents with progressive worsening GOYAL associated with chest discomfort x1-2 weeks, admitted to cardiac telemetry. Found to have elevated PASP on ECHO, confirmed severe pre-capillary hypertension via right-heart catheterization. Pulmonary following for workup and management of pulmonary hypertension.    NEUROLOGY  # Anxiety/Depression  - c/w home Lexapro 10mg qd     CARDIOVASCULAR  #R Heart Failure  - Presents w/ progressive dyspnea and chest pain   - Left heart cath on 10/7 negative for evidence of obstructive coronary artery disease   - EKG w/ diffuse TWI in II/III/aVF/V2-V6  - Echo 10/5: normal LV size/fxn, dilated RV, mildly reduced RV systolic fxn, dilated RA, mild TR, pulmHTN (PASP 101).  - S/p Lasix 20mg IV BID   - Cardiology following     Plan:   - Lasix discontinued in the setting of impaired renal function. No further diuresis needed.   - c/w aspirin 81mg    #Hypertension   - Home med atenolol 50mg qd, clonidine 0.1 BID, hydrochlorothiazide 25mg qd      Plan:   -c/w Clonidine 0.1 BID and Nifedipine 60mg qd  - Hold home atenolol due to bradycardia and to avoid hypotension starting Sildenafil   - Hydrochlorothiazide 25mg held due to impaired renal function.       #Hyperlipidemia   - Home med pravastatin 10mg     Plan:   - Statin discontinued in the setting of transaminitis     PULMONARY    # Pulmonary Hypertension   - s/p RHC which confirms severe precapillary PH:  mean PAP 55mmHg, PAWP 10mmHg, CO/CI 3.3/1.66 by thermodilution, PVR 14 FREDERICK  - No evidence of vasoreactivity on RHC - does not meet criteria  - potential component of underlying RG/OHS given elevated BMI  - work up for etiology of pHTN ongoing, pulm suspects idiopathic PAH  - V/Q scan shows no CTEPH. CT scan shows b/l air trapping.    Plan:   - f/u rheum w/u for underlying causes of type 1 pHTN: ALEJANDRO, anca, scleroderma antibodies, Rheumatoid Factor, antitopoisomerase antibody (SCL70), TFTs, HIV test.    Plan:   - f/u PFTs, 6 min walk test, sleep study outpatient   - c/w Sildenafil 20mg TID   - c/w Opsumit 10mg qd    - Plan for outpatient sleep study to assess RG     #Asthma   - Home meds: Advair 2 puffs BID, fluticasone 50mcg qd, Montelukast 10mg qd, Ventolin q6    Plan:  -c/w Montelukast 10mg     GASTROINTESTINAL    #Transaminitis   - AST: 141, ALT: 167 on presentation    Plan:   - No abnormalities on abdominal ultrasound   - continue to hold statin   - LFTs downtrending     RENAL  #No active issues     #Overeactive Bladder   - c/w home oxybutynin 10mg qd       INFECTIOUS DISEASE  #No active issues     ENDOCRINE  #DM  - On Metformin 500mg BID at home   - A1C: 6.3    Plan:   -ISS     HEME  #No active issues     FLUIDS/ELECTROLYTES/NUTRITION  -IVF: None   -Monitor, Replete to K>4 and Mg>2  -Diet: Dash/TLC    PROPHYLAXIS  -DVT: Heparin subQ  -GI: Protonix 40mg qd     DISPO: 7L > [facility]  CODE STATUS: Full Code

## 2021-10-11 NOTE — PROGRESS NOTE ADULT - SUBJECTIVE AND OBJECTIVE BOX
**INCOMPLETE NOTE    INTERVAL HPI/OVERNIGHT EVENTS:    OVERNIGHT: No overnight events.  SUBJECTIVE: Patient seen and examined at bedside.     ROS:  CV: Denies chest pain  Resp: Denies SOB  GI: Denies abdominal pain, constipation, diarrhea, nausea, vomiting  : Denies dysuria  ID: Denies fevers, chills  MSK: Denies joint pain     OBJECTIVE:    VITAL SIGNS:  ICU Vital Signs Last 24 Hrs  T(C): 36.7 (11 Oct 2021 05:04), Max: 36.9 (10 Oct 2021 21:37)  T(F): 98.1 (11 Oct 2021 05:04), Max: 98.4 (10 Oct 2021 21:37)  HR: 58 (11 Oct 2021 04:06) (54 - 72)  BP: 120/63 (11 Oct 2021 04:06) (108/56 - 134/60)  BP(mean): 84 (11 Oct 2021 04:06) (74 - 89)  ABP: --  ABP(mean): --  RR: 18 (11 Oct 2021 04:06) (18 - 18)  SpO2: 95% (11 Oct 2021 04:06) (89% - 96%)        CAPILLARY BLOOD GLUCOSE      POCT Blood Glucose.: 130 mg/dL (10 Oct 2021 21:57)      PHYSICAL EXAM:  General: NAD, comfortable  HEENT: NCAT, PERRL, clear conjunctiva, no scleral icterus  Neck: supple, no JVD  Respiratory: CTA b/l, no wheezing, rhonchi, rales  Cardiovascular: RRR, normal S1S2, no M/R/G  Vascular: 2+ radial and DP pulses  Abdomen: soft, NT/ND, bowel sounds in all four quadrants, no palpable masses  Extremities: WWP, no clubbing, cyanosis, or edema  Skin: No rashes present  Neuro:     MEDICATIONS:  MEDICATIONS  (STANDING):  aspirin enteric coated 81 milliGRAM(s) Oral daily  cloNIDine 0.1 milliGRAM(s) Oral two times a day  dextrose 40% Gel 15 Gram(s) Oral once  dextrose 5%. 1000 milliLiter(s) (50 mL/Hr) IV Continuous <Continuous>  dextrose 5%. 1000 milliLiter(s) (100 mL/Hr) IV Continuous <Continuous>  dextrose 50% Injectable 25 Gram(s) IV Push once  dextrose 50% Injectable 12.5 Gram(s) IV Push once  dextrose 50% Injectable 25 Gram(s) IV Push once  escitalopram 10 milliGRAM(s) Oral daily  glucagon  Injectable 1 milliGRAM(s) IntraMuscular once  heparin   Injectable 5000 Unit(s) SubCutaneous every 8 hours  insulin lispro (ADMELOG) corrective regimen sliding scale   SubCutaneous at bedtime  macitentan 10 milliGRAM(s) Oral daily  montelukast 10 milliGRAM(s) Oral daily  NIFEdipine XL 60 milliGRAM(s) Oral daily  oxybutynin 5 milliGRAM(s) Oral two times a day  pantoprazole    Tablet 40 milliGRAM(s) Oral before breakfast  sildenafil (REVATIO) 20 milliGRAM(s) Oral three times a day    MEDICATIONS  (PRN):  acetaminophen   Tablet .. 650 milliGRAM(s) Oral every 6 hours PRN Mild Pain (1 - 3)  sodium chloride 0.65% Nasal 1 Spray(s) Both Nostrils four times a day PRN dry nose      ALLERGIES:  Allergies    ACE inhibitors (Unknown)  Motrin (Other)    Intolerances        LABS:                        11.2   4.76  )-----------( 248      ( 10 Oct 2021 06:38 )             36.3     10-10    140  |  104  |  19  ----------------------------<  126<H>  3.9   |  24  |  1.12    Ca    10.0      10 Oct 2021 06:38  Phos  2.9     10-10  Mg     1.8     10-10    TPro  6.4  /  Alb  3.5  /  TBili  0.3  /  DBili  x   /  AST  17  /  ALT  42  /  AlkPhos  70  10-10          RADIOLOGY & ADDITIONAL TESTS: Reviewed.     INTERVAL HPI/OVERNIGHT EVENTS:    OVERNIGHT: No overnight. Pt remained afebrile and hemodynamically stable.   SUBJECTIVE: Patient seen and examined at bedside. Sleeping comfortable on RA.     ROS: 12 pt ROS otherwise negative unless states above.     OBJECTIVE:    VITAL SIGNS:  ICU Vital Signs Last 24 Hrs  T(C): 36.7 (11 Oct 2021 05:04), Max: 36.9 (10 Oct 2021 21:37)  T(F): 98.1 (11 Oct 2021 05:04), Max: 98.4 (10 Oct 2021 21:37)  HR: 58 (11 Oct 2021 04:06) (54 - 72)  BP: 120/63 (11 Oct 2021 04:06) (108/56 - 134/60)  BP(mean): 84 (11 Oct 2021 04:06) (74 - 89)  ABP: --  ABP(mean): --  RR: 18 (11 Oct 2021 04:06) (18 - 18)  SpO2: 95% (11 Oct 2021 04:06) (89% - 96%)        CAPILLARY BLOOD GLUCOSE      POCT Blood Glucose.: 130 mg/dL (10 Oct 2021 21:57)      PHYSICAL EXAM:  General: NAD, comfortable  HEENT: PERRL, clear conjunctiva, no scleral icterus  Neck: supple, no JVD  Respiratory: CTA b/l, no wheezing, rhonchi, rales  Cardiovascular: RRR, normal S1S2, no M/R/G  Vascular: 2+ radial and DP pulses  Abdomen: soft, NT/ND, bowel sounds in all four quadrants, no palpable masses  Extremities: WWP, no clubbing, cyanosis, or edema  Skin: Rash on anterior chest wall, no additional skin changes/discoloration       MEDICATIONS:  MEDICATIONS  (STANDING):  aspirin enteric coated 81 milliGRAM(s) Oral daily  cloNIDine 0.1 milliGRAM(s) Oral two times a day  dextrose 40% Gel 15 Gram(s) Oral once  dextrose 5%. 1000 milliLiter(s) (50 mL/Hr) IV Continuous <Continuous>  dextrose 5%. 1000 milliLiter(s) (100 mL/Hr) IV Continuous <Continuous>  dextrose 50% Injectable 25 Gram(s) IV Push once  dextrose 50% Injectable 12.5 Gram(s) IV Push once  dextrose 50% Injectable 25 Gram(s) IV Push once  escitalopram 10 milliGRAM(s) Oral daily  glucagon  Injectable 1 milliGRAM(s) IntraMuscular once  heparin   Injectable 5000 Unit(s) SubCutaneous every 8 hours  insulin lispro (ADMELOG) corrective regimen sliding scale   SubCutaneous at bedtime  macitentan 10 milliGRAM(s) Oral daily  montelukast 10 milliGRAM(s) Oral daily  NIFEdipine XL 60 milliGRAM(s) Oral daily  oxybutynin 5 milliGRAM(s) Oral two times a day  pantoprazole    Tablet 40 milliGRAM(s) Oral before breakfast  sildenafil (REVATIO) 20 milliGRAM(s) Oral three times a day    MEDICATIONS  (PRN):  acetaminophen   Tablet .. 650 milliGRAM(s) Oral every 6 hours PRN Mild Pain (1 - 3)  sodium chloride 0.65% Nasal 1 Spray(s) Both Nostrils four times a day PRN dry nose      ALLERGIES:  Allergies    ACE inhibitors (Unknown)  Motrin (Other)    Intolerances        LABS:                        11.2   4.76  )-----------( 248      ( 10 Oct 2021 06:38 )             36.3     10-10    140  |  104  |  19  ----------------------------<  126<H>  3.9   |  24  |  1.12    Ca    10.0      10 Oct 2021 06:38  Phos  2.9     10-10  Mg     1.8     10-10    TPro  6.4  /  Alb  3.5  /  TBili  0.3  /  DBili  x   /  AST  17  /  ALT  42  /  AlkPhos  70  10-10          RADIOLOGY & ADDITIONAL TESTS: Reviewed.

## 2021-10-11 NOTE — CONSULT NOTE ADULT - SUBJECTIVE AND OBJECTIVE BOX
PULMONARY SERVICE INITIAL CONSULT NOTE    HPI:  65yo woman, never smoker w/ developmental delay and PMH HTN, HLD, DM2, and asthma since childhood who presents with about 1-2 weeks of progressively worsening GOYAL a/w chest tightness, dry cough and progressively increased LE x months. The worsening chest pain is what ultimately prompted her to seek care. Describes orthopnea and improved respiratory symptoms with upright positioning. Does not feel her symptoms are similar to prior times her asthma has flared, and has had little to no relief from her inhalers, of which she is compliant in using as instructed. Asthma is managed by her PMD and denies having had PFTs checked in at least a year. Denies any prior knowledge of pulmonary HTN.    She does not work and didn't have an occupation earlier in life due to developmental disability. Has no pets or birds. Cannot recall any inhalational exposures. No new rashes or joint pain. No recent travel or immobility.     Patient subsequently admitted to cardiology to evaluate for new onset HF with reported PASP estimated to be >100 torr though official TTE is pending. Pulmonary asked to evaluate for PH workup and possible L+RHC.     REVIEW OF SYSTEMS:  Constitutional: No fever, weight loss or fatigue  Eyes: No eye pain, visual disturbances, or discharge  ENMT:  No difficulty hearing, tinnitus, vertigo; No sinus or throat pain  Neck: No pain, stiffness or neck swelling  Respiratory: see HPI  Cardiovascular: No chest pain, palpitations, dizziness or leg swelling  Gastrointestinal: No abdominal or epigastric pain. No nausea, vomiting or hematemesis; No diarrhea or constipation. No melena or hematochezia.  Genitourinary: No dysuria, frequency, hematuria or incontinence  Neurological: No headaches, memory loss, loss of strength, numbness or tremors  Skin: No itching, burning, rashes or lesions   Lymph Nodes: No enlarged glands  Endocrine: No heat or cold intolerance; No hair loss  Musculoskeletal: No joint pain or swelling; No muscle, back or extremity pain  Psychiatric: No depression, anxiety, mood swings or difficulty sleeping  Heme/Lymph: No easy bruising or bleeding gums  Allergy and Immunologic: No hives or eczema    PAST MEDICAL & SURGICAL HISTORY:  Hyperlipidemia    Hypertension    Eczema    Diabetes    Asthma    FAMILY HISTORY:    SOCIAL HISTORY:  Smoking Status: [ ] Current, [ ] Former, [X] Never  Pack Years:    MEDICATIONS:  Pulmonary:  montelukast 10 milliGRAM(s) Oral daily    Antimicrobials:    Anticoagulants:    Onc:    GI/:  oxybutynin XL 10 milliGRAM(s) Oral daily  pantoprazole    Tablet 40 milliGRAM(s) Oral before breakfast    Endocrine:    Cardiac:  ATENolol  Tablet 50 milliGRAM(s) Oral daily  cloNIDine 0.1 milliGRAM(s) Oral two times a day  furosemide   Injectable 20 milliGRAM(s) IV Push two times a day  NIFEdipine XL 60 milliGRAM(s) Oral daily    Other Medications:  escitalopram 10 milliGRAM(s) Oral daily    Allergies    ACE inhibitors (Unknown)  Motrin (Other)    Intolerances    Vital Signs Last 24 Hrs  T(C): 36.2 (06 Oct 2021 19:01), Max: 37.1 (06 Oct 2021 10:58)  T(F): 97.1 (06 Oct 2021 19:01), Max: 98.7 (06 Oct 2021 10:58)  HR: 65 (06 Oct 2021 16:58) (58 - 70)  BP: 118/75 (06 Oct 2021 16:58) (118/75 - 169/99)  BP(mean): --  RR: 16 (06 Oct 2021 16:58) (15 - 18)  SpO2: 93% (06 Oct 2021 16:58) (93% - 97%)    10-06 @ 07:01  -  10-06 @ 20:50  --------------------------------------------------------  IN: 0 mL / OUT: 430 mL / NET: -430 mL    PHYSICAL EXAM:  Constitutional: obese woman resting comfortably in bed; NAD  Head: NC/AT  EENT: PERRL, anicteric sclera; oropharynx clear, MMM  Neck: supple, no appreciable JVD though neck habitus limits exam  Respiratory: few scattered crackles bilaterally; no increased WOB, eupneic  Cardiovascular: +S1/S2, RRR  Gastrointestinal: soft, NT/ND  Extremities: WWP; 1+ pitting edema in medial thigh region, compression stockings on distal LE, no clubbing or cyanosis  Vascular: 2+ radial pulses B/L  Neurological: awake and alert; ENRIQUEZ    LABS:  CBC Full  -  ( 06 Oct 2021 11:47 )  WBC Count : 5.65 K/uL  RBC Count : 4.34 M/uL  Hemoglobin : 11.2 g/dL  Hematocrit : 36.6 %  Platelet Count - Automated : 255 K/uL  Mean Cell Volume : 84.3 fl  Mean Cell Hemoglobin : 25.8 pg  Mean Cell Hemoglobin Concentration : 30.6 gm/dL  Auto Neutrophil # : 3.98 K/uL  Auto Lymphocyte # : 1.28 K/uL  Auto Monocyte # : 0.28 K/uL  Auto Eosinophil # : 0.06 K/uL  Auto Basophil # : 0.03 K/uL  Auto Neutrophil % : 70.3 %  Auto Lymphocyte % : 22.7 %  Auto Monocyte % : 5.0 %  Auto Eosinophil % : 1.1 %  Auto Basophil % : 0.5 %    10-06    141  |  105  |  19  ----------------------------<  95  4.5   |  26  |  1.11    Ca    10.8<H>      06 Oct 2021 11:47    TPro  6.8  /  Alb  4.1  /  TBili  0.5  /  DBili  x   /  AST  141<H>  /  ALT  167<H>  /  AlkPhos  112  10-06    PT/INR - ( 06 Oct 2021 11:47 )   PT: 12.7 sec;   INR: 1.06          PTT - ( 06 Oct 2021 11:47 )  PTT:31.2 sec    RADIOLOGY & ADDITIONAL STUDIES:  Personally reviewed CXR with slight blunting of bilateral CP angles, increased interstitial markings and within the limitations of an AP film there is cardiomegaly. Poor overall inspiration however. 
  Patient is a 66y old  Female who presents with a chief complaint of pHTN (10 Oct 2021 06:51)       HPI:    Pt is 65yo Female who has a developmental delay (lives at [facility] in [place]) and has a PMHx of HTN, HLD, DM T2, and Asthma who presented to [facility] on [facility]21 endrosing one week of progressively worsening GOYAL w/ ambulation of 1 block. Pt also endorsing associated chest pain that radiates to both shoulders. She decided to present to the ED because overnight she started to have chest pain at rest that was 9/10. Pt also endorses LE edema over the past few months, and an episodes of nausea w/ 1 episode of vomiting. Pt denies abdominal pain, dizziness/syncope, palpitations, orthopnea, fever/chills.      VITALS: HR 58-70, -169/70-99, O2 94%, T 97.7F. Labs: WBC 5.65, Hgb/Hct 11.2/36.6, Plt Cnt 255; Na 141, K 4.5; BUN/Cr 19/1.11; D-Dimer < 150; Trop T < 0.01, CK/CKMB (-); BNP 2156.      Patient admitted to cardiology for further work up of suspected new onset CHF as well as r/o CAD.   (06 Oct 2021 15:12)      PAST MEDICAL & SURGICAL HISTORY:  Hyperlipidemia    Hypertension    Eczema    Diabetes    Asthma        MEDICATIONS  (STANDING):  aspirin enteric coated 81 milliGRAM(s) Oral daily  cloNIDine 0.1 milliGRAM(s) Oral two times a day  dextrose 40% Gel 15 Gram(s) Oral once  dextrose 5%. 1000 milliLiter(s) (50 mL/Hr) IV Continuous <Continuous>  dextrose 5%. 1000 milliLiter(s) (100 mL/Hr) IV Continuous <Continuous>  dextrose 50% Injectable 25 Gram(s) IV Push once  dextrose 50% Injectable 12.5 Gram(s) IV Push once  dextrose 50% Injectable 25 Gram(s) IV Push once  escitalopram 10 milliGRAM(s) Oral daily  glucagon  Injectable 1 milliGRAM(s) IntraMuscular once  heparin   Injectable 5000 Unit(s) SubCutaneous every 8 hours  insulin lispro (ADMELOG) corrective regimen sliding scale   SubCutaneous at bedtime  macitentan 10 milliGRAM(s) Oral daily  montelukast 10 milliGRAM(s) Oral daily  NIFEdipine XL 60 milliGRAM(s) Oral daily  oxybutynin 5 milliGRAM(s) Oral two times a day  pantoprazole    Tablet 40 milliGRAM(s) Oral before breakfast  sildenafil (REVATIO) 20 milliGRAM(s) Oral three times a day    MEDICATIONS  (PRN):  acetaminophen   Tablet .. 650 milliGRAM(s) Oral every 6 hours PRN Mild Pain (1 - 3)  sodium chloride 0.65% Nasal 1 Spray(s) Both Nostrils four times a day PRN dry nose        Social History:                -  Home Living Status:  lives with a roommate in an elevator accessible apartment building         -  Prior Home Care Services:   none           Baseline Functional Level Prior to Admission:             - ADL's/ IADL's:  independent         - ambulatory status PTA:  walked with a cane    FAMILY HISTORY:      CBC Full  -  ( 11 Oct 2021 06:16 )  WBC Count : 3.91 K/uL  RBC Count : 4.53 M/uL  Hemoglobin : 11.7 g/dL  Hematocrit : 38.1 %  Platelet Count - Automated : 258 K/uL  Mean Cell Volume : 84.1 fl  Mean Cell Hemoglobin : 25.8 pg  Mean Cell Hemoglobin Concentration : 30.7 gm/dL  Auto Neutrophil # : 2.25 K/uL  Auto Lymphocyte # : 1.21 K/uL  Auto Monocyte # : 0.34 K/uL  Auto Eosinophil # : 0.07 K/uL  Auto Basophil # : 0.03 K/uL  Auto Neutrophil % : 57.5 %  Auto Lymphocyte % : 30.9 %  Auto Monocyte % : 8.7 %  Auto Eosinophil % : 1.8 %  Auto Basophil % : 0.8 %      10-11    140  |  105  |  14  ----------------------------<  136<H>  4.0   |  25  |  0.86    Ca    10.3      11 Oct 2021 06:15  Phos  2.5     10-11  Mg     1.8     10-11    TPro  6.4  /  Alb  3.5  /  TBili  0.3  /  DBili  x   /  AST  17  /  ALT  42  /  AlkPhos  70  10-10            Radiology:    < from: CT Chest No Cont (10.08.21 @ 14:11) >  EXAM:  CT CHEST                          PROCEDURE DATE:  10/08/2021          INTERPRETATION:  CT of the CHEST without intravenous contrast dated 10/8/2021 2:11 PM    INDICATION: Dyspnea    r/o acute pathology    TECHNIQUE: CT of the chest was performed without intravenous contrast.  Intravenous contrast was not used Axial and coronal and sagittal images were produced and reviewed.    PRIOR STUDIES: None.    FINDINGS: The heart is normal in size.  No pericardial effusion is seen.  Evaluation ofthe vasculature is limited without intravenous contrast, but the great vessels are grossly unremarkable.  Evaluation for adenopathy is limited without intravenous contrast. Within that limitation, no mediastinal or hilar or axillary lymphadenopathy is seen. The main pulmonary artery measures 3.1 cm. The right pulmonary artery measures 2.5 cm. The left pulmonary artery measures 2.4 cm. Correlate with cardiac echo. Cannot rule out architectural distortion bilateral breasts superior aspects.    No pleural effusions are seen. Evaluation of the pulmonary parenchyma demonstrates bilateral groundglass opacities and bilateral mosaic attenuation. Study appears to have been performed in expiration. This the mosaic attenuation is thought to be air trapping. Micronodule in the right upper lobe image 50 series 5. No evidence of interstitial lung disease. No emphysema. Subtle thickening of interlobular septa in the upper lobes.    Limited evaluation of the upper abdomen demonstrates no abnormality.    Evaluation of the osseous structures demonstrates degenerative changes.  OA right glenohumeral joint.    IMPRESSION: Bilateral air trapping. Considerations include constrictive bronchiolitis and HP.                Vital Signs Last 24 Hrs  T(C): 36.7 (11 Oct 2021 05:04), Max: 36.9 (10 Oct 2021 21:37)  T(F): 98.1 (11 Oct 2021 05:04), Max: 98.4 (10 Oct 2021 21:37)  HR: 58 (11 Oct 2021 08:29) (58 - 72)  BP: 120/65 (11 Oct 2021 08:29) (118/62 - 134/60)  BP(mean): 87 (11 Oct 2021 08:29) (81 - 89)  RR: 18 (11 Oct 2021 08:29) (18 - 18)  SpO2: 95% (11 Oct 2021 08:29) (89% - 96%)        REVIEW OF SYSTEMS:    CONSTITUTIONAL: No fever, weight loss, or fatigue  EYES: No eye pain, visual disturbances, or discharge  ENMT:  No difficulty hearing, tinnitus, vertigo; No sinus or throat pain  NECK: No pain or stiffness  BREASTS: No pain, masses, or nipple discharge  RESPIRATORY:  SOB  CARDIOVASCULAR: No chest pain, palpitations, dizziness, or leg swelling  GASTROINTESTINAL: No abdominal or epigastric pain. No nausea, vomiting, or hematemesis; No diarrhea or constipation. No melena or hematochezia.  GENITOURINARY: No dysuria, frequency, hematuria, or incontinence  NEUROLOGICAL: No headaches, memory loss, loss of strength, numbness, or tremors  SKIN: No itching, burning, rashes, or lesions   LYMPH NODES: No enlarged glands  ENDOCRINE: No heat or cold intolerance; No hair loss  MUSCULOSKELETAL: No joint pain or swelling; No muscle, back, or extremity pain  PSYCHIATRIC: No depression, anxiety, mood swings, or difficulty sleeping  HEME/LYMPH: No easy bruising, or bleeding gums  ALLERGY AND IMMUNOLOGIC: No hives or eczema  VASCULAR: no swelling, erythema,           Physical Exam:  65 yo AA woman lyin ludmilan semi Merchant's position, awake, alert, no acute complaints    Head: normocephalic, atraumatic    Eyes: PERRLA, EOMI, no nystagmus, sclera anicteric    ENT: nasal discharge, uvula midline, no oropharyngeal erythema/exudate    Neck: supple, negative JVD, negative carotid bruits, no thyromegaly    Chest: CTA bilaterally, neg wheeze/ rhonchi/ rales/ crackles/ egophany    Cardiovascular: regular rate and rhythm, neg murmurs/rubs/gallops    Abdomen: soft, non distended, non tender to palpation in all 4 quadrants, negative rebound/guarding, normal bowel sounds    Extremities:  NOP LE edema, neg calf TTP      Neurologic Exam:    Alert and oriented x 3    Motor Exam:    Right UE:              no focal weakness > 4/5    Left UE:                no focal weakness > 4/5      Right LE:              no focal weakness > 4/5    Left LE:                no focal weakness > 4/5               Sensation:           intact to light touch x 4 extremities                                                 DTR:                  biceps/brachioradialis: equal                                                      patella/ankle: equal                                                   neg clonus                           neg Babinski                          Gait:  not tested        PM&R Impression:    1) deconditioned  2) no focal weakness    Recommendations/ Plan :    1) Physical therapy focusing on therapeutic exercises, bed mobility/transfer out of bed evaluation, progressive ambulation with assistive devices prn.    2) Anticipated Disposition Plan/Recs:    d/c home with home physical therapy

## 2021-10-11 NOTE — DISCHARGE NOTE PROVIDER - NSDCMRMEDTOKEN_GEN_ALL_CORE_FT
Advair  mcg-21 mcg/inh inhalation aerosol: 2 puff(s) inhaled 2 times a day  atenolol 50 mg oral tablet: 1 tab(s) orally once a day  cetirizine 10 mg oral tablet: 1 tab(s) orally once a day  cloNIDine 0.1 mg oral tablet: 1 tab(s) orally 2 times a day  escitalopram 10 mg oral tablet: 1 tab(s) orally once a day  fluticasone 50 mcg/inh inhalation powder: 1 puff(s) inhaled 2 times a day  hydroCHLOROthiazide 25 mg oral tablet: 1 tab(s) orally once a day  metFORMIN 500 mg oral tablet: 1 tab(s) orally 2 times a day  montelukast 10 mg oral tablet: 1 tab(s) orally once a day  NIFEdipine 60 mg oral tablet, extended release: 1 tab(s) orally once a day  oxybutynin 10 mg/24 hr oral tablet, extended release: 1 tab(s) orally once a day  pantoprazole 40 mg oral delayed release tablet: 1 tab(s) orally once a day  pravastatin 10 mg oral tablet: 1 tab(s) orally once a day  Ventolin HFA 90 mcg/inh inhalation aerosol: 2 puff(s) inhaled every 6 hours  Vitamin C 500 mg oral tablet, chewable: 1 tab(s) orally once a day   Advair  mcg-21 mcg/inh inhalation aerosol: 2 puff(s) inhaled 2 times a day  aspirin 81 mg oral delayed release tablet: 1 tab(s) orally once a day  atenolol 50 mg oral tablet: 1 tab(s) orally once a day  cetirizine 10 mg oral tablet: 1 tab(s) orally once a day  cloNIDine 0.1 mg oral tablet: 1 tab(s) orally 2 times a day  escitalopram 10 mg oral tablet: 1 tab(s) orally once a day  fluticasone 50 mcg/inh inhalation powder: 1 puff(s) inhaled 2 times a day  hydroCHLOROthiazide 25 mg oral tablet: 1 tab(s) orally once a day  macitentan 10 mg oral tablet: 1 tab(s) orally once a day  metFORMIN 500 mg oral tablet: 1 tab(s) orally 2 times a day  montelukast 10 mg oral tablet: 1 tab(s) orally once a day  NIFEdipine 60 mg oral tablet, extended release: 1 tab(s) orally once a day  oxybutynin 10 mg/24 hr oral tablet, extended release: 1 tab(s) orally once a day  pantoprazole 40 mg oral delayed release tablet: 1 tab(s) orally once a day  pravastatin 10 mg oral tablet: 1 tab(s) orally once a day  sildenafil 20 mg oral tablet: 1 tab(s) orally 3 times a day  Ventolin HFA 90 mcg/inh inhalation aerosol: 2 puff(s) inhaled every 6 hours  Vitamin C 500 mg oral tablet, chewable: 1 tab(s) orally once a day

## 2021-10-11 NOTE — DISCHARGE NOTE PROVIDER - CARE PROVIDER_API CALL
JAVIER SCALES  Internal Medicine  [facility] [address] [place] [address]  Phone: [phone]  Fax: [phone]  Established Patient  Follow Up Time: 2 weeks    Lili Shaw (DO)  Critical Care Medicine; Internal Medicine; Pulmonary Disease  [address] [address]  Phone: [phone]  Fax: [phone]  Follow Up Time: 2 weeks

## 2021-10-12 ENCOUNTER — TRANSCRIPTION ENCOUNTER (OUTPATIENT)
Age: 66
End: 2021-10-12

## 2021-10-12 VITALS — TEMPERATURE: 99 F

## 2021-10-12 LAB
ANA TITR SER: NEGATIVE — SIGNIFICANT CHANGE UP
ANION GAP SERPL CALC-SCNC: 10 MMOL/L — SIGNIFICANT CHANGE UP (ref 5–17)
BASOPHILS # BLD AUTO: 0.04 K/UL — SIGNIFICANT CHANGE UP (ref 0–0.2)
BASOPHILS NFR BLD AUTO: 0.9 % — SIGNIFICANT CHANGE UP (ref 0–2)
BUN SERPL-MCNC: 16 MG/DL — SIGNIFICANT CHANGE UP (ref 7–23)
CALCIUM SERPL-MCNC: 10.4 MG/DL — SIGNIFICANT CHANGE UP (ref 8.4–10.5)
CHLORIDE SERPL-SCNC: 108 MMOL/L — SIGNIFICANT CHANGE UP (ref 96–108)
CO2 SERPL-SCNC: 21 MMOL/L — LOW (ref 22–31)
CREAT SERPL-MCNC: 0.84 MG/DL — SIGNIFICANT CHANGE UP (ref 0.5–1.3)
EOSINOPHIL # BLD AUTO: 0.05 K/UL — SIGNIFICANT CHANGE UP (ref 0–0.5)
EOSINOPHIL NFR BLD AUTO: 1.2 % — SIGNIFICANT CHANGE UP (ref 0–6)
GLUCOSE BLDC GLUCOMTR-MCNC: 127 MG/DL — HIGH (ref 70–99)
GLUCOSE BLDC GLUCOMTR-MCNC: 128 MG/DL — HIGH (ref 70–99)
GLUCOSE SERPL-MCNC: 125 MG/DL — HIGH (ref 70–99)
HCT VFR BLD CALC: 36.2 % — SIGNIFICANT CHANGE UP (ref 34.5–45)
HGB BLD-MCNC: 11 G/DL — LOW (ref 11.5–15.5)
IMM GRANULOCYTES NFR BLD AUTO: 0.2 % — SIGNIFICANT CHANGE UP (ref 0–1.5)
LYMPHOCYTES # BLD AUTO: 1.26 K/UL — SIGNIFICANT CHANGE UP (ref 1–3.3)
LYMPHOCYTES # BLD AUTO: 29.6 % — SIGNIFICANT CHANGE UP (ref 13–44)
MAGNESIUM SERPL-MCNC: 1.8 MG/DL — SIGNIFICANT CHANGE UP (ref 1.6–2.6)
MCHC RBC-ENTMCNC: 25.9 PG — LOW (ref 27–34)
MCHC RBC-ENTMCNC: 30.4 GM/DL — LOW (ref 32–36)
MCV RBC AUTO: 85.2 FL — SIGNIFICANT CHANGE UP (ref 80–100)
MONOCYTES # BLD AUTO: 0.46 K/UL — SIGNIFICANT CHANGE UP (ref 0–0.9)
MONOCYTES NFR BLD AUTO: 10.8 % — SIGNIFICANT CHANGE UP (ref 2–14)
NEUTROPHILS # BLD AUTO: 2.44 K/UL — SIGNIFICANT CHANGE UP (ref 1.8–7.4)
NEUTROPHILS NFR BLD AUTO: 57.3 % — SIGNIFICANT CHANGE UP (ref 43–77)
NRBC # BLD: 0 /100 WBCS — SIGNIFICANT CHANGE UP (ref 0–0)
PHOSPHATE SERPL-MCNC: 2.5 MG/DL — SIGNIFICANT CHANGE UP (ref 2.5–4.5)
PLATELET # BLD AUTO: 219 K/UL — SIGNIFICANT CHANGE UP (ref 150–400)
POTASSIUM SERPL-MCNC: 4.3 MMOL/L — SIGNIFICANT CHANGE UP (ref 3.5–5.3)
POTASSIUM SERPL-SCNC: 4.3 MMOL/L — SIGNIFICANT CHANGE UP (ref 3.5–5.3)
RBC # BLD: 4.25 M/UL — SIGNIFICANT CHANGE UP (ref 3.8–5.2)
RBC # FLD: 17.7 % — HIGH (ref 10.3–14.5)
SARS-COV-2 RNA SPEC QL NAA+PROBE: SIGNIFICANT CHANGE UP
SODIUM SERPL-SCNC: 139 MMOL/L — SIGNIFICANT CHANGE UP (ref 135–145)
WBC # BLD: 4.26 K/UL — SIGNIFICANT CHANGE UP (ref 3.8–10.5)
WBC # FLD AUTO: 4.26 K/UL — SIGNIFICANT CHANGE UP (ref 3.8–10.5)

## 2021-10-12 PROCEDURE — 99233 SBSQ HOSP IP/OBS HIGH 50: CPT | Mod: GC

## 2021-10-12 PROCEDURE — 99233 SBSQ HOSP IP/OBS HIGH 50: CPT | Mod: GC,25

## 2021-10-12 RX ORDER — ASPIRIN/CALCIUM CARB/MAGNESIUM 324 MG
1 TABLET ORAL
Qty: 30 | Refills: 0
Start: 2021-10-12 | End: 2021-11-10

## 2021-10-12 RX ORDER — MAGNESIUM SULFATE 500 MG/ML
1 VIAL (ML) INJECTION ONCE
Refills: 0 | Status: COMPLETED | OUTPATIENT
Start: 2021-10-12 | End: 2021-10-12

## 2021-10-12 RX ORDER — MACITENTAN 10 MG/1
1 TABLET, FILM COATED ORAL
Qty: 30 | Refills: 0
Start: 2021-10-12 | End: 2021-11-10

## 2021-10-12 RX ADMIN — HEPARIN SODIUM 5000 UNIT(S): 5000 INJECTION INTRAVENOUS; SUBCUTANEOUS at 13:20

## 2021-10-12 RX ADMIN — MONTELUKAST 10 MILLIGRAM(S): 4 TABLET, CHEWABLE ORAL at 13:19

## 2021-10-12 RX ADMIN — Medication 81 MILLIGRAM(S): at 13:20

## 2021-10-12 RX ADMIN — Medication 650 MILLIGRAM(S): at 07:19

## 2021-10-12 RX ADMIN — Medication 20 MILLIGRAM(S): at 06:03

## 2021-10-12 RX ADMIN — Medication 650 MILLIGRAM(S): at 08:00

## 2021-10-12 RX ADMIN — ESCITALOPRAM OXALATE 10 MILLIGRAM(S): 10 TABLET, FILM COATED ORAL at 13:20

## 2021-10-12 RX ADMIN — Medication 100 GRAM(S): at 10:04

## 2021-10-12 RX ADMIN — Medication 20 MILLIGRAM(S): at 13:19

## 2021-10-12 RX ADMIN — HEPARIN SODIUM 5000 UNIT(S): 5000 INJECTION INTRAVENOUS; SUBCUTANEOUS at 06:03

## 2021-10-12 RX ADMIN — Medication 60 MILLIGRAM(S): at 06:03

## 2021-10-12 RX ADMIN — PANTOPRAZOLE SODIUM 40 MILLIGRAM(S): 20 TABLET, DELAYED RELEASE ORAL at 06:03

## 2021-10-12 RX ADMIN — Medication 5 MILLIGRAM(S): at 06:03

## 2021-10-12 RX ADMIN — Medication 0.1 MILLIGRAM(S): at 06:03

## 2021-10-12 NOTE — PROGRESS NOTE ADULT - ASSESSMENT
66 F with developmental delay, HTN, HLD, DM2, asthma, and ?hx of MI who presents with progressive worsening GOYAL associated with chest discomfort x1-2 weeks. Found to have elevated PASP on ECHO, confirmed severe pre-capillary hypertension via right-heart catheterization. Pulmonary following for workup and management of pulmonary hypertension.    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 Left heart cath was done at same time that showed no signs of obstructive disease and wedge was normal, ruling out WHO group 2. Patient is still at risk given obesity for group 3 caused by OHS/RG, however bicarb levels are within normal limits making OHS less likely. Patient will need home sleep study to evaluate RG contributing. CT of the chest shows diffuse mosaic attenuation in expiration, indicating air trapping likely present but no inspiratory imaging to compare to. Does have dilated airways bilaterally as well making airtrapping more likely in the setting of bronchiectasis but will need definitive inspiratory and expiratory imagining to evaluate. HP is a consideration but there are no signs of fibrosis that could explain patients pulmonary HTN. V/Q scan was negative ruling our group 4 CTEPH. Patient has no family hx of rheumatologic disease and RF negative as well as scleroderma. Will need ALEJANDRO, dsDNA, anti CCP, and anti-RNP to complete rheumatology eval for group 1 work up. HIV negative as well as UDS. US of abdomen shows no signs of portal htn as well. Because of likely primary group 1 disease patient was started on sildenafil 20mg TID and macitentan 10mg daily. This seems to have improved patient functional ability as she is off oxygen and able to walk with less if any SOB.       Recommendations:  -Continue sildenafil 20mg TID   -Continue macitentan 10mg PO daily with LFT trending to monitor liver function and CBC to monitor hgb  -Will plan to DC once patient is able to get sildenafil and macitentan at home facility with instructions given on handling and side effects  -PFTs, and 6 minute-walk test as outpatient  -Pt will need home sleep study to assess for RG       Plan discussed with attending Dr. Shaw and primary team.

## 2021-10-12 NOTE — PROGRESS NOTE ADULT - ATTENDING COMMENTS
Pt seen and examined at bedside, agree with above. 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 Complete rheumatological studies pending, but otherwise no manifestations of rheumatologic condition, US abdomen without evidence of cirrhosis or portal hypertension, V/Q negative for CTEPH, HIV negative, LHC without CAD and TTE with normal LV size/function, no valvular disease. CT chest with mosaicism, otherwise no fibrosis or significant parenchymal disease to explain such severe PH. Will need to obtain out patient PFTs and six min walk test. Started on dual oral therapy, sildenafil and macitentan, able to obtain medications today and plan is to d/c home. Please have patient follow up in the PH clinic with myself in 2-3 weeks from discharge, office # [phone].    Requires out patient 6MWT and PFTs    Discharge instructions for group home:    Symptom tracker  - Patients with PAH can develop worsening shortness of breath with exertion. Monitor how much physical activity you are able to do on a daily basis  - Let your doctor know if you develop increased difficulty breathing, swelling in her abdomen or legs/feet, nausea, fast heart rate, or dizziness  - Daily exercise is recommended, but if you develop severe shortness of breath, dizziness, or feeling like you are going to pass out, sit immediately and seek medical advice. Cardiovascular exercises such as walking, biking, swimming, aerobic activity is helpful. Avoid any heavy lifting (>10 lbs), squatting or bending during exercise.   - Check daily weights. Weight can fluctuate up and down normally 1-3 lbs on a day to day basis. If you have an increase of weight by > 3lbs in one day or you notice an increase in weight multiple days in a row, please call the office as you may be developing worsening edema.   - Low salt diet is required as increased salt can lead to increased water retention.   - PAH education documents faxed to patient's home.     Medication side effects  - Headaches, flushing, nausea, leg pain, leg swelling, and nasal congestion can occur  - Tylenol is helpful for aches/pains and is safe to use in PAH. Nasal sprays can help with congestion. Patients may require diuretics (water pill) to help with any increasing leg swelling while on the medication  - Opsumit CANNOT be crushed. Must be taken whole. Teratogenic effects noted also with crushing of medication by medication handlers.

## 2021-10-12 NOTE — DISCHARGE NOTE NURSING/CASE MANAGEMENT/SOCIAL WORK - PATIENT PORTAL LINK FT
You can access the FollowMyHealth Patient Portal offered by [facility] by registering at the following website: [email or web address]. By joining [facility] FollowMyHealth portal, you will also be able to view your health information using other applications (apps) compatible with our system.

## 2021-10-12 NOTE — PROGRESS NOTE ADULT - SUBJECTIVE AND OBJECTIVE BOX
**INCOMPLETE NOTE    INTERVAL HPI/OVERNIGHT EVENTS:    OVERNIGHT: No overnight events.  SUBJECTIVE: Patient seen and examined at bedside.     ROS:  CV: Denies chest pain  Resp: Denies SOB  GI: Denies abdominal pain, constipation, diarrhea, nausea, vomiting  : Denies dysuria  ID: Denies fevers, chills  MSK: Denies joint pain     OBJECTIVE:    VITAL SIGNS:  ICU Vital Signs Last 24 Hrs  T(C): 36.8 (12 Oct 2021 06:36), Max: 36.8 (12 Oct 2021 06:36)  T(F): 98.2 (12 Oct 2021 06:36), Max: 98.2 (12 Oct 2021 06:36)  HR: 62 (12 Oct 2021 06:00) (56 - 72)  BP: 157/82 (12 Oct 2021 06:00) (113/57 - 157/82)  BP(mean): 110 (12 Oct 2021 06:00) (81 - 110)  ABP: --  ABP(mean): --  RR: 18 (12 Oct 2021 06:00) (18 - 18)  SpO2: 94% (12 Oct 2021 06:00) (93% - 97%)        10-10 @ 07:01  -  10-11 @ 07:00  --------------------------------------------------------  IN: 0 mL / OUT: 200 mL / NET: -200 mL    10-11 @ 07:01  -  10-12 @ 06:51  --------------------------------------------------------  IN: 0 mL / OUT: 200 mL / NET: -200 mL      CAPILLARY BLOOD GLUCOSE      POCT Blood Glucose.: 127 mg/dL (12 Oct 2021 06:25)      PHYSICAL EXAM:  General: NAD, comfortable  HEENT: NCAT, PERRL, clear conjunctiva, no scleral icterus  Neck: supple, no JVD  Respiratory: CTA b/l, no wheezing, rhonchi, rales  Cardiovascular: RRR, normal S1S2, no M/R/G  Vascular: 2+ radial and DP pulses  Abdomen: soft, NT/ND, bowel sounds in all four quadrants, no palpable masses  Extremities: WWP, no clubbing, cyanosis, or edema  Skin: No rashes present  Neuro:     MEDICATIONS:  MEDICATIONS  (STANDING):  aspirin enteric coated 81 milliGRAM(s) Oral daily  cloNIDine 0.1 milliGRAM(s) Oral two times a day  dextrose 40% Gel 15 Gram(s) Oral once  dextrose 5%. 1000 milliLiter(s) (50 mL/Hr) IV Continuous <Continuous>  dextrose 5%. 1000 milliLiter(s) (100 mL/Hr) IV Continuous <Continuous>  dextrose 50% Injectable 25 Gram(s) IV Push once  dextrose 50% Injectable 12.5 Gram(s) IV Push once  dextrose 50% Injectable 25 Gram(s) IV Push once  escitalopram 10 milliGRAM(s) Oral daily  glucagon  Injectable 1 milliGRAM(s) IntraMuscular once  heparin   Injectable 5000 Unit(s) SubCutaneous every 8 hours  insulin lispro (ADMELOG) corrective regimen sliding scale   SubCutaneous at bedtime  macitentan 10 milliGRAM(s) Oral daily  montelukast 10 milliGRAM(s) Oral daily  NIFEdipine XL 60 milliGRAM(s) Oral daily  oxybutynin 5 milliGRAM(s) Oral two times a day  pantoprazole    Tablet 40 milliGRAM(s) Oral before breakfast  sildenafil (REVATIO) 20 milliGRAM(s) Oral three times a day    MEDICATIONS  (PRN):  acetaminophen   Tablet .. 650 milliGRAM(s) Oral every 6 hours PRN Mild Pain (1 - 3)  sodium chloride 0.65% Nasal 1 Spray(s) Both Nostrils four times a day PRN dry nose      ALLERGIES:  Allergies    ACE inhibitors (Unknown)  Motrin (Other)    Intolerances        LABS:                        11.7   3.91  )-----------( 258      ( 11 Oct 2021 06:16 )             38.1     10-11    140  |  105  |  14  ----------------------------<  136<H>  4.0   |  25  |  0.86    Ca    10.3      11 Oct 2021 06:15  Phos  2.5     10-11  Mg     1.8     10-11            RADIOLOGY & ADDITIONAL TESTS: Reviewed.

## 2021-10-12 NOTE — PROGRESS NOTE ADULT - SUBJECTIVE AND OBJECTIVE BOX
PULMONARY CONSULT SERVICE FOLLOW-UP NOTE    INTERVAL HPI:  Reviewed chart and overnight events; patient seen and examined at bedside. Patient resting comfortably in bed this morning remaining 95-97 on room air. States that she is having come chest pain that is reproducible to palpation. Patient admits to improvement in breathing since admission and is up walking with PT.    MEDICATIONS:  Pulmonary:  montelukast 10 milliGRAM(s) Oral daily    Antimicrobials:    Anticoagulants:  aspirin enteric coated 81 milliGRAM(s) Oral daily  heparin   Injectable 5000 Unit(s) SubCutaneous every 8 hours    Cardiac:  cloNIDine 0.1 milliGRAM(s) Oral two times a day  macitentan 10 milliGRAM(s) Oral daily  NIFEdipine XL 60 milliGRAM(s) Oral daily  sildenafil (REVATIO) 20 milliGRAM(s) Oral three times a day      Allergies    ACE inhibitors (Unknown)  Motrin (Other)    Intolerances        Vital Signs Last 24 Hrs  T(C): 36.7 (12 Oct 2021 10:16), Max: 36.8 (12 Oct 2021 06:36)  T(F): 98 (12 Oct 2021 10:16), Max: 98.2 (12 Oct 2021 06:36)  HR: 54 (12 Oct 2021 08:34) (54 - 72)  BP: 157/91 (12 Oct 2021 08:34) (113/57 - 157/91)  BP(mean): 118 (12 Oct 2021 08:34) (81 - 118)  RR: 18 (12 Oct 2021 08:34) (18 - 18)  SpO2: 94% (12 Oct 2021 08:34) (93% - 97%)    10-11 @ 07:01  -  10-12 @ 07:00  --------------------------------------------------------  IN: 0 mL / OUT: 200 mL / NET: -200 mL          PHYSICAL EXAM:  Constitutional: WDWN  HEENT: NC/AT; PERRL, anicteric sclera; MMM  Neck: supple  Cardiovascular: +S1/S2, RRR  Respiratory: CTA B/L; no W/R/R  Gastrointestinal: soft, NT/ND  Extremities: WWP; no edema, clubbing or cyanosis  Vascular: 2+ radial pulses B/L  Neurological: awake and alert; ENRIQUEZ    LABS:      CBC Full  -  ( 12 Oct 2021 07:16 )  WBC Count : 4.26 K/uL  RBC Count : 4.25 M/uL  Hemoglobin : 11.0 g/dL  Hematocrit : 36.2 %  Platelet Count - Automated : 219 K/uL  Mean Cell Volume : 85.2 fl  Mean Cell Hemoglobin : 25.9 pg  Mean Cell Hemoglobin Concentration : 30.4 gm/dL  Auto Neutrophil # : 2.44 K/uL  Auto Lymphocyte # : 1.26 K/uL  Auto Monocyte # : 0.46 K/uL  Auto Eosinophil # : 0.05 K/uL  Auto Basophil # : 0.04 K/uL  Auto Neutrophil % : 57.3 %  Auto Lymphocyte % : 29.6 %  Auto Monocyte % : 10.8 %  Auto Eosinophil % : 1.2 %  Auto Basophil % : 0.9 %    10-12    139  |  108  |  16  ----------------------------<  125<H>  4.3   |  21<L>  |  0.84    Ca    10.4      12 Oct 2021 07:16  Phos  2.5     10-12  Mg     1.8     10-12                        RADIOLOGY & ADDITIONAL STUDIES:

## 2021-10-12 NOTE — PROGRESS NOTE ADULT - PROVIDER SPECIALTY LIST ADULT
Internal Medicine
Pulmonology
Cardiology
Cardiology

## 2021-10-12 NOTE — DISCHARGE NOTE NURSING/CASE MANAGEMENT/SOCIAL WORK - 
ADDITIONAL INFORMATION
Oncology Interdisciplinary rounds were held today to discuss patient plan of care and outcomes. The following members were present: Nursing and Case Management.     ALOS: 1  DRG GLOS: 3.5    Plan of Care Discharge Disposition   Oncology consult  Palliative consult  Breathing treatments  Up ad [facility]  Patient is not sure of the date of vaccination .

## 2021-10-12 NOTE — PROGRESS NOTE ADULT - ASSESSMENT
66 F with developmental disability, HTN, HLD, DM2, asthma, and ?hx of MI who presents with progressive worsening GOYAL associated with chest discomfort x1-2 weeks, admitted to cardiac telemetry. Found to have elevated PASP on ECHO, confirmed severe pre-capillary hypertension via right-heart catheterization. Pulmonary following for workup and management of pulmonary hypertension.    NEUROLOGY  # Anxiety/Depression  - c/w home Lexapro 10mg qd     CARDIOVASCULAR  #R Heart Failure  - Presents w/ progressive dyspnea and chest pain   - Left heart cath on 10/7 negative for evidence of obstructive coronary artery disease   - EKG w/ diffuse TWI in II/III/aVF/V2-V6  - Echo 10/5: normal LV size/fxn, dilated RV, mildly reduced RV systolic fxn, dilated RA, mild TR, pulmHTN (PASP 101).  - S/p Lasix 20mg IV BID   - Cardiology following     Plan:   - Lasix discontinued in the setting of impaired renal function. No further diuresis needed.   - c/w aspirin 81mg    #Hypertension   - Home med atenolol 50mg qd, clonidine 0.1 BID, hydrochlorothiazide 25mg qd      Plan:   -c/w Clonidine 0.1 BID and Nifedipine 60mg qd  - Hold home atenolol due to bradycardia and to avoid hypotension starting Sildenafil   - Hydrochlorothiazide 25mg held due to impaired renal function.       #Hyperlipidemia   - Home med pravastatin 10mg     Plan:   - Statin discontinued in the setting of transaminitis     PULMONARY    # Pulmonary Hypertension   - s/p RHC which confirms severe precapillary PH:  mean PAP 55mmHg, PAWP 10mmHg, CO/CI 3.3/1.66 by thermodilution, PVR 14 FREDERICK  - No evidence of vasoreactivity on RHC - does not meet criteria  - potential component of underlying RG/OHS given elevated BMI  - work up for etiology of pHTN ongoing, pulm suspects idiopathic PAH  - V/Q scan shows no CTEPH. CT scan shows b/l air trapping.    Plan:   - f/u rheum w/u for underlying causes of type 1 pHTN: ALEJANDRO, anca, scleroderma antibodies, Rheumatoid Factor, antitopoisomerase antibody (SCL70), TFTs, HIV test.    Plan:   - f/u PFTs, 6 min walk test, sleep study outpatient   - c/w Sildenafil 20mg TID   - c/w Opsumit 10mg qd    - Plan for outpatient sleep study to assess RG     #Asthma   - Home meds: Advair 2 puffs BID, fluticasone 50mcg qd, Montelukast 10mg qd, Ventolin q6    Plan:  -c/w Montelukast 10mg     GASTROINTESTINAL    #Transaminitis   - AST: 141, ALT: 167 on presentation    Plan:   - No abnormalities on abdominal ultrasound   - continue to hold statin   - LFTs downtrending     RENAL  #No active issues     #Overeactive Bladder   - c/w home oxybutynin 10mg qd       INFECTIOUS DISEASE  #No active issues     ENDOCRINE  #DM  - On Metformin 500mg BID at home   - A1C: 6.3    Plan:   -ISS     HEME  #No active issues     FLUIDS/ELECTROLYTES/NUTRITION  -IVF: None   -Monitor, Replete to K>4 and Mg>2  -Diet: Dash/TLC    PROPHYLAXIS  -DVT: Heparin subQ  -GI: Protonix 40mg qd     DISPO: 7L > [facility]  CODE STATUS: Full Code

## 2021-10-13 LAB
AUTO DIFF PNL BLD: NEGATIVE — SIGNIFICANT CHANGE UP
C-ANCA SER-ACNC: NEGATIVE — SIGNIFICANT CHANGE UP
P-ANCA SER-ACNC: NEGATIVE — SIGNIFICANT CHANGE UP

## 2021-10-14 PROBLEM — L30.9 DERMATITIS, UNSPECIFIED: Chronic | Status: ACTIVE | Noted: 2021-10-06

## 2021-10-14 PROBLEM — I10 ESSENTIAL (PRIMARY) HYPERTENSION: Chronic | Status: ACTIVE | Noted: 2021-10-06

## 2021-10-14 PROBLEM — E78.5 HYPERLIPIDEMIA, UNSPECIFIED: Chronic | Status: ACTIVE | Noted: 2021-10-06

## 2021-10-14 PROBLEM — J45.909 UNSPECIFIED ASTHMA, UNCOMPLICATED: Chronic | Status: ACTIVE | Noted: 2021-10-06

## 2021-10-14 PROBLEM — E11.9 TYPE 2 DIABETES MELLITUS WITHOUT COMPLICATIONS: Chronic | Status: ACTIVE | Noted: 2021-10-06

## 2021-10-19 DIAGNOSIS — I50.30 UNSPECIFIED DIASTOLIC (CONGESTIVE) HEART FAILURE: ICD-10-CM

## 2021-10-19 DIAGNOSIS — I25.118 ATHEROSCLEROTIC HEART DISEASE OF NATIVE CORONARY ARTERY WITH OTHER FORMS OF ANGINA PECTORIS: ICD-10-CM

## 2021-10-19 DIAGNOSIS — R74.01 ELEVATION OF LEVELS OF LIVER TRANSAMINASE LEVELS: ICD-10-CM

## 2021-10-19 DIAGNOSIS — R62.50 UNSPECIFIED LACK OF EXPECTED NORMAL PHYSIOLOGICAL DEVELOPMENT IN CHILDHOOD: ICD-10-CM

## 2021-10-19 DIAGNOSIS — E78.5 HYPERLIPIDEMIA, UNSPECIFIED: ICD-10-CM

## 2021-10-19 DIAGNOSIS — J45.909 UNSPECIFIED ASTHMA, UNCOMPLICATED: ICD-10-CM

## 2021-10-19 DIAGNOSIS — E66.9 OBESITY, UNSPECIFIED: ICD-10-CM

## 2021-10-19 DIAGNOSIS — E66.2 MORBID (SEVERE) OBESITY WITH ALVEOLAR HYPOVENTILATION: ICD-10-CM

## 2021-10-19 DIAGNOSIS — E11.9 TYPE 2 DIABETES MELLITUS WITHOUT COMPLICATIONS: ICD-10-CM

## 2021-10-19 DIAGNOSIS — I11.0 HYPERTENSIVE HEART DISEASE WITH HEART FAILURE: ICD-10-CM

## 2021-10-19 DIAGNOSIS — I27.20 PULMONARY HYPERTENSION, UNSPECIFIED: ICD-10-CM

## 2021-10-19 DIAGNOSIS — L30.9 DERMATITIS, UNSPECIFIED: ICD-10-CM

## 2021-10-19 DIAGNOSIS — I27.21 SECONDARY PULMONARY ARTERIAL HYPERTENSION: ICD-10-CM

## 2021-10-19 DIAGNOSIS — R53.1 WEAKNESS: ICD-10-CM

## 2021-10-25 ENCOUNTER — NON-APPOINTMENT (OUTPATIENT)
Age: 66
End: 2021-10-25

## 2021-10-25 PROBLEM — I25.10 CAD (CORONARY ARTERY DISEASE): Status: ACTIVE | Noted: 2021-10-25

## 2021-10-25 PROBLEM — E11.9 TYPE 2 DIABETES MELLITUS: Status: ACTIVE | Noted: 2021-10-25

## 2021-10-25 PROBLEM — E78.5 HYPERLIPIDEMIA: Status: ACTIVE | Noted: 2021-10-25

## 2021-10-25 PROBLEM — F89 DEVELOPMENTAL DISABILITY: Status: ACTIVE | Noted: 2021-10-25

## 2021-10-25 PROBLEM — I10 ESSENTIAL HYPERTENSION: Status: ACTIVE | Noted: 2021-10-25

## 2021-10-25 RX ORDER — MONTELUKAST 10 MG/1
10 TABLET, FILM COATED ORAL
Refills: 0 | Status: ACTIVE | COMMUNITY

## 2021-10-25 RX ORDER — ALBUTEROL SULFATE 90 UG/1
108 (90 BASE) AEROSOL, METERED RESPIRATORY (INHALATION)
Refills: 0 | Status: ACTIVE | COMMUNITY

## 2021-10-25 RX ORDER — CLONIDINE HYDROCHLORIDE 0.1 MG/1
0.1 TABLET ORAL TWICE DAILY
Refills: 0 | Status: ACTIVE | COMMUNITY

## 2021-10-25 RX ORDER — NIFEDIPINE 60 MG/1
60 TABLET, EXTENDED RELEASE ORAL
Refills: 0 | Status: ACTIVE | COMMUNITY

## 2021-10-26 ENCOUNTER — APPOINTMENT (OUTPATIENT)
Dept: PULMONOLOGY | Facility: CLINIC | Age: 66
End: 2021-10-26
Payer: MEDICARE

## 2021-10-26 VITALS
TEMPERATURE: 97.4 F | WEIGHT: 215 LBS | OXYGEN SATURATION: 91 % | DIASTOLIC BLOOD PRESSURE: 59 MMHG | BODY MASS INDEX: 36.7 KG/M2 | HEART RATE: 63 BPM | HEIGHT: 64 IN | SYSTOLIC BLOOD PRESSURE: 109 MMHG

## 2021-10-26 DIAGNOSIS — E78.5 HYPERLIPIDEMIA, UNSPECIFIED: ICD-10-CM

## 2021-10-26 DIAGNOSIS — Z11.59 ENCOUNTER FOR SCREENING FOR OTHER VIRAL DISEASES: ICD-10-CM

## 2021-10-26 DIAGNOSIS — Z23 ENCOUNTER FOR IMMUNIZATION: ICD-10-CM

## 2021-10-26 DIAGNOSIS — E11.9 TYPE 2 DIABETES MELLITUS W/OUT COMPLICATIONS: ICD-10-CM

## 2021-10-26 DIAGNOSIS — I25.10 ATHEROSCLEROTIC HEART DISEASE OF NATIVE CORONARY ARTERY W/OUT ANGINA PECTORIS: ICD-10-CM

## 2021-10-26 DIAGNOSIS — F89 UNSPECIFIED DISORDER OF PSYCHOLOGICAL DEVELOPMENT: ICD-10-CM

## 2021-10-26 DIAGNOSIS — Z87.19 PERSONAL HISTORY OF OTHER DISEASES OF THE DIGESTIVE SYSTEM: ICD-10-CM

## 2021-10-26 DIAGNOSIS — N32.81 OVERACTIVE BLADDER: ICD-10-CM

## 2021-10-26 DIAGNOSIS — I10 ESSENTIAL (PRIMARY) HYPERTENSION: ICD-10-CM

## 2021-10-26 PROCEDURE — 90662 IIV NO PRSV INCREASED AG IM: CPT

## 2021-10-26 PROCEDURE — G0008: CPT

## 2021-10-26 PROCEDURE — 99214 OFFICE O/P EST MOD 30 MIN: CPT | Mod: 25

## 2021-10-26 PROCEDURE — 99204 OFFICE O/P NEW MOD 45 MIN: CPT | Mod: 25

## 2021-10-26 RX ORDER — ATENOLOL 50 MG/1
50 TABLET ORAL
Refills: 0 | Status: DISCONTINUED | COMMUNITY
End: 2021-10-26

## 2021-10-26 RX ORDER — PANTOPRAZOLE SODIUM 40 MG/1
40 TABLET, DELAYED RELEASE ORAL
Refills: 0 | Status: ACTIVE | COMMUNITY

## 2021-10-26 RX ORDER — FLUTICASONE PROPIONATE 50 UG/1
50 SPRAY, METERED NASAL
Refills: 0 | Status: ACTIVE | COMMUNITY

## 2021-10-26 NOTE — ASSESSMENT
[FreeTextEntry1] : 67 yo F with hx of asthma, HTN, HPL, DM presented to [facility] with SOB, found to have severe precapillary PAH with PASP 100mmHg on TTE and dilated RV with mildly reduced function\par \par 1. Pulmonary Hypertension - WHO Group I PAH RHC 10/7/21 with mPAP 55, PAWP 10, CO/CI 3.31/1.66, PVR 14WU. Suspect 2/2 idiopathic PAH given work up for etiology has thus far been negative. Rheum studies negative (ALEJANDRO, RF, Scleroderma ab, complement), US abdomen without evidence of cirrhosis or portal hypertension, V/Q negative for CTEPH, HIV negative, LHC without CAD and TTE with normal LV size/function, no valvular disease. CT chest with mosaicism, otherwise no fibrosis or significant parenchymal disease. Has history of asthma and CT suggestive of bronchiolitis or airways disease, but does not explain severity of PH. Still need to obtain out patient PFTs and six min walk test as well as sleep study. Given she does not have 24/7 care, will obtain in house split night sleep study. Started on dual oral therapy, sildenafil and macitentan, and tolerating therapy well with already some improvement in exercise tolerance. \par \par Plan:\par -is still on atenolol despite d/c instructions to stop medication. BP and HR borderline low and risk of negative inotropy with atenolol. Discussed with group home discontinuation of medication.\par -instructed to call if gains 2 lbs in 24 hours or 5 pounds in 1 week\par -pro BNP today to trend\par -referral to pulmonary rehab\par -PFT and 6MWT at next visit in 2 months to complete risk score\par -echo in 3-4 months\par -split night sleep study\par - severity of PAH based on RHC is severe and if persistently symptomatic would usually be considered for triple therapy. However, may be difficult to add third agent given complexity with titration and significant side effects. Will likely try increasing sildenafil rather than adding prostacyclin\par \par 2. Asthma- Long history of asthma, CT with evidence of mosaicism suggestive of air trapping vs. constrictive bronchiolitis. \par \par Plan\par -continue with home inhalers for now, Advair and prn albuterol\par -obtain full PFTs\par -pending results of PFTs may consider inspiratory/expiratory noncon CT to evaluate mosaicism seen \par -pulmonary rehab as above\par \par 3. Nocturnal cough - on PPI and flonase, CT chest with areas of esophageal dilation, high suspicion for GERD. Continue with PPI and elevation of head when sleeping.\par \par 4. Health maintenance\par -High dose Fluzone administered in Left deltoid today\par \par RTC 2 months w/ PFTs and 6MWT

## 2021-10-26 NOTE — HISTORY OF PRESENT ILLNESS
[TextBox_4] : 66 F with developmental disability, HTN, HLD, DM2, asthma, and CAD who presents with progressive worsening GOYAL associated with chest discomfort x1-2 weeks, found to have elevated PASP on ECHO, confirmed severe pre-capillary hypertension via right-heart catheterization (RAP 9 PAP 95/35/55  PAWP 10 Thermodilution CO/CI  3.31/1.66  Chong 6.44/3.23  PVR thermodilution 14 FREDERICK). She does not have LV dysfunction and V/Q scan was negative.  She was started on sildenafil 20 mg TID and Opsumit 10 mg daily. \par \par She has been vaccinated against COVID but has yet to receive the flu vaccine\par Since hospital d/c she feels "much better"\par She still gets SOB when she exerts herself and needs to use her Ventolin\par She can walk less than 2 blocks\par Swelling in her legs is about the same\par She has not noticed a difference in the edema with the new PH medications\par No headaches\par A little fatigue\par Gets a little dizzy a night when it is time for bed\par No falls\par Occasional palpitations in the morning\par She coughs at night before bed when she is lying down\par Ventolin helps when she is SOB\par She is weighed daily

## 2021-10-26 NOTE — PROCEDURE
[FreeTextEntry1] : 10/8/21 CT CHEST\par Bilateral air trapping. Considerations include constrictive bronchiolitis and HP.\par *****\par 10/8/21 V/Q scan\par There is no evidence of a segmental or subsegmental perfusion defect to suggest pulmonary embolism. Ventilation imaging is suboptimal.\par Impression: No evidence of pulmonary embolism.\par *****\par 10/7/21 LHC & RHC\par Non-obstructive CAD, normal LV function\par RAP 9\par PAP 95/35 (55)\par PAWP 10\par Thermodilution CO/CI 3.31/1.66  Scar (assumed VO@ 296.87) 6.44/3.23\par PVR (thermodilution) 14 FREDERICK\par \par Wilver\par PAP 82/35 (47)\par PAWP 10\par Thermodilution CO/CI 3.45/1.72  Scar 5.27/2.65\par PVR (thermodilution) 10.7 FREDERICK\par \par Consistent with severe precapillary pulmonary arterial hypertension and low CO/CI. Of note, significant discordance between Scar and thermodilution CO/CI> Assumed SCAR VO2 is 262.87 which is likely overestimated and falsely increasing CO/CI. In absence of severe TR or L -> shunt would favor thermodilution. \par *****\par 10/6/21 ECHOCARDIOGRAM\par  1. Normal left ventricular size and systolic function.\par  2. Dilated right ventricular size.\par  3. Mildly reduced right ventricular systolic function.\par  4. Dilated right atrium.\par  5. Mild tricuspid regurgitation.\par  6. No other significant valvular disease.\par  7. Pulmonary hypertension present, pulmonary artery systolic pressure is 101 mmHg.\par  8. No pericardial effusion.\par  9. No prior echo is available for comparison.\par TAPSE 20  RV S’ 8\par

## 2021-10-26 NOTE — PHYSICAL EXAM
[No Acute Distress] : no acute distress [No Deformities] : no deformities [IV] : Mallampati Class: IV [Normal Appearance] : normal appearance [No Neck Mass] : no neck mass [No JVD] : no jvd [Normal Rate/Rhythm] : normal rate/rhythm [Normal S1, S2] : normal s1, s2 [No Resp Distress] : no resp distress [Clear to Auscultation Bilaterally] : clear to auscultation bilaterally [No Abnormalities] : no abnormalities [Benign] : benign [No Clubbing] : no clubbing [No Cyanosis] : no cyanosis [Non-Pitting] : non-pitting [Normal Color/ Pigmentation] : normal color/ pigmentation [No Focal Deficits] : no focal deficits [Oriented x3] : oriented x3 [Normal Affect] : normal affect

## 2021-10-27 ENCOUNTER — NON-APPOINTMENT (OUTPATIENT)
Age: 66
End: 2021-10-27

## 2021-10-27 LAB — NT-PROBNP SERPL-MCNC: 925 PG/ML

## 2021-10-29 ENCOUNTER — APPOINTMENT (OUTPATIENT)
Dept: PULMONOLOGY | Facility: CLINIC | Age: 66
End: 2021-10-29

## 2021-11-03 ENCOUNTER — APPOINTMENT (OUTPATIENT)
Dept: SLEEP CENTER | Facility: HOSPITAL | Age: 66
End: 2021-11-03

## 2021-11-03 ENCOUNTER — OUTPATIENT (OUTPATIENT)
Dept: OUTPATIENT SERVICES | Facility: HOSPITAL | Age: 66
LOS: 1 days | End: 2021-11-03
Payer: MEDICARE

## 2021-11-03 DIAGNOSIS — G47.33 OBSTRUCTIVE SLEEP APNEA (ADULT) (PEDIATRIC): ICD-10-CM

## 2021-11-03 PROCEDURE — 95810 POLYSOM 6/> YRS 4/> PARAM: CPT | Mod: 26

## 2021-11-03 PROCEDURE — 95810 POLYSOM 6/> YRS 4/> PARAM: CPT

## 2021-12-17 NOTE — ED ADULT NURSE NOTE - MUSCLE PAIN OR WEAKNESS
Relevant Problems   RESPIRATORY SYSTEM   (+) SOB (shortness of breath)      CARDIOVASCULAR   (+) CAD (coronary artery disease)   (+) Chronic systolic congestive heart failure (HCC)      ENDOCRINE   (+) Obesity, morbid (HCC)   (+) Type 2 diabetes with nephropathy ([address]   (+) Uncontrolled type 2 diabetes mellitus with complication, with long-term current use of insulin (HCC)      HEMATOLOGY   (+) Anemia       Anesthetic History   No history of anesthetic complications            Review of Systems / Medical History  Patient summary reviewed, nursing notes reviewed and pertinent labs reviewed    Pulmonary        Sleep apnea: CPAP  Shortness of breath  Asthma     Comments: Former smoker - 75 pack years   Neuro/Psych   Within defined limits          Comments: Hx Cervical Spondylosis with myelopathy/Cervicalgia s/p C3-C6 ACDF (5/21/19) Cardiovascular    Hypertension      CHF    CAD and hyperlipidemia    Exercise tolerance: <4 METS  Comments: Carotid Stenosis s/p Carotid Stent    ECG (12/14/21): Sinus tachycardia   Left axis deviation   Low voltage QRS   Cannot rule out Anterior infarct , age undetermined   No previous ECGs available     EF of 55 - 60%.  Mild AS   GI/Hepatic/Renal         Renal disease: stones      Comments: Melena/GI Bleed Endo/Other    Diabetes: type 2, using insulin    Morbid obesity and arthritis     Other Findings   Comments: Glaucoma    S/P cervical spinal fusion            Physical Exam    Airway  Mallampati: III  TM Distance: > 6 cm  Neck ROM: normal range of motion   Mouth opening: Normal     Cardiovascular  Regular rate and rhythm,  S1 and S2 normal,  no murmur, click, rub, or gallop             Dental  No notable dental hx       Pulmonary  Breath sounds clear to auscultation               Abdominal  GI exam deferred       Other Findings            Anesthetic Plan    ASA: 3  Anesthesia type: MAC          Induction: Intravenous  Anesthetic plan and risks discussed with: Patient      Supernova
no

## 2021-12-22 ENCOUNTER — APPOINTMENT (OUTPATIENT)
Dept: PULMONOLOGY | Facility: CLINIC | Age: 66
End: 2021-12-22
Payer: MEDICARE

## 2021-12-30 PROCEDURE — 86036 ANCA SCREEN EACH ANTIBODY: CPT

## 2021-12-30 PROCEDURE — 85730 THROMBOPLASTIN TIME PARTIAL: CPT

## 2021-12-30 PROCEDURE — 82962 GLUCOSE BLOOD TEST: CPT

## 2021-12-30 PROCEDURE — 84480 ASSAY TRIIODOTHYRONINE (T3): CPT

## 2021-12-30 PROCEDURE — 84484 ASSAY OF TROPONIN QUANT: CPT

## 2021-12-30 PROCEDURE — 82550 ASSAY OF CK (CPK): CPT

## 2021-12-30 PROCEDURE — 99285 EMERGENCY DEPT VISIT HI MDM: CPT | Mod: 25

## 2021-12-30 PROCEDURE — 76700 US EXAM ABDOM COMPLETE: CPT

## 2021-12-30 PROCEDURE — 83735 ASSAY OF MAGNESIUM: CPT

## 2021-12-30 PROCEDURE — 85025 COMPLETE CBC W/AUTO DIFF WBC: CPT

## 2021-12-30 PROCEDURE — 80076 HEPATIC FUNCTION PANEL: CPT

## 2021-12-30 PROCEDURE — 86235 NUCLEAR ANTIGEN ANTIBODY: CPT

## 2021-12-30 PROCEDURE — 84481 FREE ASSAY (FT-3): CPT

## 2021-12-30 PROCEDURE — 80048 BASIC METABOLIC PNL TOTAL CA: CPT

## 2021-12-30 PROCEDURE — 80053 COMPREHEN METABOLIC PANEL: CPT

## 2021-12-30 PROCEDURE — C1769: CPT

## 2021-12-30 PROCEDURE — 80061 LIPID PANEL: CPT

## 2021-12-30 PROCEDURE — 86431 RHEUMATOID FACTOR QUANT: CPT

## 2021-12-30 PROCEDURE — 71250 CT THORAX DX C-: CPT

## 2021-12-30 PROCEDURE — A9567: CPT

## 2021-12-30 PROCEDURE — 97162 PT EVAL MOD COMPLEX 30 MIN: CPT

## 2021-12-30 PROCEDURE — 84100 ASSAY OF PHOSPHORUS: CPT

## 2021-12-30 PROCEDURE — U0003: CPT

## 2021-12-30 PROCEDURE — 84439 ASSAY OF FREE THYROXINE: CPT

## 2021-12-30 PROCEDURE — 87635 SARS-COV-2 COVID-19 AMP PRB: CPT

## 2021-12-30 PROCEDURE — 84436 ASSAY OF TOTAL THYROXINE: CPT

## 2021-12-30 PROCEDURE — C1894: CPT

## 2021-12-30 PROCEDURE — 86803 HEPATITIS C AB TEST: CPT

## 2021-12-30 PROCEDURE — 85379 FIBRIN DEGRADATION QUANT: CPT

## 2021-12-30 PROCEDURE — 87389 HIV-1 AG W/HIV-1&-2 AB AG IA: CPT

## 2021-12-30 PROCEDURE — 71045 X-RAY EXAM CHEST 1 VIEW: CPT

## 2021-12-30 PROCEDURE — A9540: CPT

## 2021-12-30 PROCEDURE — 93005 ELECTROCARDIOGRAM TRACING: CPT

## 2021-12-30 PROCEDURE — 78582 LUNG VENTILAT&PERFUS IMAGING: CPT

## 2021-12-30 PROCEDURE — 85610 PROTHROMBIN TIME: CPT

## 2021-12-30 PROCEDURE — U0005: CPT

## 2021-12-30 PROCEDURE — 97116 GAIT TRAINING THERAPY: CPT

## 2021-12-30 PROCEDURE — C1887: CPT

## 2021-12-30 PROCEDURE — 84443 ASSAY THYROID STIM HORMONE: CPT

## 2021-12-30 PROCEDURE — 85027 COMPLETE CBC AUTOMATED: CPT

## 2021-12-30 PROCEDURE — 83036 HEMOGLOBIN GLYCOSYLATED A1C: CPT

## 2021-12-30 PROCEDURE — 82553 CREATINE MB FRACTION: CPT

## 2021-12-30 PROCEDURE — 36415 COLL VENOUS BLD VENIPUNCTURE: CPT

## 2021-12-30 PROCEDURE — 83880 ASSAY OF NATRIURETIC PEPTIDE: CPT

## 2021-12-30 PROCEDURE — 86038 ANTINUCLEAR ANTIBODIES: CPT

## 2021-12-30 PROCEDURE — 93306 TTE W/DOPPLER COMPLETE: CPT

## 2021-12-30 PROCEDURE — 86769 SARS-COV-2 COVID-19 ANTIBODY: CPT

## 2022-01-11 ENCOUNTER — NON-APPOINTMENT (OUTPATIENT)
Age: 67
End: 2022-01-11

## 2022-01-12 ENCOUNTER — NON-APPOINTMENT (OUTPATIENT)
Age: 67
End: 2022-01-12

## 2022-01-28 ENCOUNTER — NON-APPOINTMENT (OUTPATIENT)
Age: 67
End: 2022-01-28

## 2022-02-01 ENCOUNTER — NON-APPOINTMENT (OUTPATIENT)
Age: 67
End: 2022-02-01

## 2022-02-01 LAB — SARS-COV-2 N GENE NPH QL NAA+PROBE: NOT DETECTED

## 2022-02-02 ENCOUNTER — NON-APPOINTMENT (OUTPATIENT)
Age: 67
End: 2022-02-02

## 2022-02-02 ENCOUNTER — APPOINTMENT (OUTPATIENT)
Dept: PULMONOLOGY | Facility: CLINIC | Age: 67
End: 2022-02-02
Payer: MEDICARE

## 2022-02-02 VITALS
BODY MASS INDEX: 37.56 KG/M2 | HEART RATE: 83 BPM | DIASTOLIC BLOOD PRESSURE: 81 MMHG | HEIGHT: 64 IN | SYSTOLIC BLOOD PRESSURE: 122 MMHG | TEMPERATURE: 97.6 F | WEIGHT: 220 LBS | OXYGEN SATURATION: 95 %

## 2022-02-02 DIAGNOSIS — R07.9 CHEST PAIN, UNSPECIFIED: ICD-10-CM

## 2022-02-02 PROCEDURE — ZZZZZ: CPT

## 2022-02-02 PROCEDURE — 99215 OFFICE O/P EST HI 40 MIN: CPT

## 2022-02-03 LAB
ALBUMIN SERPL ELPH-MCNC: 4.2 G/DL
ALP BLD-CCNC: 71 U/L
ALT SERPL-CCNC: 23 U/L
ANION GAP SERPL CALC-SCNC: 13 MMOL/L
AST SERPL-CCNC: 20 U/L
BILIRUB SERPL-MCNC: 0.3 MG/DL
BUN SERPL-MCNC: 18 MG/DL
CALCIUM SERPL-MCNC: 11.1 MG/DL
CHLORIDE SERPL-SCNC: 104 MMOL/L
CK SERPL-CCNC: 146 U/L
CO2 SERPL-SCNC: 24 MMOL/L
CREAT SERPL-MCNC: 0.89 MG/DL
GLUCOSE SERPL-MCNC: 87 MG/DL
NT-PROBNP SERPL-MCNC: 274 PG/ML
POTASSIUM SERPL-SCNC: 3.9 MMOL/L
PROT SERPL-MCNC: 7 G/DL
SODIUM SERPL-SCNC: 140 MMOL/L
TROPONIN I SERPL-MCNC: 0.01 NG/ML

## 2022-02-03 NOTE — PROCEDURE
[FreeTextEntry1] : 2/22/22 PFT and 6MWT\par \par *****\par 11/3/21 ATTENDED SLEEP STUDY\par AHI AASM 42.6   AHI CMS 35.2\par Mean SpO2 93% with minimum SpO2 77%. SpO2 <= 88% for 9.2 minutes  APRIL 20.8\par *****\par 10/26/21 LABS\par Pro BNP  925\par *****\par \par 10/8/21 CT CHEST\par Bilateral air trapping. Considerations include constrictive bronchiolitis and HP.\par *****\par 10/8/21 V/Q scan\par There is no evidence of a segmental or subsegmental perfusion defect to suggest pulmonary embolism. Ventilation imaging is suboptimal.\par Impression: No evidence of pulmonary embolism.\par *****\par 10/7/21 LHC & RHC\par Non-obstructive CAD, normal LV function\par RAP 9\par PAP 95/35 (55)\par PAWP 10\par Thermodilution CO/CI 3.31/1.66  Scar (assumed VO@ 296.87) 6.44/3.23\par PVR (thermodilution) 14 FREDERICK\par \par Wilver\par PAP 82/35 (47)\par PAWP 10\par Thermodilution CO/CI 3.45/1.72  Scar 5.27/2.65\par PVR (thermodilution) 10.7 FREDERICK\par \par Consistent with severe precapillary pulmonary arterial hypertension and low CO/CI. Of note, significant discordance between Scar and thermodilution CO/CI> Assumed SCAR VO2 is 262.87 which is likely overestimated and falsely increasing CO/CI. In absence of severe TR or L -> shunt would favor thermodilution. \par *****\par 10/6/21 ECHOCARDIOGRAM\par  1. Normal left ventricular size and systolic function.\par  2. Dilated right ventricular size.\par  3. Mildly reduced right ventricular systolic function.\par  4. Dilated right atrium.\par  5. Mild tricuspid regurgitation.\par  6. No other significant valvular disease.\par  7. Pulmonary hypertension present, pulmonary artery systolic pressure is 101 mmHg.\par  8. No pericardial effusion.\par  9. No prior echo is available for comparison.\par TAPSE 20  RV S’ 8\par

## 2022-02-03 NOTE — PHYSICAL EXAM
[No Acc Muscle Use] : no acc muscle use [1+ Pitting] : 1+ pitting [TextBox_54] : reproducible chest pain over L costochondral junction

## 2022-02-03 NOTE — ASSESSMENT
[FreeTextEntry1] : 65 yo F with hx of asthma, HTN, HPL, DM presented to [facility] with SOB, found to have severe precapillary PAH with PASP 100mmHg on TTE and dilated RV with mildly reduced function\par \par 1. Pulmonary Hypertension - WHO Group I PAH RHC 10/7/21 with mPAP 55, PAWP 10, CO/CI 3.31/1.66, PVR 14WU. Suspect 2/2 idiopathic PAH and started on dual therapy, although sleep study demonstrated severe RG which is likely contributing (WHO Group II). Rheum studies negative (ALEJANDRO, RF, Scleroderma ab, complement), US abdomen without evidence of cirrhosis or portal hypertension, V/Q negative for CTEPH, HIV negative, LHC without CAD and TTE with normal LV size/function, no valvular disease. CT chest with mosaicism, otherwise no fibrosis or significant parenchymal disease. Has history of asthma and CT suggestive of bronchiolitis or airways disease, but does not explain severity of PH. Started on dual oral therapy, sildenafil and macitentan, and tolerating therapy well with already some improvement in exercise tolerance. Today significant lower extremity edema and her weight is up 5 pounds since her last office visit, worsening SOB. \par \par Plan:\par -start furosemide 20 mg daily, re-check labs and edema in 1 week\par -instructed to call if gains 2 lbs in 24 hours or 5 pounds in 1 week: \par -pro BNP today to trend. Also do CMP to look at LFTs since she will be getting Tylenol for costochondritis. \par -PFT and 6MWT (deferred today due to costochondritis)\par -re-refer for pulmonary rehab\par -start using AutoPAP\par -echo after having been on APAP for at least 1 month\par - severity of PAH based on RHC is severe and if persistently symptomatic would usually be considered for triple therapy. However, may be difficult to add third agent given complexity with titration and significant side effects. Will treat secondary causes (severe RG) before increasing vasodilator therapy. \par \par 2. Asthma- Long history of asthma, CT with evidence of mosaicism suggestive of air trapping vs. constrictive bronchiolitis. \par \par Plan\par -continue with home inhalers for now, Advair and prn albuterol\par -pending results of PFTs may consider inspiratory/expiratory noncontrast CT to evaluate mosaicism seen \par -re-refer for pulmonary rehab\par \par 3. Obstructive Sleep Apnea\par Pt had an attended sleep study that showed.: AHI AASM 42.6   AHI CMS 35.2.  Mean SpO2 93% with minimum SpO2 77%. SpO2 <= 88% for 9.2 minutes  APRIL 20.8\par \par Plan:\par -Start Auto Pap  DME [facility] will be delivering equipment soon, [employer]\par -Resvent does not have a modem. Compliance reports will have to be requested from [facility] a week in advance of each visit.\par \par 4. Nocturnal cough - on PPI and Flonase, CT chest with areas of esophageal dilation, high suspicion for GERD. Continue with PPI and elevation of head when sleeping.\par \par 5. Costochondritis\par Pt reports left sided chest pain on inspiration. Pain is reproducible. \par \par Plan:\par -CPK and troponin to r/o cardiac causes (although recent cath demonstrated no obstruction)\par -EKG performed in office is NSR, no ST/TW changes\par -Tylenol 650 mg MARLENE TID as needed for pain, avoid NSAIDs as patient c/o dry nose and nose bleeds\par \par 6. Health maintenance\par -High dose Fluzone administered in Left deltoid 10/2021\par \par RTC ~6 weeksw/ Echocardiogram, PFT, 6MWT and APAP compliance report (need to get from [facility] as her AutoPap machine does not have a modem)

## 2022-02-03 NOTE — HISTORY OF PRESENT ILLNESS
[TextBox_4] : 66 F with developmental disability, HTN, HLD, DM2, asthma, and CAD who presented 10/2021 with progressive worsening GOYAL associated with chest discomfort x1-2 weeks, found to have elevated PASP on ECHO, confirmed severe pre-capillary hypertension via right-heart catheterization (RAP 9 PAP 95/35/55  PAWP 10 Thermodilution CO/CI  3.31/1.66  Chong 6.44/3.23  PVR thermodilution 14 FREDERICK). She does not have LV dysfunction and V/Q scan was negative.  She was started on sildenafil 20 mg TID and Opsumit 10 mg daily. \par \par 2/22/22 - Asked to see pt earlier as she arrived to PFT lab c/o chest pain, PFTs/6MWT cancelled, pt evaluated in clinic. \par \par She has been very short of breath recently. The staff have been increasing the frequency of her albuterol treatments the last few days because she is so SOB. She is being weighed daily. the most she has ever gained is 3 pounds in 1 week. Patient states that she continues to have shortness of breath with walking, that improved slightly with sildenafil but is now worse again. Endorsed L sided anterior chest pain as she walked to clinic. States that the pain is worse with deep breaths. Denies radiation of chest pain to arm or neck. Of note, endorses ongoing nocturnal cough that is only temporarily improved with inhalers. \par \par Verified with nurse that atenolol was stopped. She Is not going for pulmonary rehab because the phone number we had for the patient was wrong and they could not reach her. She went for an attended sleep study but did not get CPAP titration because did not have a recent COVID test. She was ordered APAP and [facility] is working on getting it delivered. \par

## 2022-02-07 LAB — TROPONIN-T, HIGH SENSITIVITY: 13 NG/L

## 2022-02-10 ENCOUNTER — NON-APPOINTMENT (OUTPATIENT)
Age: 67
End: 2022-02-10

## 2022-02-15 ENCOUNTER — NON-APPOINTMENT (OUTPATIENT)
Age: 67
End: 2022-02-15

## 2022-02-15 LAB
ANION GAP SERPL CALC-SCNC: 13 MMOL/L
BUN SERPL-MCNC: 21 MG/DL
CALCIUM SERPL-MCNC: 10.9 MG/DL
CHLORIDE SERPL-SCNC: 102 MMOL/L
CO2 SERPL-SCNC: 28 MMOL/L
CREAT SERPL-MCNC: 0.9 MG/DL
GLUCOSE SERPL-MCNC: 102 MG/DL
NT-PROBNP SERPL-MCNC: 218 PG/ML
POTASSIUM SERPL-SCNC: 4 MMOL/L
SODIUM SERPL-SCNC: 143 MMOL/L

## 2022-02-22 ENCOUNTER — FORM ENCOUNTER (OUTPATIENT)
Age: 67
End: 2022-02-22

## 2022-02-23 ENCOUNTER — OUTPATIENT (OUTPATIENT)
Dept: OUTPATIENT SERVICES | Facility: HOSPITAL | Age: 67
LOS: 1 days | End: 2022-02-23
Payer: MEDICARE

## 2022-02-23 DIAGNOSIS — I27.20 PULMONARY HYPERTENSION, UNSPECIFIED: ICD-10-CM

## 2022-02-23 PROCEDURE — 93306 TTE W/DOPPLER COMPLETE: CPT

## 2022-02-23 PROCEDURE — 93306 TTE W/DOPPLER COMPLETE: CPT | Mod: 26

## 2022-03-16 ENCOUNTER — NON-APPOINTMENT (OUTPATIENT)
Age: 67
End: 2022-03-16

## 2022-03-17 ENCOUNTER — NON-APPOINTMENT (OUTPATIENT)
Age: 67
End: 2022-03-17

## 2022-03-22 ENCOUNTER — NON-APPOINTMENT (OUTPATIENT)
Age: 67
End: 2022-03-22

## 2022-03-22 LAB — SARS-COV-2 N GENE NPH QL NAA+PROBE: NOT DETECTED

## 2022-03-23 ENCOUNTER — NON-APPOINTMENT (OUTPATIENT)
Age: 67
End: 2022-03-23

## 2022-03-23 ENCOUNTER — APPOINTMENT (OUTPATIENT)
Dept: PULMONOLOGY | Facility: CLINIC | Age: 67
End: 2022-03-23
Payer: MEDICARE

## 2022-03-23 VITALS
OXYGEN SATURATION: 95 % | SYSTOLIC BLOOD PRESSURE: 102 MMHG | WEIGHT: 220 LBS | HEART RATE: 74 BPM | HEIGHT: 64 IN | TEMPERATURE: 98.1 F | DIASTOLIC BLOOD PRESSURE: 72 MMHG | BODY MASS INDEX: 37.56 KG/M2

## 2022-03-23 PROCEDURE — 99215 OFFICE O/P EST HI 40 MIN: CPT

## 2022-03-23 PROCEDURE — ZZZZZ: CPT

## 2022-03-29 ENCOUNTER — NON-APPOINTMENT (OUTPATIENT)
Age: 67
End: 2022-03-29

## 2022-03-31 ENCOUNTER — RX RENEWAL (OUTPATIENT)
Age: 67
End: 2022-03-31

## 2022-03-31 NOTE — ASSESSMENT
[FreeTextEntry1] : 67 yo F with hx of asthma, HTN, HPL, DM presented to [facility] with SOB, found to have severe precapillary PAH with PASP 100mmHg on TTE and dilated RV with mildly reduced function\par \par 1. Pulmonary Hypertension - WHO Group I PAH RHC 10/7/21 with mPAP 55, PAWP 10, CO/CI 3.31/1.66, PVR 14WU. Suspect 2/2 idiopathic PAH and started on dual therapy, although sleep study demonstrated severe RG which is likely contributing (WHO Group II). Rheum studies negative (ALEJANDRO, RF, Scleroderma ab, complement), US abdomen without evidence of cirrhosis or portal hypertension, V/Q negative for CTEPH, HIV negative, LHC without CAD and TTE with normal LV size/function, no valvular disease. CT chest with mosaicism, otherwise no fibrosis or significant parenchymal disease. Has history of asthma and CT suggestive of bronchiolitis or airways disease, but does not explain severity of PH. Started on dual oral therapy, sildenafil and macitentan, and tolerating therapy well with already some improvement in exercise tolerance. Today, c/o worsening SOB/wheezing, still with significant lower extremity edema but her compression stockings aren't in proper position\par \par Plan:\par -continue furosemide 20 mg daily\par -instructed to call if gains 2 lbs in 24 hours or 5 pounds in 1 week: \par -re-refer for pulmonary rehab (f/u with [facility] to find out why this was not pursued)\par -mask fitting with sleep center so pt can comfortably use AutoPAP\par - severity of PAH based on RHC is severe and if persistently symptomatic would usually be considered for triple therapy. However, may be difficult to add third agent given complexity with titration and significant side effects. Will treat secondary causes (severe RG) before increasing vasodilator therapy. \par \par 2. Asthma- Long history of asthma, CT with evidence of mosaicism suggestive of air trapping vs. constrictive bronchiolitis. She is reporting productive cough predominantly at night and significantly more wheezing, likely with asthma exacerbation. \par \par Plan\par -continue with home inhalers for now, Advair and prn albuterol -- suggested she increase frequency of albuterol when feeling symptoms flare\par -Start  5 day course of prednisone 40mg/day, will check in on patient in 1 week by phone\par -re-refer for pulmonary rehab\par \par 3. Obstructive Sleep Apnea\par Pt had an attended sleep study that showed.: AHI AASM 42.6   AHI CMS 35.2.  Mean SpO2 93% with minimum SpO2 77%. SpO2 <= 88% for 9.2 minutes  APRIL 20.8\par \par Plan:\par -Auto Pap [facility]\par -Resvent does not have a modem. Compliance reports will have to be requested from [employer] 2 weeks in advance of each visit.\par -needs a mask fitting with the sleep center (Ayan should call [phone] to make an appointment)\par \par 4. Nocturnal cough - on PPI and Flonase, CT chest with areas of esophageal dilation, high suspicion for GERD. Continue with PPI and elevation of head when sleeping.\par \par Plan;\par -short course of Prednisone as above\par \par 5. Health maintenance\par -High dose Fluzone administered in Left deltoid 10/2021\par \par RTC 1 month to see Dr. Shaw (call [phone] to make appointment)

## 2022-03-31 NOTE — PHYSICAL EXAM
[TextBox_2] : tired appearing [TextBox_68] : few scattered end exp wheezes b/l, particularly on forced expiratory maneuvering [TextBox_105] : 1-2+ pitting pedal edema, with element of non-pitting edema more proximally on the pretibial area

## 2022-03-31 NOTE — PROCEDURE
[FreeTextEntry1] : 3/23/22 PFT\par FVC 1.62 (67)\par FEV1 1.39 (73)\par FEV1/FVC 86\par FEF 25-75% 1.84 (104)\par \par Pt had difficulty following PFT instructions\par Unable to obtain DLCO or N2 washout\par *****\par 3/23/22 6MWT\par 92 meters\par Resting Magdiel SOB 2 Magdiel WOB 0\par Resting SpO2 93% on room air HR 88  128/97\par At 3 min 30 sec pt asked to sit and rest c/o dizziness\par After 1 minute rest pt declined to resume 6MWT\par End test SpO2 89% on room air  /72\par Magdiel SOB end of test 6, sever Magdiel WOB 10 very, very severe\par *****\par 3/21/22 COMPLIANCE DATA\par unavailable\par *****\par 2/23/22 ECHO\par  1. Normal left ventricular size and systolic function.\par  2. Dilated right ventricular size.\par  3. Borderline normal right ventricular systolic function.\par  4. Normal atria.\par  5. Aortic sclerosis without significant stenosis.\par  6. No other significant valvular disease.\par  7. Pulmonary hypertension present, pulmonary artery systolic pressure is \par 44 mmHg.\par  8. No pericardial effusion.\par  9. Compared to the previous TTE performed on 10/6/2021, pulmonary artery \par systolic pressures have decreased.\par *****\par 2/14/22  LABS (post Lasix)\par Pro \par Creatinine 0.9\par *****\par 2/2/22\par Pro \par LFTs WNL\par *****\par 11/3/21 ATTENDED SLEEP STUDY\par AHI AASM 42.6   AHI CMS 35.2\par Mean SpO2 93% with minimum SpO2 77%. SpO2 <= 88% for 9.2 minutes  APRIL 20.8\par *****\par 10/26/21 LABS\par Pro BNP  925\par *****\par \par 10/8/21 CT CHEST\par Bilateral air trapping. Considerations include constrictive bronchiolitis and HP.\par *****\par 10/8/21 V/Q scan\par There is no evidence of a segmental or subsegmental perfusion defect to suggest pulmonary embolism. Ventilation imaging is suboptimal.\par Impression: No evidence of pulmonary embolism.\par *****\par 10/7/21 LHC & RHC\par Non-obstructive CAD, normal LV function\par RAP 9\par PAP 95/35 (55)\par PAWP 10\par Thermodilution CO/CI 3.31/1.66  Scar (assumed VO@ 296.87) 6.44/3.23\par PVR (thermodilution) 14 FREDERICK\par \par Wilver\par PAP 82/35 (47)\par PAWP 10\par Thermodilution CO/CI 3.45/1.72  Scar 5.27/2.65\par PVR (thermodilution) 10.7 FREDERICK\par \par Consistent with severe precapillary pulmonary arterial hypertension and low CO/CI. Of note, significant discordance between Scar and thermodilution CO/CI> Assumed SCAR VO2 is 262.87 which is likely overestimated and falsely increasing CO/CI. In absence of severe TR or L -> shunt would favor thermodilution. \par *****\par 10/6/21 ECHOCARDIOGRAM\par  1. Normal left ventricular size and systolic function.\par  2. Dilated right ventricular size.\par  3. Mildly reduced right ventricular systolic function.\par  4. Dilated right atrium.\par  5. Mild tricuspid regurgitation.\par  6. No other significant valvular disease.\par  7. Pulmonary hypertension present, pulmonary artery systolic pressure is 101 mmHg.\par  8. No pericardial effusion.\par  9. No prior echo is available for comparison.\par TAPSE 20  RV S’ 8\par

## 2022-03-31 NOTE — HISTORY OF PRESENT ILLNESS
[TextBox_4] : 66 F with developmental disability, HTN, HLD, DM2, asthma, and CAD who presented 10/2021 with progressive worsening GOYAL associated with chest discomfort x1-2 weeks, found to have elevated PASP on ECHO, confirmed severe pre-capillary hypertension via right-heart catheterization (RAP 9 PAP 95/35/55  PAWP 10 Thermodilution CO/CI  3.31/1.66  Chong 6.44/3.23  PVR thermodilution 14 FREDERICK). She does not have LV dysfunction and V/Q scan was negative.  She was started on sildenafil 20 mg TID and Opsumit 10 mg daily. \par \par 3/23/22:\par Feeling more dizzy than she usually does for the past week and feels her asthma has been acting up with the changes in weather. Using albuterol 3x a day which she says is less often than she historically has needed but says her wheezing has been more. Taking her other medications as scheduled, and does the lasix around 3pm when she finishes her daily activities. Feels her dizziness tends to be most pronounced at night. Has had coughing that is also more pronounced at night. Bringing up a lot of while phlegm. Also says her legs seem more swollen than they usually do, particularly compared to when she first started on the lasix. No changes in measured weights, however. She also reports a fall while leaving her group session a few week ago requiring ED visit for eval. \par \par

## 2022-03-31 NOTE — END OF VISIT
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 Had felt initial improvement on PAH therapy and diuretics, worsening SOB and cough in the last week more likely related to asthma. Complained of leg swelling, but actually similar to prior, exacerbated by poorly fitting compression stockings - advised to change size. Will follow up closely after prednisone course - telephone in 1 week and RTC in 1 month. Most be compliant on CPAP therapy prior to increasing PAH therapy, needs mask fitting appointment given difficulties wearing mask.

## 2022-04-05 ENCOUNTER — RX RENEWAL (OUTPATIENT)
Age: 67
End: 2022-04-05

## 2022-04-11 PROBLEM — Z11.59 SCREENING FOR VIRAL DISEASE: Status: ACTIVE | Noted: 2021-10-26

## 2022-04-19 ENCOUNTER — NON-APPOINTMENT (OUTPATIENT)
Age: 67
End: 2022-04-19

## 2022-04-19 NOTE — ED ADULT TRIAGE NOTE - DOMESTIC TRAVEL HIGH RISK QUESTION
Pre-op Diagnosis: Arthritis of left knee [M17.12]    The above referenced H&P was reviewed by Onel Funk MD on 4/19/2022, the patient was examined and no significant changes have occurred in the patient's condition since the H&P was performed. I discussed with the patient and/or legal representative the potential benefits, risks and side effects of this procedure; the likelihood of the patient achieving goals; and potential problems that might occur during recuperation. I discussed reasonable alternatives to the procedure, including risks, benefits and side effects related to the alternatives and risks related to not receiving this procedure. We will proceed with procedure as planned.
No

## 2022-04-27 ENCOUNTER — NON-APPOINTMENT (OUTPATIENT)
Age: 67
End: 2022-04-27

## 2022-05-23 ENCOUNTER — NON-APPOINTMENT (OUTPATIENT)
Age: 67
End: 2022-05-23

## 2022-06-01 ENCOUNTER — APPOINTMENT (OUTPATIENT)
Dept: PULMONOLOGY | Facility: CLINIC | Age: 67
End: 2022-06-01

## 2022-06-01 ENCOUNTER — NON-APPOINTMENT (OUTPATIENT)
Age: 67
End: 2022-06-01

## 2022-06-15 ENCOUNTER — NON-APPOINTMENT (OUTPATIENT)
Age: 67
End: 2022-06-15

## 2022-06-15 ENCOUNTER — APPOINTMENT (OUTPATIENT)
Dept: PULMONOLOGY | Facility: CLINIC | Age: 67
End: 2022-06-15
Payer: MEDICARE

## 2022-06-15 VITALS
DIASTOLIC BLOOD PRESSURE: 62 MMHG | WEIGHT: 216 LBS | OXYGEN SATURATION: 94 % | SYSTOLIC BLOOD PRESSURE: 111 MMHG | HEART RATE: 89 BPM | BODY MASS INDEX: 36.88 KG/M2 | TEMPERATURE: 97.8 F | HEIGHT: 64 IN

## 2022-06-15 PROCEDURE — 99214 OFFICE O/P EST MOD 30 MIN: CPT

## 2022-06-15 RX ORDER — DAPAGLIFLOZIN 10 MG/1
10 TABLET, FILM COATED ORAL DAILY
Qty: 30 | Refills: 6 | Status: DISCONTINUED | COMMUNITY
Start: 2022-06-15 | End: 2022-06-15

## 2022-06-15 NOTE — HISTORY OF PRESENT ILLNESS
[TextBox_4] : 66 F with developmental disability, HTN, HLD, DM2, asthma, and CAD who presented 10/2021 with progressive worsening GOYAL associated with chest discomfort x1-2 weeks, found to have elevated PASP on ECHO, confirmed severe pre-capillary hypertension via right-heart catheterization (RAP 9 PAP 95/35/55  PAWP 10 Thermodilution CO/CI  3.31/1.66  Chong 6.44/3.23  PVR thermodilution 14 FREDERICK). She does not have LV dysfunction and V/Q scan was negative.  She was started on sildenafil 20 mg TID and Opsumit 10 mg daily.  She was found to have sleep apnea and started on Auto PaP. At the last visit she was c/o more dizziness and SOB associated with changes in the weather requiring increased albuterol usage and a/w productive cough. Faint expiratory wheezing B/L heard, started on prednisone x 5 days for asthma exacerbation. Had significant improvement in cough and SOB on telephone follow up. \par \par \par 6-15-22\par She has not been able to go for pulmonary rehab because she is getting PT for an ortho issues however they cannot provide pulmonary rehab. She refused to go to her CPAP mask fitting but has been complaining about it less since there was a change in the nighttime staff. She has not been complaining of cough or SOB. \par There was an issue with the Auto PAP SD card and unclear if the data is accurate. Only showed data for 30 days and pt has been using it since 3/11/22. Eladio from [facility] is trying to get the SD card replaced or even get her a modem\par Using her albuterol every morning and every night bc of SOB, although says she can walk 5 blocks before stopping (prior was 2). Has chronic LE swelling and using compression stockings. + cough worse at night\par No chest pain, + acid reflux pain. Doing PT every Monday and tolerance of PT improving. Using CPAP, thinks she sleeps better\par \par  \par \par

## 2022-06-15 NOTE — ASSESSMENT
[FreeTextEntry1] : 68 yo F with hx of asthma, HTN, HPL, DM presented to [facility] with SOB, found to have severe precapillary PAH with PASP 100mmHg on TTE and dilated RV with mildly reduced function\par \par 1. Pulmonary Hypertension - WHO Group I PAH RHC 10/7/21 with mPAP 55, PAWP 10, CO/CI 3.31/1.66, PVR 14WU. Suspect 2/2 idiopathic PAH and started on dual therapy, although sleep study demonstrated severe RG which is likely contributing (WHO Group II). Rheum studies negative (ALEJANDRO, RF, Scleroderma ab, complement), US abdomen without evidence of cirrhosis or portal hypertension, V/Q negative for CTEPH, HIV negative, LHC without CAD and TTE with normal LV size/function, no valvular disease. CT chest with mosaicism, otherwise no fibrosis or significant parenchymal disease. Has history of asthma and CT suggestive of bronchiolitis or airways disease, but does not explain severity of PH. Started on dual oral therapy, sildenafil and macitentan, and tolerating therapy well with already some improvement in exercise tolerance. Given hx of DM, obesity, RG, will repeat Hgb A1c and start Farxiga or Jardiance - pending insurance approval (10/2021 a1c 6.3%). BP is normal/borderline low, will stop HCTZ with the start of SGLT2 inh\par \par Plan:\par -continue furosemide 20 mg daily\par -instructed to call if gains 2 lbs in 24 hours or 5 pounds in 1 week\par - start Farxiga vs Jardiance, STOP hydrochlorathiazide  \par -attempt to get pulmonary rehab (f/u with [facility] to find out how they can get around the double billing issue)\par - Management of RG as below\par - severity of PAH based on RHC is severe and if persistently symptomatic would usually be considered for triple therapy. However, may be difficult to add third agent given complexity with titration and significant side effects. Will treat secondary causes (severe RG) before increasing vasodilator therapy. \par - given borderline BP and consideration of SGLT2 inh will d/c hydrochlorathiazide\par - Trend NTproBNP, check CBC and CMP given ERA medication\par - Repeat TTE in August, RTC in Sept\par \par 2. Asthma- Long history of asthma, CT with evidence of mosaicism suggestive of air trapping vs. constrictive bronchiolitis. \par \par Plan\par -continue with home inhalers for now, Advair and prn albuterol -- will increase advair to higher dose and reevaluate given frequent use of albuterol (morning and evening daily)\par \par 3. Obstructive Sleep Apnea\par Pt had an attended sleep study that showed.: AHI AASM 42.6   AHI CMS 35.2.  Mean SpO2 93% with minimum SpO2 77%. SpO2 <= 88% for 9.2 minutes  APRIL 20.8\par \par Plan:\par -Auto Pap [facility]\par -[facility] does not have a modem. Compliance reports will have to be requested from [employer] 2 weeks in advance of each visit. Per group home she has been placed on BiPAP every night\par -Was a no show to the mask fitting appointment, but no longer complaining to staff about CPAP\par -Repeat TTE to reevaluate PH now that she is on CPAP therapy\par \par 4. Nocturnal cough - on PPI and Flonase, CT chest with areas of esophageal dilation, high suspicion for GERD. Continue with PPI and elevation of head when sleeping.\par \par \par 5. Health maintenance\par -Received COVID and flu vaccine\par \par RTC 3 months for 6MWT, TTE, clinic visit

## 2022-06-15 NOTE — PHYSICAL EXAM
[TextBox_105] : trace pitting pedal edema, with element of non-pitting edema more proximally on the pretibial area

## 2022-06-16 LAB
ALBUMIN SERPL ELPH-MCNC: 4.1 G/DL
ALP BLD-CCNC: 66 U/L
ALT SERPL-CCNC: 18 U/L
ANION GAP SERPL CALC-SCNC: 15 MMOL/L
AST SERPL-CCNC: 18 U/L
BASOPHILS # BLD AUTO: 0.02 K/UL
BASOPHILS NFR BLD AUTO: 0.4 %
BILIRUB SERPL-MCNC: 0.2 MG/DL
BUN SERPL-MCNC: 14 MG/DL
CALCIUM SERPL-MCNC: 10.4 MG/DL
CHLORIDE SERPL-SCNC: 100 MMOL/L
CO2 SERPL-SCNC: 25 MMOL/L
CREAT SERPL-MCNC: 0.92 MG/DL
EGFR: 67 ML/MIN/1.73M2
EOSINOPHIL # BLD AUTO: 0.1 K/UL
EOSINOPHIL NFR BLD AUTO: 2.1 %
ESTIMATED AVERAGE GLUCOSE: 134 MG/DL
GLUCOSE SERPL-MCNC: 120 MG/DL
HBA1C MFR BLD HPLC: 6.3 %
HCT VFR BLD CALC: 33.3 %
HGB BLD-MCNC: 10.2 G/DL
IMM GRANULOCYTES NFR BLD AUTO: 0.2 %
LYMPHOCYTES # BLD AUTO: 1.49 K/UL
LYMPHOCYTES NFR BLD AUTO: 31.4 %
MAN DIFF?: NORMAL
MCHC RBC-ENTMCNC: 24.9 PG
MCHC RBC-ENTMCNC: 30.6 GM/DL
MCV RBC AUTO: 81.2 FL
MONOCYTES # BLD AUTO: 0.32 K/UL
MONOCYTES NFR BLD AUTO: 6.7 %
NEUTROPHILS # BLD AUTO: 2.81 K/UL
NEUTROPHILS NFR BLD AUTO: 59.2 %
NT-PROBNP SERPL-MCNC: 191 PG/ML
PLATELET # BLD AUTO: 265 K/UL
POTASSIUM SERPL-SCNC: 3.4 MMOL/L
PROT SERPL-MCNC: 6.5 G/DL
RBC # BLD: 4.1 M/UL
RBC # FLD: 16.5 %
SODIUM SERPL-SCNC: 139 MMOL/L
WBC # FLD AUTO: 4.75 K/UL

## 2022-06-16 RX ORDER — HYDROCHLOROTHIAZIDE 25 MG/1
25 TABLET ORAL
Refills: 0 | Status: DISCONTINUED | COMMUNITY
End: 2022-06-16

## 2022-06-22 ENCOUNTER — NON-APPOINTMENT (OUTPATIENT)
Age: 67
End: 2022-06-22

## 2022-07-13 ENCOUNTER — FORM ENCOUNTER (OUTPATIENT)
Age: 67
End: 2022-07-13

## 2022-07-14 ENCOUNTER — OUTPATIENT (OUTPATIENT)
Dept: OUTPATIENT SERVICES | Facility: HOSPITAL | Age: 67
LOS: 1 days | End: 2022-07-14
Payer: MEDICARE

## 2022-07-14 DIAGNOSIS — I27.20 PULMONARY HYPERTENSION, UNSPECIFIED: ICD-10-CM

## 2022-07-14 PROCEDURE — 93306 TTE W/DOPPLER COMPLETE: CPT | Mod: 26

## 2022-07-14 PROCEDURE — 93306 TTE W/DOPPLER COMPLETE: CPT

## 2022-07-24 ENCOUNTER — NON-APPOINTMENT (OUTPATIENT)
Age: 67
End: 2022-07-24

## 2022-07-25 ENCOUNTER — NON-APPOINTMENT (OUTPATIENT)
Age: 67
End: 2022-07-25

## 2022-07-28 RX ORDER — ACETAMINOPHEN 325 MG/1
325 TABLET ORAL EVERY 8 HOURS
Qty: 180 | Refills: 1 | Status: DISCONTINUED | COMMUNITY
Start: 2022-02-02 | End: 2022-07-28

## 2022-08-15 ENCOUNTER — RX RENEWAL (OUTPATIENT)
Age: 67
End: 2022-08-15

## 2022-08-21 ENCOUNTER — NON-APPOINTMENT (OUTPATIENT)
Age: 67
End: 2022-08-21

## 2022-09-21 ENCOUNTER — NON-APPOINTMENT (OUTPATIENT)
Age: 67
End: 2022-09-21

## 2022-09-21 ENCOUNTER — APPOINTMENT (OUTPATIENT)
Dept: PULMONOLOGY | Facility: CLINIC | Age: 67
End: 2022-09-21

## 2022-09-21 VITALS
TEMPERATURE: 98.2 F | BODY MASS INDEX: 36.19 KG/M2 | WEIGHT: 212 LBS | HEART RATE: 86 BPM | OXYGEN SATURATION: 93 % | HEIGHT: 64 IN | DIASTOLIC BLOOD PRESSURE: 80 MMHG | SYSTOLIC BLOOD PRESSURE: 138 MMHG

## 2022-09-21 PROCEDURE — ZZZZZ: CPT

## 2022-09-21 PROCEDURE — 94618 PULMONARY STRESS TESTING: CPT

## 2022-09-21 PROCEDURE — 99214 OFFICE O/P EST MOD 30 MIN: CPT | Mod: 25

## 2022-09-22 NOTE — ADDENDUM
[FreeTextEntry1] : Pt has been non compliant for the 90 day compliance period designated by Medicare. Pt cannot obtain nasal pillows. She will need a new attended sleep study to re-qualify for auto PAP. Order entered for a split night study. Requested they try nasal pillows instead of full face mask during study.

## 2022-09-22 NOTE — PROCEDURE
[FreeTextEntry1] : 9/21/22 6MWT\par 110 meters\par Resting SpO2 94% on room air\par with 3 min 15 sec reamining SpO2 90%\par Pt ended test due to fatigue\par End test SpO2 90% on room air\par *****\par 8/2/22 APAP Compliance Report\par 6/22 days (27.3%)\par Avg usage 1 hour 13 minutes\par 0 days with usage > 4 hours\par Mean pressure 12 mmHg\par *****\par 7/14/22 ECHO\par 1. Normal left ventricular size and systolic function.\par  2. Normal right ventricular size and systolic function.\par  3. No significant valvular disease.\par  4. Pulmonary hypertension present, pulmonary artery systolic pressure is 38 mmHg.\par  5. No pericardial effusion.\par  6. Compared to the previous TTE performed on 2/23/2022, right ventricle appears normal in size and function.\par *****\par 6/15/22\par Hbg 10.2\par NT pro \par LFTs WNL\par Potassium 3.4\par *****\par 3/23/22 PFT\par FVC 1.62 (67)\par FEV1 1.39 (73)\par FEV1/FVC 86\par FEF 25-75% 1.84 (104)\par \par Pt had difficulty following PFT instructions\par Unable to obtain DLCO or N2 washout\par *****\par 3/23/22 6MWT\par 92 meters\par Resting Magdiel SOB 2 Magdiel WOB 0\par Resting SpO2 93% on room air HR 88  128/97\par At 3 min 30 sec pt asked to sit and rest c/o dizziness\par After 1 minute rest pt declined to resume 6MWT\par End test SpO2 89% on room air  /72\par Magdiel SOB end of test 6, sever Magdiel WOB 10 very, very severe\par *****\par 3/21/22 COMPLIANCE DATA\par unavailable\par *****\par 2/23/22 ECHO\par  1. Normal left ventricular size and systolic function.\par  2. Dilated right ventricular size.\par  3. Borderline normal right ventricular systolic function.\par  4. Normal atria.\par  5. Aortic sclerosis without significant stenosis.\par  6. No other significant valvular disease.\par  7. Pulmonary hypertension present, pulmonary artery systolic pressure is \par 44 mmHg.\par  8. No pericardial effusion.\par  9. Compared to the previous TTE performed on 10/6/2021, pulmonary artery \par systolic pressures have decreased.\par *****\par 2/14/22  LABS (post Lasix)\par Pro \par Creatinine 0.9\par *****\par 2/2/22\par Pro \par LFTs WNL\par *****\par 11/3/21 ATTENDED SLEEP STUDY\par AHI AASM 42.6   AHI CMS 35.2\par Mean SpO2 93% with minimum SpO2 77%. SpO2 <= 88% for 9.2 minutes  APRIL 20.8\par *****\par 10/26/21 LABS\par Pro BNP  925\par *****\par \par 10/8/21 CT CHEST\par Bilateral air trapping. Considerations include constrictive bronchiolitis and HP.\par *****\par 10/8/21 V/Q scan\par There is no evidence of a segmental or subsegmental perfusion defect to suggest pulmonary embolism. Ventilation imaging is suboptimal.\par Impression: No evidence of pulmonary embolism.\par *****\par 10/7/21 LHC & RHC\par Non-obstructive CAD, normal LV function\par RAP 9\par PAP 95/35 (55)\par PAWP 10\par Thermodilution CO/CI 3.31/1.66  Scar (assumed VO@ 296.87) 6.44/3.23\par PVR (thermodilution) 14 FREDERICK\par \par Wilver\par PAP 82/35 (47)\par PAWP 10\par Thermodilution CO/CI 3.45/1.72  Scar 5.27/2.65\par PVR (thermodilution) 10.7 FREDERICK\par \par Consistent with severe precapillary pulmonary arterial hypertension and low CO/CI. Of note, significant discordance between Scar and thermodilution CO/CI> Assumed SCAR VO2 is 262.87 which is likely overestimated and falsely increasing CO/CI. In absence of severe TR or L -> shunt would favor thermodilution. \par *****\par 10/6/21 ECHOCARDIOGRAM\par  1. Normal left ventricular size and systolic function.\par  2. Dilated right ventricular size.\par  3. Mildly reduced right ventricular systolic function.\par  4. Dilated right atrium.\par  5. Mild tricuspid regurgitation.\par  6. No other significant valvular disease.\par  7. Pulmonary hypertension present, pulmonary artery systolic pressure is 101 mmHg.\par  8. No pericardial effusion.\par  9. No prior echo is available for comparison.\par TAPSE 20  RV S’ 8\par

## 2022-09-22 NOTE — ASSESSMENT
[FreeTextEntry1] : 66 yo F with hx of asthma, HTN, HPL, DM presented to [facility] with SOB, found to have severe precapillary PAH with PASP 100mmHg on TTE and dilated RV with mildly reduced function\par \par 1. Pulmonary Hypertension - WHO Group I PAH RHC 10/7/21 with mPAP 55, PAWP 10, CO/CI 3.31/1.66, PVR 14WU. Suspect 2/2 idiopathic PAH and started on dual therapy, although sleep study demonstrated severe RG which is likely contributing (WHO Group II) but does not explain severity of disease. Rheum studies negative (ALJEANDRO, RF, Scleroderma ab, complement), US abdomen without evidence of cirrhosis or portal hypertension, V/Q negative for CTEPH, HIV negative, LHC without CAD and TTE with normal LV size/function, no valvular disease. CT chest with mosaicism, otherwise no fibrosis or significant parenchymal disease. Has history of asthma and CT suggestive of bronchiolitis or airways disease. Started on dual oral therapy, sildenafil and macitentan, and tolerating therapy well with already some improvement in exercise tolerance and significant improvement on TTE (RVSP 38 w/ normal RV size/function) 6MWT today improved by 18 meters, still decreased (110m), but she is limited by knee pain. Also started on Jardiance given hx of DM, obesity, RG, stopped HCTZ given borderline BP.\par \par Plan:\par -continue furosemide 20 mg daily, Jardiance 10mg\par -instructed to call if gains 2 lbs in 24 hours or 5 pounds in 1 week\par -start  pulmonary rehab 9/28\par - Management of RG as below\par - severity of PAH based on RHC is severe and if persistently symptomatic would usually be considered for triple therapy. However, may be difficult to add third agent given complexity with titration and significant side effects. She has had a profound response to dual oral therapy, SGLT2 inh, and CPAP treatment. Continue to monitor for now\par - Trend NTproBNP, check CBC and CMP given ERA medication at next visit\par \par \par 2. Asthma- Long history of asthma, CT with evidence of mosaicism suggestive of air trapping vs. constrictive bronchiolitis. \par \par Plan\par -continue with home inhalers for now, Advair and prn albuterol --  increased advair to higher dose last visit given frequent use of albuterol (morning and evening daily), now significantly improved and rarely needing rescue inhaler\par \par 3. Obstructive Sleep Apnea\par Pt had an attended sleep study that showed.: AHI AASM 42.6   AHI CMS 35.2.  Mean SpO2 93% with minimum SpO2 77%. SpO2 <= 88% for 9.2 minutes  APRIL 20.8\par \par Plan:\par -Auto Pap [employer] [facility]\par -[employer] does not have a modem. Compliance reports are requested from [employer] 2 weeks in advance of each visit. Per group home she has been placed on APAP every night but does not like the mask on her face so pulls it off before she falls asleep so compliance has been 23%. It needs to get up to 70% or the equipment will be taken away. Pt thinks she can be more compliant if she has the nasal pillows instead of full face mask. \par -Contacted DME company [employer]  about obtaining nasal pillows for her instead of full face mask. (faxed in an order)\par \par 4. Nocturnal cough - on PPI and Flonase, CT chest with areas of esophageal dilation, high suspicion for GERD. Continue with PPI and elevation of head when sleeping.\par \par 5. Preoperative Risk Assessment (scheduled for Right TKR 10/6)\par Pt should be extubated to APAP after her surgery and should be on APAP at night during her hospital stay. \par ARISCAT Score for risk of pulmonary complications after surgery: 11 low risk (27 intermediate if surgery is 2-3 hours)\par Fontanez Postop Respiratory Failure risk: 0.6%\par \par 6. Health maintenance\par -Received COVID and flu vaccine\par \par RTC 3 months

## 2022-09-22 NOTE — HISTORY OF PRESENT ILLNESS
[TextBox_4] : 66 F with developmental disability, HTN, HLD, DM2, asthma, and CAD who presented 10/2021 with progressive worsening GOYAL associated with chest discomfort x1-2 weeks, found to have elevated PASP on ECHO, confirmed severe pre-capillary hypertension via right-heart catheterization (RAP 9 PAP 95/35/55  PAWP 10 Thermodilution CO/CI  3.31/1.66  Chong 6.44/3.23  PVR thermodilution 14 FREDERICK). She does not have LV dysfunction and V/Q scan was negative.  She was started on sildenafil 20 mg TID and Opsumit 10 mg daily.  She was found to have sleep apnea and started on Auto PaP. At the last visit she was c/o more dizziness and SOB associated with changes in the weather requiring increased albuterol usage and a/w productive cough. Faint expiratory wheezing B/L heard, started on prednisone x 5 days for asthma exacerbation. Had significant improvement in cough and SOB on telephone follow up. \par \par 9-21-22\par She started on Jardiance in June\par She will start pulmonary rehab 9/28\par She is scheduled for a right total knee replacement 10/6 \par Right now she is only 27.3% compliant with CPAP. Staff puts it on her at night but she removes it before she falls asleep because she says she can't fall asleep with anything on her face. [employer] is supposed to be sending another mask for her to try\par She is not c/o SOB. Dizziness singificantly improved, but has noticed intermittent pain in her chest at night\par She has lost weight, 4 pounds. She has been watching what she eats\par Still has swelling in her legs. Wears her compression stockings\par

## 2022-09-28 ENCOUNTER — RX RENEWAL (OUTPATIENT)
Age: 67
End: 2022-09-28

## 2022-09-28 RX ORDER — FLUTICASONE PROPIONATE AND SALMETEROL XINAFOATE 230; 21 UG/1; UG/1
230-21 AEROSOL, METERED RESPIRATORY (INHALATION) TWICE DAILY
Qty: 10 | Refills: 2 | Status: ACTIVE | COMMUNITY
Start: 2022-09-28 | End: 1900-01-01

## 2022-10-04 ENCOUNTER — RX RENEWAL (OUTPATIENT)
Age: 67
End: 2022-10-04

## 2022-10-06 ENCOUNTER — NON-APPOINTMENT (OUTPATIENT)
Age: 67
End: 2022-10-06

## 2022-10-31 ENCOUNTER — RX RENEWAL (OUTPATIENT)
Age: 67
End: 2022-10-31

## 2022-12-14 ENCOUNTER — NON-APPOINTMENT (OUTPATIENT)
Age: 67
End: 2022-12-14

## 2022-12-14 ENCOUNTER — APPOINTMENT (OUTPATIENT)
Dept: PULMONOLOGY | Facility: CLINIC | Age: 67
End: 2022-12-14

## 2022-12-19 ENCOUNTER — APPOINTMENT (OUTPATIENT)
Dept: PULMONOLOGY | Facility: CLINIC | Age: 67
End: 2022-12-19

## 2022-12-19 VITALS
HEART RATE: 94 BPM | SYSTOLIC BLOOD PRESSURE: 152 MMHG | WEIGHT: 192 LBS | OXYGEN SATURATION: 95 % | BODY MASS INDEX: 32.78 KG/M2 | HEIGHT: 64 IN | TEMPERATURE: 96.6 F | DIASTOLIC BLOOD PRESSURE: 82 MMHG

## 2022-12-19 PROCEDURE — 99214 OFFICE O/P EST MOD 30 MIN: CPT

## 2022-12-19 NOTE — HISTORY OF PRESENT ILLNESS
[TextBox_4] : 66 F with developmental disability, HTN, HLD, DM2, asthma, and CAD who presented 10/2021 with progressive worsening GOYAL associated with chest discomfort x1-2 weeks, found to have elevated PASP on ECHO, confirmed severe pre-capillary hypertension via right-heart catheterization (RAP 9 PAP 95/35/55  PAWP 10 Thermodilution CO/CI  3.31/1.66  Chong 6.44/3.23  PVR thermodilution 14 FREDERICK). She does not have LV dysfunction and V/Q scan was negative.  She was started on sildenafil 20 mg TID and Opsumit 10 mg daily.  She was found to have sleep apnea and started on Auto PaP but she was noncompliant and the equipment was taken away. She started on Jardiance in June. \par \par 12-19-22\par Pt was non compliant for the 90 day compliance period designated by Medicare.  She will need a new attended sleep study to re-qualify for auto PAP. Order entered for a split night study in September. Pt has not gone for the study yet. \par She had a knee replacement at [facility] in October. Was in the hospital for 5 days and then in rehab for 1 month. She has lost over 20 pounds. She is feeling well. Her knee burns at night and makes it difficult for her to sleep. \par She had a cough and cold last week. Only needed an extra neb once. Otherwise, no SOB, no wheezing, no chest tightness, no dizziness, no syncope. \par \par

## 2022-12-19 NOTE — ASSESSMENT
[FreeTextEntry1] : 68 yo F with hx of asthma, HTN, HPL, DM presented to [facility] with SOB, found to have severe precapillary PAH with PASP 100mmHg on TTE and dilated RV with mildly reduced function\par \par 1. Pulmonary Hypertension - WHO Group I PAH RHC 10/7/21 with mPAP 55, PAWP 10, CO/CI 3.31/1.66, PVR 14WU. Suspect 2/2 idiopathic PAH and started on dual therapy, although sleep study demonstrated severe RG which is likely contributing (WHO Group II) but does not explain severity of disease. Rheum studies negative (ALEJANDRO, RF, Scleroderma ab, complement), US abdomen without evidence of cirrhosis or portal hypertension, V/Q negative for CTEPH, HIV negative, LHC without CAD and TTE with normal LV size/function, no valvular disease. CT chest with mosaicism, otherwise no fibrosis or significant parenchymal disease. Has history of asthma and CT suggestive of bronchiolitis or airways disease. Started on dual oral therapy, sildenafil and macitentan, and tolerating therapy well with already some improvement in exercise tolerance and significant improvement on TTE (RVSP 38 w/ normal RV size/function) 6MWT  improved by 18 meters, still decreased (110m), but she is limited by knee pain. Also started on Jardiance given hx of DM, obesity, RG, stopped HCTZ given borderline BP. Has now had weight loss of 20lbs and s/p knee replacement. Was being treated for RG and had significant improvement in PASP on TTE. However, AutoPAP was taken away due to noncompliance, will bring her in for split night study and obtain new machine. Discussed importance of treating severe sleep apnea given long term adverse cardiovascular risk associated with severe RG and in setting of known pulmonary hypertension. \par \par Plan:\par -continue furosemide 20 mg daily, Jardiance 10mg\par -instructed to call if gains 2 lbs in 24 hours or 5 pounds in 1 week\par -Repeat 6MWT in 3 months now that patient is s/p knee surgery.\par - Management of RG as below\par - severity of PAH based on RHC is severe and if persistently symptomatic would usually be considered for triple therapy. However, may be difficult to add third agent given complexity with titration and significant side effects. She has had a profound response to dual oral therapy, SGLT2 inh, and CPAP treatment. Continue to monitor for now\par - Trend NTproBNP, check CBC and CMP \par \par \par 2. Asthma- Long history of asthma, CT with evidence of mosaicism suggestive of air trapping vs. constrictive bronchiolitis. \par \par Plan\par -continue with home inhalers for now, Advair and prn albuterol --  increased advair to higher dose last visit given frequent use of albuterol (morning and evening daily), now significantly improved and rarely needing rescue inhaler\par \par 3. Obstructive Sleep Apnea\par Pt had an attended sleep study that showed.: AHI AASM 42.6   AHI CMS 35.2.  Mean SpO2 93% with minimum SpO2 77%. SpO2 <= 88% for 9.2 minutes  APRIL 20.8\par \par Plan:\par -Per group home she was placed on APAP every night but does not like the mask on her face so pulls it off before she falls asleep so compliance has been 23%. Pt thinks she can be more compliant if she has the nasal pillows instead of full face mask. Equipment taken away, did not go for repeat split night study given recent surgery for TKR. Now agreeable to repeat sleep study and obtain new equopment\par \par \par 4. Nocturnal cough - on PPI and Flonase, CT chest with areas of esophageal dilation, high suspicion for GERD. Continue with PPI and elevation of head when sleeping.\par \par \par \par RTC 3 months + 6MWT

## 2022-12-20 ENCOUNTER — NON-APPOINTMENT (OUTPATIENT)
Age: 67
End: 2022-12-20

## 2022-12-20 LAB
ANION GAP SERPL CALC-SCNC: 13 MMOL/L
BUN SERPL-MCNC: 16 MG/DL
CALCIUM SERPL-MCNC: 10.8 MG/DL
CHLORIDE SERPL-SCNC: 105 MMOL/L
CO2 SERPL-SCNC: 23 MMOL/L
CREAT SERPL-MCNC: 0.87 MG/DL
EGFR: 72 ML/MIN/1.73M2
GLUCOSE SERPL-MCNC: 90 MG/DL
NT-PROBNP SERPL-MCNC: 327 PG/ML
POTASSIUM SERPL-SCNC: 4.6 MMOL/L
SODIUM SERPL-SCNC: 141 MMOL/L

## 2023-01-12 ENCOUNTER — APPOINTMENT (OUTPATIENT)
Dept: SLEEP CENTER | Facility: HOSPITAL | Age: 68
End: 2023-01-12

## 2023-03-07 ENCOUNTER — APPOINTMENT (OUTPATIENT)
Dept: PULMONOLOGY | Facility: CLINIC | Age: 68
End: 2023-03-07
Payer: MEDICARE

## 2023-03-07 PROCEDURE — 94618 PULMONARY STRESS TESTING: CPT

## 2023-03-17 PROBLEM — J45.909 ASTHMA: Status: ACTIVE | Noted: 2021-10-25

## 2023-03-22 ENCOUNTER — APPOINTMENT (OUTPATIENT)
Dept: PULMONOLOGY | Facility: CLINIC | Age: 68
End: 2023-03-22
Payer: MEDICARE

## 2023-03-22 VITALS
TEMPERATURE: 97.6 F | SYSTOLIC BLOOD PRESSURE: 121 MMHG | DIASTOLIC BLOOD PRESSURE: 65 MMHG | OXYGEN SATURATION: 96 % | BODY MASS INDEX: 33.8 KG/M2 | WEIGHT: 198 LBS | HEIGHT: 64 IN | HEART RATE: 84 BPM

## 2023-03-22 DIAGNOSIS — R60.0 LOCALIZED EDEMA: ICD-10-CM

## 2023-03-22 DIAGNOSIS — J45.909 UNSPECIFIED ASTHMA, UNCOMPLICATED: ICD-10-CM

## 2023-03-22 PROCEDURE — 99214 OFFICE O/P EST MOD 30 MIN: CPT

## 2023-03-22 RX ORDER — PREDNISONE 20 MG/1
20 TABLET ORAL DAILY
Qty: 10 | Refills: 0 | Status: COMPLETED | COMMUNITY
Start: 2022-03-23 | End: 2023-03-22

## 2023-03-22 RX ORDER — FUROSEMIDE 40 MG/1
40 TABLET ORAL DAILY
Qty: 7 | Refills: 0 | Status: COMPLETED | COMMUNITY
Start: 2023-03-22 | End: 2023-03-29

## 2023-03-22 RX ORDER — OXYBUTYNIN CHLORIDE 5 MG/1
5 TABLET ORAL
Refills: 0 | Status: COMPLETED | COMMUNITY
End: 2023-03-22

## 2023-03-22 RX ORDER — OXYBUTYNIN CHLORIDE 10 MG/1
10 TABLET, EXTENDED RELEASE ORAL
Refills: 0 | Status: ACTIVE | COMMUNITY

## 2023-03-22 RX ORDER — PRAVASTATIN SODIUM 10 MG/1
10 TABLET ORAL
Refills: 0 | Status: ACTIVE | COMMUNITY

## 2023-03-22 RX ORDER — ESCITALOPRAM OXALATE 10 MG/1
10 TABLET, FILM COATED ORAL
Refills: 0 | Status: ACTIVE | COMMUNITY

## 2023-03-22 RX ORDER — METFORMIN HYDROCHLORIDE 500 MG/1
500 TABLET, COATED ORAL
Refills: 0 | Status: COMPLETED | COMMUNITY
End: 2023-03-22

## 2023-03-22 RX ORDER — MELATONIN 3 MG
3 CAPSULE ORAL
Refills: 0 | Status: ACTIVE | COMMUNITY

## 2023-03-22 RX ORDER — METFORMIN HYDROCHLORIDE 850 MG/1
850 TABLET, COATED ORAL
Refills: 0 | Status: ACTIVE | COMMUNITY

## 2023-03-23 ENCOUNTER — NON-APPOINTMENT (OUTPATIENT)
Age: 68
End: 2023-03-23

## 2023-03-23 LAB
ESTIMATED AVERAGE GLUCOSE: 128 MG/DL
HBA1C MFR BLD HPLC: 6.1 %
HCT VFR BLD CALC: 38.6 %
HGB BLD-MCNC: 11.2 G/DL
MCHC RBC-ENTMCNC: 24.2 PG
MCHC RBC-ENTMCNC: 29 GM/DL
MCV RBC AUTO: 83.5 FL
NT-PROBNP SERPL-MCNC: 323 PG/ML
PLATELET # BLD AUTO: 245 K/UL
RBC # BLD: 4.62 M/UL
RBC # FLD: 18.2 %
WBC # FLD AUTO: 5.08 K/UL

## 2023-03-23 NOTE — PHYSICAL EXAM
[No Acute Distress] : no acute distress [No Deformities] : no deformities [IV] : Mallampati Class: IV [Normal Appearance] : normal appearance [No Neck Mass] : no neck mass [No JVD] : no jvd [Normal Rate/Rhythm] : normal rate/rhythm [Normal S1, S2] : normal s1, s2 [No Resp Distress] : no resp distress [No Acc Muscle Use] : no acc muscle use [Clear to Auscultation Bilaterally] : clear to auscultation bilaterally [No Abnormalities] : no abnormalities [Benign] : benign [No Clubbing] : no clubbing [No Cyanosis] : no cyanosis [Normal Color/ Pigmentation] : normal color/ pigmentation [No Focal Deficits] : no focal deficits [Oriented x3] : oriented x3 [Normal Affect] : normal affect [TextBox_105] : trace pitting pedal edema, with element of non-pitting edema more proximally on the pretibial area

## 2023-03-23 NOTE — HISTORY OF PRESENT ILLNESS
[TextBox_4] : 66 F with developmental disability, HTN, HLD, DM2, asthma, and CAD who presented 10/2021 with progressive worsening GOYAL associated with chest discomfort x1-2 weeks, found to have elevated PASP on ECHO, confirmed severe pre-capillary hypertension via right-heart catheterization (RAP 9 PAP 95/35/55  PAWP 10 Thermodilution CO/CI  3.31/1.66  Chong 6.44/3.23  PVR thermodilution 14 FREDERICK). She does not have LV dysfunction and V/Q scan was negative.  She was started on sildenafil 20 mg TID and Opsumit 10 mg daily.  She was found to have sleep apnea and started on Auto PaP but she was noncompliant and the equipment was taken away. She started on Jardiance in June. \par \par 3-22-23\par Pt was non compliant for the 90 day compliance period designated by Medicare.  She will need a new attended sleep study to re-qualify for auto PAP. Order entered for a split night study in September. Pt has not gone for the study yet. \par She had a knee replacement at [facility] in October. Was in the hospital for 5 days and then in rehab for 1 month. She has lost over 20 pounds. She is feeling well. Her knee burns at night and makes it difficult for her to sleep.  Icy Hot is not helping. \par She recovered from knee surgery. Lost 22 pounds in the last year. She likes to dance \par Had a tight chest pain earlier today while sitting down. Last 30 minutes. Radiated slightly to the shoulder. Happened twice today. Her cardiologist is Dr. Maldonado\par One of the staff (Carmen) puts the Auto PAP on her she wears it all night.  When [employer] works she does not wear the mask .c she goyal snot help patient put it on. \par No shortness of breath. Walks with a walker 5 blocks without stopping. \par Feels her asthma is acting up today. \par + LE edema R> L Wears compression stockings  the staff who prepares dinner adds salt to the food\par

## 2023-03-23 NOTE — ASSESSMENT
[FreeTextEntry1] : 66 yo F with hx of asthma, HTN, HPL, DM presented to [facility] with SOB, found to have severe precapillary PAH with PASP 100mmHg on TTE and dilated RV with mildly reduced function\par \par 1. Pulmonary Hypertension - WHO Group I PAH RHC 10/7/21 with mPAP 55, PAWP 10, CO/CI 3.31/1.66, PVR 14WU. Suspect 2/2 idiopathic PAH and started on dual therapy, although sleep study demonstrated severe RG which is likely contributing (WHO Group II) but does not explain severity of disease. Rheum studies negative (ALEJANDRO, RF, Scleroderma ab, complement), US abdomen without evidence of cirrhosis or portal hypertension, V/Q negative for CTEPH, HIV negative, LHC without CAD and TTE with normal LV size/function, no valvular disease. CT chest with mosaicism, otherwise no fibrosis or significant parenchymal disease. Has history of asthma and CT suggestive of bronchiolitis or airways disease. Started on dual oral therapy, sildenafil and macitentan, and tolerating therapy well with already some improvement in exercise tolerance and significant improvement on TTE (RVSP 38 w/ normal RV size/function) 6MWT  improved by 18 meters, still decreased (110m), but she is limited by knee pain. Also started on Jardiance given hx of DM, obesity, RG, stopped HCTZ given borderline BP. Has now had weight loss of 20lbs and s/p knee replacement. Was being treated for RG and had significant improvement in PASP on TTE. However, AutoPAP was taken away due to noncompliance, will bring her in for split night study and obtain new machine. Discussed importance of treating severe sleep apnea given long term adverse cardiovascular risk associated with severe RG and in setting of known pulmonary hypertension. Worsening LE edema since knee surgery. \par \par Plan:\par -Increase lasix to 40mg once daily x 1 week, continue w/ Jardiance 10mg\par -instructed to call if gains 2 lbs in 24 hours or 5 pounds in 1 week\par -Repeat 6MWT with significant improvement in distance (increased by 72 meters since Sept)\par - Management of RG as below\par - severity of PAH based on RHC is severe and if persistently symptomatic would usually be considered for triple therapy. However, may be difficult to add third agent given complexity with titration and significant side effects. She has had a profound response to dual oral therapy, SGLT2 inh, and CPAP treatment, last TTE w/ normal RV size/function. Continue to monitor for now\par - Repeat TTE 7/23\par \par \par 2. Asthma- Long history of asthma, CT with evidence of mosaicism suggestive of air trapping vs. constrictive bronchiolitis. No wheezing on exam today\par \par Plan\par -continue with home inhalers for now, Advair and prn albuterol --  increased advair to higher dose last visit given frequent use of albuterol (morning and evening daily), now significantly improved and rarely needing rescue inhaler. C/o mild asthma symptoms, recommend zyrtec.\par \par 3. Obstructive Sleep Apnea\par Pt had an attended sleep study that showed.: AHI AASM 42.6   AHI CMS 35.2.  Mean SpO2 93% with minimum SpO2 77%. SpO2 <= 88% for 9.2 minutes  APRIL 20.8\par \par Plan:\par -using nightly for 3 hrs, will d/w group home re: importance of compliance. \par \par \par 4. Nocturnal cough - on PPI and Flonase, CT chest with areas of esophageal dilation, high suspicion for GERD. Continue with PPI and elevation of head when sleeping.\par \par \par \par RTC 3 months w/ TTE

## 2023-03-23 NOTE — REVIEW OF SYSTEMS
[Recent Wt Gain (___ Lbs)] : ~T recent [unfilled] lb weight gain [Cough] : cough [SOB on Exertion] : sob on exertion [Chest Discomfort] : chest discomfort [Edema] : edema [Negative] : Endocrine [Palpitations] : no palpitations

## 2023-03-23 NOTE — PROCEDURE
[FreeTextEntry1] : 3/7/23 6MWT\par 182 meters\par Resting SpO2 97% on room air\par End Test SpO2 92% on room air\par Magdiel 0\par Reduced walk distance, but significantly improved from prior (110). Magdiel 0 suggesting decreased effort. Prior 6MWT Magdiel 6\par *****\par 12/19/22\par NT pro \par *****\par 9/21/22 6MWT\par 110 meters\par Resting SpO2 94% on room air\par with 3 min 15 sec reamining SpO2 90%\par Pt ended test due to fatigue\par End test SpO2 90% on room air\par *****\par 8/2/22 APAP Compliance Report\par 6/22 days (27.3%)\par Avg usage 1 hour 13 minutes\par 0 days with usage > 4 hours\par Mean pressure 12 mmHg\par *****\par 7/14/22 ECHO\par 1. Normal left ventricular size and systolic function.\par  2. Normal right ventricular size and systolic function.\par  3. No significant valvular disease.\par  4. Pulmonary hypertension present, pulmonary artery systolic pressure is 38 mmHg.\par  5. No pericardial effusion.\par  6. Compared to the previous TTE performed on 2/23/2022, right ventricle appears normal in size and function.\par *****\par 6/15/22\par Hbg 10.2\par NT pro \par LFTs WNL\par Potassium 3.4\par *****\par 3/23/22 PFT\par FVC 1.62 (67)\par FEV1 1.39 (73)\par FEV1/FVC 86\par FEF 25-75% 1.84 (104)\par \par Pt had difficulty following PFT instructions\par Unable to obtain DLCO or N2 washout\par *****\par 3/23/22 6MWT\par 92 meters\par Resting Magdiel SOB 2 Magdiel WOB 0\par Resting SpO2 93% on room air HR 88  128/97\par At 3 min 30 sec pt asked to sit and rest c/o dizziness\par After 1 minute rest pt declined to resume 6MWT\par End test SpO2 89% on room air  /72\par Magdiel SOB end of test 6, sever Magdiel WOB 10 very, very severe\par *****\par 3/21/22 COMPLIANCE DATA\par unavailable\par *****\par 2/23/22 ECHO\par  1. Normal left ventricular size and systolic function.\par  2. Dilated right ventricular size.\par  3. Borderline normal right ventricular systolic function.\par  4. Normal atria.\par  5. Aortic sclerosis without significant stenosis.\par  6. No other significant valvular disease.\par  7. Pulmonary hypertension present, pulmonary artery systolic pressure is \par 44 mmHg.\par  8. No pericardial effusion.\par  9. Compared to the previous TTE performed on 10/6/2021, pulmonary artery \par systolic pressures have decreased.\par *****\par 2/14/22  LABS (post Lasix)\par Pro \par Creatinine 0.9\par *****\par 2/2/22\par Pro \par LFTs WNL\par *****\par 11/3/21 ATTENDED SLEEP STUDY\par AHI AASM 42.6   AHI CMS 35.2\par Mean SpO2 93% with minimum SpO2 77%. SpO2 <= 88% for 9.2 minutes  APRIL 20.8\par *****\par 10/26/21 LABS\par Pro BNP  925\par *****\par \par 10/8/21 CT CHEST\par Bilateral air trapping. Considerations include constrictive bronchiolitis and HP.\par *****\par 10/8/21 V/Q scan\par There is no evidence of a segmental or subsegmental perfusion defect to suggest pulmonary embolism. Ventilation imaging is suboptimal.\par Impression: No evidence of pulmonary embolism.\par *****\par 10/7/21 LHC & RHC\par Non-obstructive CAD, normal LV function\par RAP 9\par PAP 95/35 (55)\par PAWP 10\par Thermodilution CO/CI 3.31/1.66  Scar (assumed VO@ 296.87) 6.44/3.23\par PVR (thermodilution) 14 FREDERICK\par \par Wilver\par PAP 82/35 (47)\par PAWP 10\par Thermodilution CO/CI 3.45/1.72  Scar 5.27/2.65\par PVR (thermodilution) 10.7 FREDERICK\par \par Consistent with severe precapillary pulmonary arterial hypertension and low CO/CI. Of note, significant discordance between Scar and thermodilution CO/CI> Assumed SCAR VO2 is 262.87 which is likely overestimated and falsely increasing CO/CI. In absence of severe TR or L -> shunt would favor thermodilution. \par *****\par 10/6/21 ECHOCARDIOGRAM\par  1. Normal left ventricular size and systolic function.\par  2. Dilated right ventricular size.\par  3. Mildly reduced right ventricular systolic function.\par  4. Dilated right atrium.\par  5. Mild tricuspid regurgitation.\par  6. No other significant valvular disease.\par  7. Pulmonary hypertension present, pulmonary artery systolic pressure is 101 mmHg.\par  8. No pericardial effusion.\par  9. No prior echo is available for comparison.\par TAPSE 20  RV S’ 8\par

## 2023-03-24 LAB — EOSINOPHIL # BLD MANUAL: 70 /UL

## 2023-09-06 ENCOUNTER — NON-APPOINTMENT (OUTPATIENT)
Age: 68
End: 2023-09-06

## 2023-09-28 ENCOUNTER — RX RENEWAL (OUTPATIENT)
Age: 68
End: 2023-09-28

## 2023-10-19 ENCOUNTER — OUTPATIENT (OUTPATIENT)
Dept: OUTPATIENT SERVICES | Facility: HOSPITAL | Age: 68
LOS: 1 days | End: 2023-10-19
Payer: MEDICARE

## 2023-10-19 DIAGNOSIS — I27.20 PULMONARY HYPERTENSION, UNSPECIFIED: ICD-10-CM

## 2023-10-19 PROCEDURE — 76376 3D RENDER W/INTRP POSTPROCES: CPT | Mod: 26

## 2023-10-19 PROCEDURE — 93306 TTE W/DOPPLER COMPLETE: CPT | Mod: 26

## 2023-10-19 PROCEDURE — 93306 TTE W/DOPPLER COMPLETE: CPT

## 2023-11-01 ENCOUNTER — APPOINTMENT (OUTPATIENT)
Dept: PULMONOLOGY | Facility: CLINIC | Age: 68
End: 2023-11-01
Payer: MEDICARE

## 2023-11-01 VITALS
WEIGHT: 195 LBS | SYSTOLIC BLOOD PRESSURE: 133 MMHG | OXYGEN SATURATION: 94 % | HEIGHT: 64 IN | DIASTOLIC BLOOD PRESSURE: 81 MMHG | HEART RATE: 66 BPM | TEMPERATURE: 98.1 F | BODY MASS INDEX: 33.29 KG/M2

## 2023-11-01 DIAGNOSIS — M25.569 PAIN IN UNSPECIFIED KNEE: ICD-10-CM

## 2023-11-01 PROCEDURE — 99214 OFFICE O/P EST MOD 30 MIN: CPT

## 2023-11-15 ENCOUNTER — RX RENEWAL (OUTPATIENT)
Age: 68
End: 2023-11-15

## 2023-11-24 RX ORDER — LIDOCAINE 40 MG/G
4 PATCH TOPICAL
Qty: 1 | Refills: 2 | Status: ACTIVE | COMMUNITY
Start: 2023-11-24 | End: 1900-01-01

## 2023-11-24 RX ORDER — LIDOCAINE 5% 700 MG/1
5 PATCH TOPICAL
Qty: 1 | Refills: 5 | Status: DISCONTINUED | COMMUNITY
Start: 2023-11-01 | End: 2023-11-24

## 2023-12-01 ENCOUNTER — RX RENEWAL (OUTPATIENT)
Age: 68
End: 2023-12-01

## 2023-12-13 ENCOUNTER — NON-APPOINTMENT (OUTPATIENT)
Age: 68
End: 2023-12-13

## 2023-12-14 RX ORDER — FUROSEMIDE 20 MG/1
20 TABLET ORAL
Qty: 30 | Refills: 5 | Status: ACTIVE | COMMUNITY
Start: 2022-02-02 | End: 1900-01-01

## 2024-02-04 NOTE — REASON FOR VISIT
[Initial Evaluation] : an initial evaluation [Pulmonary Hypertension] : pulmonary hypertension [Sleep Apnea] : sleep apnea

## 2024-02-05 ENCOUNTER — APPOINTMENT (OUTPATIENT)
Dept: PULMONOLOGY | Facility: CLINIC | Age: 69
End: 2024-02-05
Payer: MEDICARE

## 2024-02-05 VITALS
WEIGHT: 189 LBS | BODY MASS INDEX: 32.27 KG/M2 | HEIGHT: 64 IN | HEART RATE: 72 BPM | OXYGEN SATURATION: 98 % | SYSTOLIC BLOOD PRESSURE: 113 MMHG | TEMPERATURE: 97.6 F | DIASTOLIC BLOOD PRESSURE: 68 MMHG

## 2024-02-05 PROCEDURE — 99213 OFFICE O/P EST LOW 20 MIN: CPT

## 2024-02-05 NOTE — HISTORY OF PRESENT ILLNESS
[FreeTextEntry1] : The patient is a 68 year old F with developmental disability and PMHx of HTN, HLD, DM2, asthma, CAD, pulmonary HTN, severe RG referred by Dr. Shaw to discuss inspire implant. Pulmonary HTN suspected to be idiopathic PAH however severe RG likely contributing. Was started on APAP but machine was taken away due to noncompliance. Was not able to find machine comfortable.  [ESS] : 3

## 2024-02-05 NOTE — REVIEW OF SYSTEMS
[Fatigue] : fatigue [Snoring] : snoring [Negative] : Genitourinary [Difficulty Initiating Sleep] : no difficulty falling asleep [Difficulty Maintaining Sleep] : no difficulty maintaining sleep [Lower Extremity Discomfort] : no lower extremity discomfort [Late day/ Evening symptoms] : no late day/evening symptoms [Unusual Sleep Behavior] : no unusual sleep behavior [Hypersomnolence] : not sleeping much more than usual [Cataplexy] :  no cataplexy

## 2024-02-05 NOTE — END OF VISIT
[FreeTextEntry3] :   I, Ivanna Yung MD, personally performed the evaluation and management (E/M) services for this patient. That E/M includes conducting the clinically appropriate initial history &/or exam, assessing all conditions, and establishing the plan of care. I have also independently reviewed the medical records and imaging at the time of this office visit, and discussed the above mentioned images with interpretations with the patient. Today, RAMÍREZ Wahl was here to participate in the visit & follow plan of care established by me.

## 2024-02-05 NOTE — PHYSICAL EXAM
[General Appearance - Well Developed] : well developed [General Appearance - Well Nourished] : well nourished [Neck Appearance] : the appearance of the neck was normal [] : no respiratory distress [Exaggerated Use Of Accessory Muscles For Inspiration] : no accessory muscle use [Oriented To Time, Place, And Person] : oriented to person, place, and time [Mood] : the mood was normal [Abnormal Walk] : normal gait

## 2024-02-05 NOTE — ASSESSMENT
[FreeTextEntry1] : REVIEWED PSG 11/2021: AHI 42.6 (AASM); AHI 35.2 (CMS); t88 9.2 minutes   The patient is a 68 year old F with developmental disability and PMHx of HTN, HLD, DM2, asthma, CAD, pulmonary HTN, severe RG referred by Dr. Shaw to discuss inspire implant. Pulmonary HTN suspected to be idiopathic PAH however severe RG likely contributing. Was not able to tolerate CPAP. Lives in a group home and has help from aids. Will need HST to determine current RG severity and eligibility for inspire as she states has lost 10-20 pounds since initial sleep study.   Follow-up after HST.

## 2024-02-05 NOTE — CONSULT LETTER
[Dear  ___] : Dear  [unfilled], [Consult Letter:] : I had the pleasure of evaluating your patient, [unfilled]. [Please see my note below.] : Please see my note below. [Consult Closing:] : Thank you very much for allowing me to participate in the care of this patient.  If you have any questions, please do not hesitate to contact me. [Sincerely,] : Sincerely, [FreeTextEntry3] : Ivanna Yung MD  [employer] at [facility] Pulmonary, Critical Care, and Sleep Medicine

## 2024-02-21 ENCOUNTER — OUTPATIENT (OUTPATIENT)
Dept: OUTPATIENT SERVICES | Facility: HOSPITAL | Age: 69
LOS: 1 days | End: 2024-02-21
Payer: MEDICARE

## 2024-02-21 ENCOUNTER — APPOINTMENT (OUTPATIENT)
Dept: SLEEP CENTER | Facility: HOME HEALTH | Age: 69
End: 2024-02-21
Payer: MEDICARE

## 2024-02-21 PROCEDURE — G0400: CPT | Mod: 26

## 2024-02-21 PROCEDURE — 95800 SLP STDY UNATTENDED: CPT

## 2024-02-27 DIAGNOSIS — G47.33 OBSTRUCTIVE SLEEP APNEA (ADULT) (PEDIATRIC): ICD-10-CM

## 2024-03-20 RX ORDER — POTASSIUM CHLORIDE 1500 MG/1
20 TABLET, FILM COATED, EXTENDED RELEASE ORAL
Qty: 60 | Refills: 2 | Status: ACTIVE | COMMUNITY
Start: 2022-06-16 | End: 1900-01-01

## 2024-03-27 ENCOUNTER — RX RENEWAL (OUTPATIENT)
Age: 69
End: 2024-03-27

## 2024-03-27 RX ORDER — EMPAGLIFLOZIN 10 MG/1
10 TABLET, FILM COATED ORAL DAILY
Qty: 30 | Refills: 5 | Status: ACTIVE | COMMUNITY
Start: 2022-06-15 | End: 1900-01-01

## 2024-04-25 RX ORDER — SILDENAFIL 20 MG/1
20 TABLET ORAL 3 TIMES DAILY
Qty: 90 | Refills: 5 | Status: ACTIVE | COMMUNITY
Start: 2022-04-05 | End: 1900-01-01

## 2024-05-14 ENCOUNTER — RX RENEWAL (OUTPATIENT)
Age: 69
End: 2024-05-14

## 2024-05-14 RX ORDER — MACITENTAN 10 MG/1
10 TABLET, FILM COATED ORAL DAILY
Qty: 30 | Refills: 5 | Status: ACTIVE | COMMUNITY
Start: 2021-10-12 | End: 1900-01-01

## 2024-05-15 ENCOUNTER — APPOINTMENT (OUTPATIENT)
Dept: PULMONOLOGY | Facility: CLINIC | Age: 69
End: 2024-05-15

## 2024-06-27 PROBLEM — I27.20 PULMONARY HYPERTENSION: Status: ACTIVE | Noted: 2021-10-12

## 2024-06-27 PROBLEM — I50.30 (HFPEF) HEART FAILURE WITH PRESERVED EJECTION FRACTION: Status: ACTIVE | Noted: 2022-06-15

## 2024-06-27 PROBLEM — R06.09 DYSPNEA ON EXERTION: Status: ACTIVE | Noted: 2021-10-25

## 2024-06-27 PROBLEM — G47.33 OBSTRUCTIVE SLEEP APNEA: Status: ACTIVE | Noted: 2022-01-31

## 2024-07-03 ENCOUNTER — APPOINTMENT (OUTPATIENT)
Dept: PULMONOLOGY | Facility: CLINIC | Age: 69
End: 2024-07-03
Payer: MEDICARE

## 2024-07-03 ENCOUNTER — NON-APPOINTMENT (OUTPATIENT)
Age: 69
End: 2024-07-03

## 2024-07-03 VITALS
SYSTOLIC BLOOD PRESSURE: 110 MMHG | TEMPERATURE: 97.7 F | HEIGHT: 64 IN | HEART RATE: 82 BPM | OXYGEN SATURATION: 98 % | WEIGHT: 182 LBS | RESPIRATION RATE: 12 BRPM | BODY MASS INDEX: 31.07 KG/M2 | DIASTOLIC BLOOD PRESSURE: 71 MMHG

## 2024-07-03 DIAGNOSIS — R06.09 OTHER FORMS OF DYSPNEA: ICD-10-CM

## 2024-07-03 DIAGNOSIS — I27.20 PULMONARY HYPERTENSION, UNSPECIFIED: ICD-10-CM

## 2024-07-03 DIAGNOSIS — G47.33 OBSTRUCTIVE SLEEP APNEA (ADULT) (PEDIATRIC): ICD-10-CM

## 2024-07-03 DIAGNOSIS — I50.30 UNSPECIFIED DIASTOLIC (CONGESTIVE) HEART FAILURE: ICD-10-CM

## 2024-07-03 PROCEDURE — 99214 OFFICE O/P EST MOD 30 MIN: CPT | Mod: 25

## 2024-07-03 PROCEDURE — 94618 PULMONARY STRESS TESTING: CPT | Mod: 26

## 2024-07-03 PROCEDURE — ZZZZZ: CPT

## 2024-07-03 PROCEDURE — 94618 PULMONARY STRESS TESTING: CPT

## 2024-07-03 RX ORDER — CLONIDINE HYDROCHLORIDE 0.1 MG/1
0.1 TABLET ORAL TWICE DAILY
Refills: 0 | Status: ACTIVE | COMMUNITY

## 2024-07-03 RX ORDER — METFORMIN HYDROCHLORIDE 850 MG/1
850 TABLET, COATED ORAL
Refills: 0 | Status: ACTIVE | COMMUNITY

## 2024-07-03 RX ORDER — ESCITALOPRAM OXALATE 10 MG/1
10 TABLET, FILM COATED ORAL
Refills: 0 | Status: ACTIVE | COMMUNITY

## 2024-07-03 RX ORDER — MONTELUKAST 10 MG/1
10 TABLET, FILM COATED ORAL
Refills: 0 | Status: ACTIVE | COMMUNITY

## 2024-08-20 NOTE — PATIENT PROFILE ADULT - FOOD INSECURITY
Please share these instructions with your physicians if you are seen by a physician between treatment room visits.
no

## 2024-09-04 ENCOUNTER — NON-APPOINTMENT (OUTPATIENT)
Age: 69
End: 2024-09-04

## 2024-09-05 ENCOUNTER — APPOINTMENT (OUTPATIENT)
Dept: PULMONOLOGY | Facility: CLINIC | Age: 69
End: 2024-09-05
Payer: MEDICARE

## 2024-09-05 VITALS
HEIGHT: 64 IN | BODY MASS INDEX: 31.41 KG/M2 | TEMPERATURE: 97.7 F | DIASTOLIC BLOOD PRESSURE: 81 MMHG | RESPIRATION RATE: 12 BRPM | HEART RATE: 80 BPM | WEIGHT: 184 LBS | OXYGEN SATURATION: 96 % | SYSTOLIC BLOOD PRESSURE: 146 MMHG

## 2024-09-05 DIAGNOSIS — I27.20 PULMONARY HYPERTENSION, UNSPECIFIED: ICD-10-CM

## 2024-09-05 DIAGNOSIS — G47.33 OBSTRUCTIVE SLEEP APNEA (ADULT) (PEDIATRIC): ICD-10-CM

## 2024-09-05 PROCEDURE — G2211 COMPLEX E/M VISIT ADD ON: CPT

## 2024-09-05 PROCEDURE — 99214 OFFICE O/P EST MOD 30 MIN: CPT

## 2024-09-05 NOTE — PHYSICAL EXAM
[General Appearance - Well Developed] : well developed [General Appearance - Well Nourished] : well nourished [Neck Appearance] : the appearance of the neck was normal [] : no respiratory distress [Exaggerated Use Of Accessory Muscles For Inspiration] : no accessory muscle use [Abnormal Walk] : normal gait [Skin Color & Pigmentation] : normal skin color and pigmentation [Oriented To Time, Place, And Person] : oriented to person, place, and time [Affect] : the affect was normal

## 2024-09-07 NOTE — CONSULT LETTER
[Dear  ___] : Dear  [unfilled], [Courtesy Letter:] : I had the pleasure of seeing your patient, [unfilled], in my office today. [Please see my note below.] : Please see my note below. [Consult Closing:] : Thank you very much for allowing me to participate in the care of this patient.  If you have any questions, please do not hesitate to contact me. [Sincerely,] : Sincerely, [FreeTextEntry3] : Ivanna Yung MD  [employer] at [facility] Pulmonary, Critical Care, and Sleep Medicine

## 2024-09-07 NOTE — ASSESSMENT
[FreeTextEntry1] : REVIEWED PSG 11/2021: AHI 42.6 (AASM); AHI 35.2 (CMS); t88 9.2 minutes HST 2/21/2024: AHI 13 (4%); AHI 34 (3%); t88 4.5 minutes   The patient is a 69 year old F with developmental disability and PMHx of HTN, HLD, DM2, asthma, CAD, pulmonary HTN, severe RG referred by Dr. Shaw to discuss inspire implant. Pulmonary HTN suspected to be idiopathic PAH however severe RG likely contributing. Was not able to tolerate CPAP initially.  Lives in a group home and has help from aids. Repeat HST after weight loss shows mild to severe RG. Not a candidate for inspire is CMS AHI <15. [facility] resume P[facility]  Adherence goal is at least 4 hours of use per night on 70% of nights with an AHI of less than 10 events per hour. The ramifications of RG and its treatment were discussed in detail. Follow up within 12 weeks of use. Advised to reach out once she has machine and we will set-up pap nap/mask fitting.

## 2024-09-07 NOTE — ASSESSMENT
[FreeTextEntry1] : REVIEWED PSG 11/2021: AHI 42.6 (AASM); AHI 35.2 (CMS); t88 9.2 minutes HST 2/21/2024: AHI 13 (4%); AHI 34 (3%); t88 4.5 minutes   The patient is a 69 year old F with developmental disability and PMHx of HTN, HLD, DM2, asthma, CAD, pulmonary HTN, severe RG referred by Dr. Shaw to discuss inspire implant. Pulmonary HTN suspected to be idiopathic PAH however severe RG likely contributing. Was not able to tolerate CPAP initially.  Lives in a group home and has help from aids. Repeat HST after weight loss shows mild to severe RG. Not a candidate for inspire is CMS AHI <15. [facility]  Adherence goal is at least 4 hours of use per night on 70% of nights with an AHI of less than 10 events per hour. The ramifications of RG and its treatment were discussed in detail. Follow up within 12 weeks of use. Advised to reach out once she has machine and we will set-up pap nap/mask fitting.

## 2024-09-07 NOTE — HISTORY OF PRESENT ILLNESS
[FreeTextEntry1] : The patient is a 68 year old F with developmental disability and PMHx of HTN, HLD, DM2, asthma, CAD, pulmonary HTN, severe RG referred by Dr. Shaw to discuss inspire implant. Pulmonary HTN suspected to be idiopathic PAH however severe RG likely contributing. Was started on APAP but machine was taken away due to noncompliance. Was not able to find machine comfortable.  9/5/2024 Had HST, here to discuss results.  [ESS] : 3

## 2024-09-07 NOTE — CONSULT LETTER
[Dear  ___] : Dear  [unfilled], [Courtesy Letter:] : I had the pleasure of seeing your patient, [unfilled], in my office today. [Please see my note below.] : Please see my note below. [Consult Closing:] : Thank you very much for allowing me to participate in the care of this patient.  If you have any questions, please do not hesitate to contact me. [Sincerely,] : Sincerely, [FreeTextEntry3] : Ivanna Yung MD  [employer] at [facility] Pulmonary, Critical Care, and Sleep Medicine

## 2024-09-07 NOTE — ASSESSMENT
[FreeTextEntry1] : REVIEWED PSG 11/2021: AHI 42.6 (AASM); AHI 35.2 (CMS); t88 9.2 minutes HST 2/21/2024: AHI 13 (4%); AHI 34 (3%); t88 4.5 minutes   The patient is a 69 year old F with developmental disability and PMHx of HTN, HLD, DM2, asthma, CAD, pulmonary HTN, severe RG referred by Dr. Shaw to discuss inspire implant. Pulmonary HTN suspected to be idiopathic PAH however severe GR likely contributing. Was not able to tolerate CPAP initially.  Lives in a group home and has help from aids. Repeat HST after weight loss shows mild to severe RG. Not a candidate for inspire is CMS AHI <15. [facility]  Adherence goal is at least 4 hours of use per night on 70% of nights with an AHI of less than 10 events per hour. The ramifications of RG and its treatment were discussed in detail. Follow up within 12 weeks of use. Advised to reach out once she has machine and we will set-up pap nap/mask fitting.

## 2024-09-07 NOTE — REVIEW OF SYSTEMS
[Fatigue] : fatigue [Snoring] : snoring [Negative] : Psychiatric [Difficulty Initiating Sleep] : no difficulty falling asleep [Difficulty Maintaining Sleep] : no difficulty maintaining sleep [Lower Extremity Discomfort] : no lower extremity discomfort [Late day/ Evening symptoms] : no late day/evening symptoms [Unusual Sleep Behavior] : no unusual sleep behavior [Hypersomnolence] : not sleeping much more than usual [Cataplexy] :  no cataplexy

## 2024-09-07 NOTE — CONSULT LETTER
[Dear  ___] : Dear  [unfilled], [Courtesy Letter:] : I had the pleasure of seeing your patient, [unfilled], in my office today. [Please see my note below.] : Please see my note below. [Consult Closing:] : Thank you very much for allowing me to participate in the care of this patient.  If you have any questions, please do not hesitate to contact me. [Sincerely,] : Sincerely, [FreeTextEntry3] : Ivanna Yung MD  [employer] at [facility] Pulmonary, Critical Care, and Sleep Medicine

## 2024-09-07 NOTE — HISTORY OF PRESENT ILLNESS
[FreeTextEntry1] : The patient is a 68 year old F with developmental disability and PMHx of HTN, HLD, DM2, asthma, CAD, pulmonary HTN, severe RG referred by Dr. Shaw to discuss inspire implant. Pulmonary HTN suspected to be idiopathic PAH however severe RG likely contributing. Was started on APAP but machine was taken away due to noncompliance. Was not able to find machine comfortable.  9/5/2024 Had HST, here to discuss results.  [ESS] : 3 Yes

## 2024-09-12 ENCOUNTER — NON-APPOINTMENT (OUTPATIENT)
Age: 69
End: 2024-09-12

## 2024-10-03 ENCOUNTER — RESULT REVIEW (OUTPATIENT)
Age: 69
End: 2024-10-03

## 2024-10-23 ENCOUNTER — NON-APPOINTMENT (OUTPATIENT)
Age: 69
End: 2024-10-23

## 2024-10-23 ENCOUNTER — APPOINTMENT (OUTPATIENT)
Dept: PULMONOLOGY | Facility: CLINIC | Age: 69
End: 2024-10-23
Payer: MEDICARE

## 2024-10-23 VITALS
HEIGHT: 64 IN | OXYGEN SATURATION: 96 % | TEMPERATURE: 97.4 F | SYSTOLIC BLOOD PRESSURE: 124 MMHG | HEART RATE: 77 BPM | DIASTOLIC BLOOD PRESSURE: 75 MMHG | WEIGHT: 180 LBS | BODY MASS INDEX: 30.73 KG/M2

## 2024-10-23 DIAGNOSIS — G47.33 OBSTRUCTIVE SLEEP APNEA (ADULT) (PEDIATRIC): ICD-10-CM

## 2024-10-23 DIAGNOSIS — I27.20 PULMONARY HYPERTENSION, UNSPECIFIED: ICD-10-CM

## 2024-10-23 DIAGNOSIS — I50.30 UNSPECIFIED DIASTOLIC (CONGESTIVE) HEART FAILURE: ICD-10-CM

## 2024-10-23 PROCEDURE — 99214 OFFICE O/P EST MOD 30 MIN: CPT

## 2024-10-23 PROCEDURE — G2211 COMPLEX E/M VISIT ADD ON: CPT

## 2024-10-23 RX ORDER — FLUTICASONE PROPIONATE AND SALMETEROL XINAFOATE 115; 21 UG/1; UG/1
115-21 AEROSOL, METERED RESPIRATORY (INHALATION) TWICE DAILY
Qty: 15 | Refills: 3 | Status: ACTIVE | COMMUNITY
Start: 2024-10-23 | End: 1900-01-01

## 2024-10-24 LAB
ALBUMIN SERPL ELPH-MCNC: 4.3 G/DL
ALP BLD-CCNC: 65 U/L
ALT SERPL-CCNC: 12 U/L
ANION GAP SERPL CALC-SCNC: 16 MMOL/L
AST SERPL-CCNC: 15 U/L
BASOPHILS # BLD AUTO: 0.02 K/UL
BASOPHILS NFR BLD AUTO: 0.6 %
BILIRUB SERPL-MCNC: 0.5 MG/DL
BUN SERPL-MCNC: 18 MG/DL
CALCIUM SERPL-MCNC: 11.1 MG/DL
CHLORIDE SERPL-SCNC: 108 MMOL/L
CO2 SERPL-SCNC: 21 MMOL/L
CREAT SERPL-MCNC: 0.85 MG/DL
EGFR: 73 ML/MIN/1.73M2
EOSINOPHIL # BLD AUTO: 0.05 K/UL
EOSINOPHIL NFR BLD AUTO: 1.4 %
GLUCOSE SERPL-MCNC: 89 MG/DL
HCT VFR BLD CALC: 37 %
HGB BLD-MCNC: 11.3 G/DL
IMM GRANULOCYTES NFR BLD AUTO: 0 %
LYMPHOCYTES # BLD AUTO: 0.78 K/UL
LYMPHOCYTES NFR BLD AUTO: 21.7 %
MAN DIFF?: NORMAL
MCHC RBC-ENTMCNC: 25.5 PG
MCHC RBC-ENTMCNC: 30.5 GM/DL
MCV RBC AUTO: 83.5 FL
MONOCYTES # BLD AUTO: 0.28 K/UL
MONOCYTES NFR BLD AUTO: 7.8 %
NEUTROPHILS # BLD AUTO: 2.47 K/UL
NEUTROPHILS NFR BLD AUTO: 68.5 %
NT-PROBNP SERPL-MCNC: 181 PG/ML
PLATELET # BLD AUTO: 250 K/UL
POTASSIUM SERPL-SCNC: 4.2 MMOL/L
PROT SERPL-MCNC: 6.7 G/DL
RBC # BLD: 4.43 M/UL
RBC # FLD: 18.6 %
SODIUM SERPL-SCNC: 145 MMOL/L
WBC # FLD AUTO: 3.6 K/UL

## 2024-10-28 LAB — IGE SER-MCNC: 37 KU/L

## 2024-11-21 NOTE — ED ADULT NURSE NOTE - ADDITIONAL COMPLAINTS
"[facility] Progress Note    Date of Admission: 2024      Antibiotics: ceft and doxy      CC:   Chief Complaint   Patient presents with    Leg Swelling       S: Pt with no fevers and decreased left ankle pain wbc and crp down and improved. s/p  I and D yesterday.    O:  /65 (BP Location: Right arm, Patient Position: Lying)   Pulse 64   Temp 97.5 °F (36.4 °C) (Oral)   Resp 16   Ht 162.6 cm (64.02\")   Wt 77.4 kg (170 lb 10.2 oz)   SpO2 96%   BMI 29.28 kg/m²   Temp (24hrs), Av.5 °F (36.4 °C), Min:97 °F (36.1 °C), Max:98.7 °F (37.1 °C)      PE:     GENERAL: Awake and alert, in no acute distress.   HEENT: Normocephalic, atraumatic.  PERRL. EOMI. No conjunctival injection. No icterus. Oropharynx clear without evidence of thrush or exudate. No evidence of periodontal disease.    NECK: Supple without nuchal rigidity  LYMPH: No cervical, axillary or inguinal lymphadenopathy. No neck masses  HEART: RRR; No murmur, rubs, gallops.   LUNGS: Clear to auscultation bilaterally without wheezing, rales, rhonchi. Normal respiratory effort.  ABDOMEN: Soft, nontender, nondistended. Positive bowel sounds. No rebound or guarding.   EXT:  No cyanosis, clubbing edema of left ankle  : Normal appearing genitalia without Lemus catheter.  MSK: Decreased range of motion of left ankle  SKIN: Left ankle dressed.  NEURO: Oriented to PPT. No focal deficits.   PSYCHIATRIC: Normal insight and judgement. Cooperative with PE     Laboratory Data    Results from last 7 days   Lab Units 24  0647 24  1113 24  0551 24  0603   WBC 10*3/mm3  --  7.93 9.00 8.77   HEMOGLOBIN g/dL 10.3* 11.6* 12.0* 11.2*   HEMATOCRIT % 32.4* 35.5* 37.3* 35.4*   PLATELETS 10*3/mm3  --  220 203 189     Results from last 7 days   Lab Units 24  0647   SODIUM mmol/L 136   POTASSIUM mmol/L 4.0   CHLORIDE mmol/L 104   CO2 mmol/L 22.0   BUN mg/dL 19   CREATININE mg/dL 0.90   GLUCOSE mg/dL 115*   CALCIUM mg/dL 8.5*     Results from last 7 " days   Lab Units 11/15/24  0553   ALK PHOS U/L 272*   BILIRUBIN mg/dL 0.7   ALT (SGPT) U/L 19   AST (SGOT) U/L 40           Results from last 7 days   Lab Units 11/20/24  1113   CRP mg/dL 0.99*       Estimated Creatinine Clearance: 75.9 mL/min (by C-G formula based on SCr of 0.9 mg/dL).      Microbiology:  neg    Radiology:  Imaging Results (Last 24 Hours)       ** No results found for the last 24 hours. **            PROBLEM LIST:   LLE acute post traumatic cellulitis  History of coronary disease  History of hypertension and hyperlipidemia  Fevers chills  Leukocytosis elevated inflammatory markers     ASSESSMENT:  Patient is a 64-year-old with history of trauma to the left ankle developed bruising soft tissue edema followed by increased pain swelling erythema of left ankle with acute cellulitis developing over the past few days warmth to touch erythema leukocytosis elevated inflammatory markers arthrocentesis did not reveal septic arthritis had received abx.      Improving with ceft and doxy and now with Wbc and crp down improving with abx, now reports of possible I and D of ankle with hematoma possible abscess development on MRI and for I and D yesterday     PLAN:    Continue ceftriaxone and doxy    Ceftriaxone iv as outpt through 11/27/24 then oral cefuroxime 500 mg po bid x 3 weeks afterwards    Doxy 100 mg po bid x 14 days upon d/c    S/p I and D    D/c tomorrow and will infuse now at [facility]    Sebastian Quijano MD  11/21/2024       Additional Complaints

## 2024-12-16 ENCOUNTER — NON-APPOINTMENT (OUTPATIENT)
Age: 69
End: 2024-12-16

## 2025-01-03 ENCOUNTER — NON-APPOINTMENT (OUTPATIENT)
Age: 70
End: 2025-01-03

## 2025-02-12 ENCOUNTER — APPOINTMENT (OUTPATIENT)
Dept: PULMONOLOGY | Facility: CLINIC | Age: 70
End: 2025-02-12
Payer: MEDICARE

## 2025-02-12 ENCOUNTER — NON-APPOINTMENT (OUTPATIENT)
Age: 70
End: 2025-02-12

## 2025-02-12 VITALS
RESPIRATION RATE: 12 BRPM | WEIGHT: 187 LBS | TEMPERATURE: 97.9 F | BODY MASS INDEX: 31.92 KG/M2 | HEART RATE: 75 BPM | HEIGHT: 64 IN | OXYGEN SATURATION: 99 % | SYSTOLIC BLOOD PRESSURE: 141 MMHG | DIASTOLIC BLOOD PRESSURE: 71 MMHG

## 2025-02-12 DIAGNOSIS — I50.30 UNSPECIFIED DIASTOLIC (CONGESTIVE) HEART FAILURE: ICD-10-CM

## 2025-02-12 DIAGNOSIS — I27.20 PULMONARY HYPERTENSION, UNSPECIFIED: ICD-10-CM

## 2025-02-12 DIAGNOSIS — G47.33 OBSTRUCTIVE SLEEP APNEA (ADULT) (PEDIATRIC): ICD-10-CM

## 2025-02-12 DIAGNOSIS — R06.09 OTHER FORMS OF DYSPNEA: ICD-10-CM

## 2025-02-12 PROCEDURE — 94618 PULMONARY STRESS TESTING: CPT

## 2025-02-12 PROCEDURE — 94010 BREATHING CAPACITY TEST: CPT

## 2025-02-12 PROCEDURE — 99214 OFFICE O/P EST MOD 30 MIN: CPT | Mod: 25

## 2025-02-12 PROCEDURE — ZZZZZ: CPT

## 2025-02-13 LAB — NT-PROBNP SERPL-MCNC: 137 PG/ML

## 2025-02-26 NOTE — ED ADULT NURSE NOTE - PATIENT IS UNABLE TO BE SCREENED DUE TO:
Patient scheduled his CPE and labs. Would you be able to put in orders for blood work.    Acuity of illness

## 2025-02-28 ENCOUNTER — NON-APPOINTMENT (OUTPATIENT)
Age: 70
End: 2025-02-28

## 2025-03-27 ENCOUNTER — NON-APPOINTMENT (OUTPATIENT)
Age: 70
End: 2025-03-27

## 2025-04-24 ENCOUNTER — NON-APPOINTMENT (OUTPATIENT)
Age: 70
End: 2025-04-24

## 2025-04-24 DIAGNOSIS — J45.909 UNSPECIFIED ASTHMA, UNCOMPLICATED: ICD-10-CM

## 2025-04-24 DIAGNOSIS — I27.20 PULMONARY HYPERTENSION, UNSPECIFIED: ICD-10-CM

## 2025-04-24 DIAGNOSIS — G47.33 OBSTRUCTIVE SLEEP APNEA (ADULT) (PEDIATRIC): ICD-10-CM

## 2025-04-24 DIAGNOSIS — R06.09 OTHER FORMS OF DYSPNEA: ICD-10-CM

## 2025-04-28 ENCOUNTER — RX RENEWAL (OUTPATIENT)
Age: 70
End: 2025-04-28

## 2025-05-03 ENCOUNTER — OUTPATIENT (OUTPATIENT)
Dept: OUTPATIENT SERVICES | Facility: HOSPITAL | Age: 70
LOS: 1 days | End: 2025-05-03

## 2025-05-03 ENCOUNTER — APPOINTMENT (OUTPATIENT)
Dept: SLEEP CENTER | Facility: HOSPITAL | Age: 70
End: 2025-05-03

## 2025-05-03 PROCEDURE — 95807 SLEEP STUDY ATTENDED: CPT

## 2025-05-03 PROCEDURE — 95811 POLYSOM 6/>YRS CPAP 4/> PARM: CPT | Mod: 26,52

## 2025-05-07 ENCOUNTER — APPOINTMENT (OUTPATIENT)
Dept: PULMONOLOGY | Facility: CLINIC | Age: 70
End: 2025-05-07
Payer: MEDICARE

## 2025-05-07 ENCOUNTER — NON-APPOINTMENT (OUTPATIENT)
Age: 70
End: 2025-05-07

## 2025-05-07 VITALS
WEIGHT: 196.38 LBS | TEMPERATURE: 97.6 F | DIASTOLIC BLOOD PRESSURE: 72 MMHG | SYSTOLIC BLOOD PRESSURE: 132 MMHG | OXYGEN SATURATION: 96 % | RESPIRATION RATE: 12 BRPM | BODY MASS INDEX: 33.53 KG/M2 | HEIGHT: 64 IN | HEART RATE: 77 BPM

## 2025-05-07 DIAGNOSIS — I50.30 UNSPECIFIED DIASTOLIC (CONGESTIVE) HEART FAILURE: ICD-10-CM

## 2025-05-07 DIAGNOSIS — G47.33 OBSTRUCTIVE SLEEP APNEA (ADULT) (PEDIATRIC): ICD-10-CM

## 2025-05-07 DIAGNOSIS — R06.09 OTHER FORMS OF DYSPNEA: ICD-10-CM

## 2025-05-07 DIAGNOSIS — R60.0 LOCALIZED EDEMA: ICD-10-CM

## 2025-05-07 DIAGNOSIS — I27.20 PULMONARY HYPERTENSION, UNSPECIFIED: ICD-10-CM

## 2025-05-07 DIAGNOSIS — R09.81 NASAL CONGESTION: ICD-10-CM

## 2025-05-07 PROCEDURE — 99215 OFFICE O/P EST HI 40 MIN: CPT

## 2025-05-07 PROCEDURE — G2211 COMPLEX E/M VISIT ADD ON: CPT

## 2025-05-07 RX ORDER — AZELASTINE HYDROCHLORIDE 137 UG/1
0.1 SPRAY, METERED NASAL
Qty: 1 | Refills: 5 | Status: ACTIVE | COMMUNITY
Start: 2025-05-07 | End: 1900-01-01

## 2025-05-07 RX ORDER — NIFEDIPINE 90 MG/1
90 TABLET, EXTENDED RELEASE ORAL
Refills: 0 | Status: ACTIVE | COMMUNITY

## 2025-05-08 LAB
ANION GAP SERPL CALC-SCNC: 15 MMOL/L
BUN SERPL-MCNC: 18 MG/DL
CALCIUM SERPL-MCNC: 10.4 MG/DL
CHLORIDE SERPL-SCNC: 106 MMOL/L
CO2 SERPL-SCNC: 19 MMOL/L
CREAT SERPL-MCNC: 0.94 MG/DL
DEPRECATED D DIMER PPP IA-ACNC: 234 NG/ML DDU
EGFRCR SERPLBLD CKD-EPI 2021: 64 ML/MIN/1.73M2
GLUCOSE SERPL-MCNC: 108 MG/DL
NT-PROBNP SERPL-MCNC: 208 PG/ML
POTASSIUM SERPL-SCNC: 4.9 MMOL/L
SODIUM SERPL-SCNC: 140 MMOL/L

## 2025-05-13 ENCOUNTER — OUTPATIENT (OUTPATIENT)
Dept: OUTPATIENT SERVICES | Facility: HOSPITAL | Age: 70
LOS: 1 days | End: 2025-05-13

## 2025-05-13 ENCOUNTER — APPOINTMENT (OUTPATIENT)
Dept: ULTRASOUND IMAGING | Facility: HOSPITAL | Age: 70
End: 2025-05-13
Payer: MEDICARE

## 2025-05-13 PROCEDURE — 93970 EXTREMITY STUDY: CPT

## 2025-05-13 PROCEDURE — 93970 EXTREMITY STUDY: CPT | Mod: 26

## 2025-06-09 ENCOUNTER — NON-APPOINTMENT (OUTPATIENT)
Age: 70
End: 2025-06-09

## 2025-06-12 ENCOUNTER — NON-APPOINTMENT (OUTPATIENT)
Age: 70
End: 2025-06-12

## 2025-07-29 ENCOUNTER — EMERGENCY (EMERGENCY)
Facility: HOSPITAL | Age: 70
LOS: 1 days | End: 2025-07-29
Attending: STUDENT IN AN ORGANIZED HEALTH CARE EDUCATION/TRAINING PROGRAM | Admitting: STUDENT IN AN ORGANIZED HEALTH CARE EDUCATION/TRAINING PROGRAM
Payer: MEDICARE

## 2025-07-29 ENCOUNTER — APPOINTMENT (OUTPATIENT)
Dept: PULMONOLOGY | Facility: CLINIC | Age: 70
End: 2025-07-29

## 2025-07-29 VITALS
HEART RATE: 81 BPM | DIASTOLIC BLOOD PRESSURE: 82 MMHG | RESPIRATION RATE: 18 BRPM | SYSTOLIC BLOOD PRESSURE: 132 MMHG | OXYGEN SATURATION: 97 % | TEMPERATURE: 98 F | WEIGHT: 190.04 LBS

## 2025-07-29 VITALS
TEMPERATURE: 98 F | RESPIRATION RATE: 18 BRPM | SYSTOLIC BLOOD PRESSURE: 118 MMHG | OXYGEN SATURATION: 98 % | HEART RATE: 80 BPM | DIASTOLIC BLOOD PRESSURE: 76 MMHG

## 2025-07-29 PROCEDURE — 76376 3D RENDER W/INTRP POSTPROCES: CPT

## 2025-07-29 PROCEDURE — 72125 CT NECK SPINE W/O DYE: CPT | Mod: 26

## 2025-07-29 PROCEDURE — 99284 EMERGENCY DEPT VISIT MOD MDM: CPT | Mod: 25

## 2025-07-29 PROCEDURE — 76376 3D RENDER W/INTRP POSTPROCES: CPT | Mod: 26

## 2025-07-29 PROCEDURE — 72125 CT NECK SPINE W/O DYE: CPT

## 2025-07-29 PROCEDURE — 72192 CT PELVIS W/O DYE: CPT | Mod: 26

## 2025-07-29 PROCEDURE — 72192 CT PELVIS W/O DYE: CPT

## 2025-07-29 PROCEDURE — 70450 CT HEAD/BRAIN W/O DYE: CPT | Mod: 26

## 2025-07-29 PROCEDURE — 70450 CT HEAD/BRAIN W/O DYE: CPT

## 2025-07-29 PROCEDURE — 99284 EMERGENCY DEPT VISIT MOD MDM: CPT

## 2025-07-29 NOTE — ED PROVIDER NOTE - CLINICAL SUMMARY MEDICAL DECISION MAKING FREE TEXT BOX
70F hx of developmental disability, HTN, HLD, DM2, asthma, and CAD, not on blood thinners, here today for fall while getting out of bed last night at facility. Legs got caught in sheets and fell, hit her head. No LOC.     Plan for CT imaging to eval for ICH.

## 2025-07-29 NOTE — ED ADULT TRIAGE NOTE - CHIEF COMPLAINT QUOTE
Patient to ED from facility c/o unwitnessed ground level fall while getting out of bed last night. Denies LOC, denies AC use. C/O headache. No obvious signs of injury/trauma. Ambulatory with walker, AAOX4, NAD.

## 2025-07-29 NOTE — ED PROVIDER NOTE - OBJECTIVE STATEMENT
70F hx of developmental disability, HTN, HLD, DM2, asthma, and CAD, not on blood thinners, here today for fall while getting out of bed last night at facility. Legs got caught in sheets and fell, hit her head. No LOC.

## 2025-07-29 NOTE — ED ADULT NURSE NOTE - OBJECTIVE STATEMENT
71 y/o female presents to ED post fall out of bed last night. pt reportedly caught her foot in the covers when trying to get out of bed and hit her head. denies loss of consciousness, dizziness, vision changes, numbness/tingling, use of blood thinners. c/o headache and pain to the left knee. no signs of trauma or injury.   Left hip&knee replacement in feb. Right hip&knee replacement in oct. ambulatory with walker. A&OX4

## 2025-07-29 NOTE — ED ADULT NURSE NOTE - NSFALLRISKINTERV_ED_ALL_ED

## 2025-07-30 ENCOUNTER — APPOINTMENT (OUTPATIENT)
Dept: PULMONOLOGY | Facility: CLINIC | Age: 70
End: 2025-07-30
Payer: MEDICARE

## 2025-07-30 DIAGNOSIS — G47.33 OBSTRUCTIVE SLEEP APNEA (ADULT) (PEDIATRIC): ICD-10-CM

## 2025-07-30 PROCEDURE — 99214 OFFICE O/P EST MOD 30 MIN: CPT | Mod: 93

## 2025-07-30 PROCEDURE — G2211 COMPLEX E/M VISIT ADD ON: CPT | Mod: 93
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
## 2025-07-31 DIAGNOSIS — E78.5 HYPERLIPIDEMIA, UNSPECIFIED: ICD-10-CM

## 2025-07-31 DIAGNOSIS — Z04.3 ENCOUNTER FOR EXAMINATION AND OBSERVATION FOLLOWING OTHER ACCIDENT: ICD-10-CM

## 2025-07-31 DIAGNOSIS — Z88.8 ALLERGY STATUS TO OTHER DRUGS, MEDICAMENTS AND BIOLOGICAL SUBSTANCES: ICD-10-CM

## 2025-07-31 DIAGNOSIS — J45.909 UNSPECIFIED ASTHMA, UNCOMPLICATED: ICD-10-CM

## 2025-07-31 DIAGNOSIS — E11.9 TYPE 2 DIABETES MELLITUS WITHOUT COMPLICATIONS: ICD-10-CM

## 2025-07-31 DIAGNOSIS — I10 ESSENTIAL (PRIMARY) HYPERTENSION: ICD-10-CM

## 2025-07-31 DIAGNOSIS — F89 UNSPECIFIED DISORDER OF PSYCHOLOGICAL DEVELOPMENT: ICD-10-CM

## 2025-07-31 DIAGNOSIS — Z88.6 ALLERGY STATUS TO ANALGESIC AGENT: ICD-10-CM

## 2025-07-31 DIAGNOSIS — I25.10 ATHEROSCLEROTIC HEART DISEASE OF NATIVE CORONARY ARTERY WITHOUT ANGINA PECTORIS: ICD-10-CM
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
## 2025-08-13 ENCOUNTER — NON-APPOINTMENT (OUTPATIENT)
Age: 70
End: 2025-08-13

## 2025-08-13 RX ORDER — FUROSEMIDE 20 MG/1
20 TABLET ORAL
Refills: 0 | Status: ACTIVE | COMMUNITY

## 2025-08-13 RX ORDER — NIFEDIPINE 60 MG/1
60 TABLET, EXTENDED RELEASE ORAL
Refills: 0 | Status: ACTIVE | COMMUNITY

## 2025-08-25 ENCOUNTER — APPOINTMENT (OUTPATIENT)
Dept: PULMONOLOGY | Facility: CLINIC | Age: 70
End: 2025-08-25
Payer: MEDICARE

## 2025-08-25 ENCOUNTER — APPOINTMENT (OUTPATIENT)
Dept: ULTRASOUND IMAGING | Facility: HOSPITAL | Age: 70
End: 2025-08-25
Payer: MEDICARE

## 2025-08-25 ENCOUNTER — OUTPATIENT (OUTPATIENT)
Dept: OUTPATIENT SERVICES | Facility: HOSPITAL | Age: 70
LOS: 1 days | End: 2025-08-25

## 2025-08-25 VITALS
RESPIRATION RATE: 12 BRPM | BODY MASS INDEX: 32.18 KG/M2 | SYSTOLIC BLOOD PRESSURE: 132 MMHG | HEART RATE: 66 BPM | DIASTOLIC BLOOD PRESSURE: 72 MMHG | OXYGEN SATURATION: 96 % | TEMPERATURE: 98 F | HEIGHT: 64 IN | WEIGHT: 188.5 LBS

## 2025-08-25 VITALS
BODY MASS INDEX: 32.18 KG/M2 | WEIGHT: 188.5 LBS | HEART RATE: 66 BPM | DIASTOLIC BLOOD PRESSURE: 72 MMHG | RESPIRATION RATE: 12 BRPM | SYSTOLIC BLOOD PRESSURE: 132 MMHG | HEIGHT: 64 IN | OXYGEN SATURATION: 96 % | TEMPERATURE: 98 F

## 2025-08-25 VITALS
WEIGHT: 188.8 LBS | SYSTOLIC BLOOD PRESSURE: 132 MMHG | HEART RATE: 66 BPM | BODY MASS INDEX: 32.23 KG/M2 | HEIGHT: 64 IN | TEMPERATURE: 98 F | OXYGEN SATURATION: 96 % | DIASTOLIC BLOOD PRESSURE: 72 MMHG

## 2025-08-25 DIAGNOSIS — R60.0 LOCALIZED EDEMA: ICD-10-CM

## 2025-08-25 DIAGNOSIS — I27.20 PULMONARY HYPERTENSION, UNSPECIFIED: ICD-10-CM

## 2025-08-25 DIAGNOSIS — R32 UNSPECIFIED URINARY INCONTINENCE: ICD-10-CM

## 2025-08-25 DIAGNOSIS — G47.33 OBSTRUCTIVE SLEEP APNEA (ADULT) (PEDIATRIC): ICD-10-CM

## 2025-08-25 DIAGNOSIS — I50.30 UNSPECIFIED DIASTOLIC (CONGESTIVE) HEART FAILURE: ICD-10-CM

## 2025-08-25 PROCEDURE — 76700 US EXAM ABDOM COMPLETE: CPT | Mod: 26

## 2025-08-25 PROCEDURE — 76700 US EXAM ABDOM COMPLETE: CPT

## 2025-08-25 PROCEDURE — 99215 OFFICE O/P EST HI 40 MIN: CPT

## 2025-08-25 RX ORDER — SODIUM CHLORIDE 0.65 %
0.65 AEROSOL, SPRAY (ML) NASAL TWICE DAILY
Qty: 1 | Refills: 5 | Status: ACTIVE | COMMUNITY
Start: 2025-08-25 | End: 1900-01-01

## 2025-08-26 LAB
ALBUMIN SERPL ELPH-MCNC: 4.4 G/DL
ALP BLD-CCNC: 76 U/L
ALT SERPL-CCNC: 15 U/L
ANION GAP SERPL CALC-SCNC: 15 MMOL/L
AST SERPL-CCNC: 20 U/L
BILIRUB SERPL-MCNC: 0.2 MG/DL
BUN SERPL-MCNC: 17 MG/DL
CALCIUM SERPL-MCNC: 10.8 MG/DL
CHLORIDE SERPL-SCNC: 104 MMOL/L
CO2 SERPL-SCNC: 20 MMOL/L
CREAT SERPL-MCNC: 0.89 MG/DL
EGFRCR SERPLBLD CKD-EPI 2021: 69 ML/MIN/1.73M2
HCT VFR BLD CALC: 39.5 %
HGB BLD-MCNC: 11.9 G/DL
MCHC RBC-ENTMCNC: 25.2 PG
MCHC RBC-ENTMCNC: 30.1 G/DL
MCV RBC AUTO: 83.7 FL
NT-PROBNP SERPL-MCNC: 326 PG/ML
PLATELET # BLD AUTO: 232 K/UL
POTASSIUM SERPL-SCNC: 4 MMOL/L
PROT SERPL-MCNC: 7.1 G/DL
RBC # BLD: 4.72 M/UL
RBC # FLD: 16.9 %
SODIUM SERPL-SCNC: 139 MMOL/L
WBC # FLD AUTO: 4.69 K/UL

## 2025-09-15 ENCOUNTER — NON-APPOINTMENT (OUTPATIENT)
Age: 70
End: 2025-09-15